# Patient Record
Sex: FEMALE | Race: WHITE | NOT HISPANIC OR LATINO | Employment: OTHER | URBAN - METROPOLITAN AREA
[De-identification: names, ages, dates, MRNs, and addresses within clinical notes are randomized per-mention and may not be internally consistent; named-entity substitution may affect disease eponyms.]

---

## 2017-01-03 ENCOUNTER — APPOINTMENT (OUTPATIENT)
Dept: PHYSICAL THERAPY | Facility: CLINIC | Age: 77
End: 2017-01-03
Payer: COMMERCIAL

## 2017-01-03 PROCEDURE — 97110 THERAPEUTIC EXERCISES: CPT

## 2017-01-03 PROCEDURE — 97140 MANUAL THERAPY 1/> REGIONS: CPT

## 2017-01-05 ENCOUNTER — APPOINTMENT (OUTPATIENT)
Dept: PHYSICAL THERAPY | Facility: CLINIC | Age: 77
End: 2017-01-05
Payer: COMMERCIAL

## 2017-01-05 PROCEDURE — 97110 THERAPEUTIC EXERCISES: CPT

## 2017-01-05 PROCEDURE — 97140 MANUAL THERAPY 1/> REGIONS: CPT

## 2017-01-09 ENCOUNTER — APPOINTMENT (OUTPATIENT)
Dept: PHYSICAL THERAPY | Facility: CLINIC | Age: 77
End: 2017-01-09
Payer: COMMERCIAL

## 2017-01-10 ENCOUNTER — APPOINTMENT (OUTPATIENT)
Dept: PHYSICAL THERAPY | Facility: CLINIC | Age: 77
End: 2017-01-10
Payer: COMMERCIAL

## 2017-01-12 ENCOUNTER — APPOINTMENT (OUTPATIENT)
Dept: PHYSICAL THERAPY | Facility: CLINIC | Age: 77
End: 2017-01-12
Payer: COMMERCIAL

## 2017-01-13 ENCOUNTER — ALLSCRIPTS OFFICE VISIT (OUTPATIENT)
Dept: OTHER | Facility: OTHER | Age: 77
End: 2017-01-13

## 2017-01-16 ENCOUNTER — APPOINTMENT (OUTPATIENT)
Dept: PHYSICAL THERAPY | Facility: CLINIC | Age: 77
End: 2017-01-16
Payer: COMMERCIAL

## 2017-01-16 PROCEDURE — 97140 MANUAL THERAPY 1/> REGIONS: CPT

## 2017-01-16 PROCEDURE — 97110 THERAPEUTIC EXERCISES: CPT

## 2017-01-17 ENCOUNTER — APPOINTMENT (OUTPATIENT)
Dept: PHYSICAL THERAPY | Facility: CLINIC | Age: 77
End: 2017-01-17
Payer: COMMERCIAL

## 2017-01-17 PROCEDURE — 97110 THERAPEUTIC EXERCISES: CPT

## 2017-01-17 PROCEDURE — 97014 ELECTRIC STIMULATION THERAPY: CPT

## 2017-01-17 PROCEDURE — G8991 OTHER PT/OT GOAL STATUS: HCPCS | Performed by: PHYSICAL THERAPIST

## 2017-01-17 PROCEDURE — G8990 OTHER PT/OT CURRENT STATUS: HCPCS | Performed by: PHYSICAL THERAPIST

## 2017-01-17 PROCEDURE — 97140 MANUAL THERAPY 1/> REGIONS: CPT

## 2017-01-19 ENCOUNTER — APPOINTMENT (OUTPATIENT)
Dept: PHYSICAL THERAPY | Facility: CLINIC | Age: 77
End: 2017-01-19
Payer: COMMERCIAL

## 2017-01-19 PROCEDURE — 97140 MANUAL THERAPY 1/> REGIONS: CPT

## 2017-01-19 PROCEDURE — 97014 ELECTRIC STIMULATION THERAPY: CPT

## 2017-01-19 PROCEDURE — 97110 THERAPEUTIC EXERCISES: CPT

## 2017-01-23 ENCOUNTER — APPOINTMENT (OUTPATIENT)
Dept: PHYSICAL THERAPY | Facility: CLINIC | Age: 77
End: 2017-01-23
Payer: COMMERCIAL

## 2017-01-23 PROCEDURE — 97014 ELECTRIC STIMULATION THERAPY: CPT

## 2017-01-23 PROCEDURE — G0283 ELEC STIM OTHER THAN WOUND: HCPCS

## 2017-01-23 PROCEDURE — 97140 MANUAL THERAPY 1/> REGIONS: CPT

## 2017-01-23 PROCEDURE — 97110 THERAPEUTIC EXERCISES: CPT

## 2017-01-24 ENCOUNTER — APPOINTMENT (OUTPATIENT)
Dept: PHYSICAL THERAPY | Facility: CLINIC | Age: 77
End: 2017-01-24
Payer: COMMERCIAL

## 2017-01-24 PROCEDURE — 97140 MANUAL THERAPY 1/> REGIONS: CPT

## 2017-01-24 PROCEDURE — 97014 ELECTRIC STIMULATION THERAPY: CPT

## 2017-01-24 PROCEDURE — G0283 ELEC STIM OTHER THAN WOUND: HCPCS

## 2017-01-24 PROCEDURE — 97110 THERAPEUTIC EXERCISES: CPT

## 2017-01-26 ENCOUNTER — GENERIC CONVERSION - ENCOUNTER (OUTPATIENT)
Dept: OTHER | Facility: OTHER | Age: 77
End: 2017-01-26

## 2017-01-26 ENCOUNTER — APPOINTMENT (OUTPATIENT)
Dept: PHYSICAL THERAPY | Facility: CLINIC | Age: 77
End: 2017-01-26
Payer: COMMERCIAL

## 2017-01-26 PROCEDURE — 97110 THERAPEUTIC EXERCISES: CPT

## 2017-01-26 PROCEDURE — 97140 MANUAL THERAPY 1/> REGIONS: CPT

## 2017-01-30 ENCOUNTER — APPOINTMENT (OUTPATIENT)
Dept: PHYSICAL THERAPY | Facility: CLINIC | Age: 77
End: 2017-01-30
Payer: COMMERCIAL

## 2017-01-30 PROCEDURE — G0283 ELEC STIM OTHER THAN WOUND: HCPCS

## 2017-01-30 PROCEDURE — 97140 MANUAL THERAPY 1/> REGIONS: CPT

## 2017-01-30 PROCEDURE — 97014 ELECTRIC STIMULATION THERAPY: CPT

## 2017-01-30 PROCEDURE — 97110 THERAPEUTIC EXERCISES: CPT

## 2017-01-31 ENCOUNTER — APPOINTMENT (OUTPATIENT)
Dept: PHYSICAL THERAPY | Facility: CLINIC | Age: 77
End: 2017-01-31
Payer: COMMERCIAL

## 2017-01-31 PROCEDURE — 97140 MANUAL THERAPY 1/> REGIONS: CPT

## 2017-01-31 PROCEDURE — 97110 THERAPEUTIC EXERCISES: CPT

## 2017-02-02 ENCOUNTER — APPOINTMENT (OUTPATIENT)
Dept: PHYSICAL THERAPY | Facility: CLINIC | Age: 77
End: 2017-02-02
Payer: COMMERCIAL

## 2017-02-03 ENCOUNTER — ALLSCRIPTS OFFICE VISIT (OUTPATIENT)
Dept: OTHER | Facility: OTHER | Age: 77
End: 2017-02-03

## 2017-02-03 ENCOUNTER — HOSPITAL ENCOUNTER (OUTPATIENT)
Dept: RADIOLOGY | Facility: CLINIC | Age: 77
Discharge: HOME | End: 2017-02-03
Payer: COMMERCIAL

## 2017-02-03 DIAGNOSIS — Z96.611 PRESENCE OF RIGHT ARTIFICIAL SHOULDER JOINT: ICD-10-CM

## 2017-02-03 PROCEDURE — 73030 X-RAY EXAM OF SHOULDER: CPT

## 2017-02-06 ENCOUNTER — APPOINTMENT (OUTPATIENT)
Dept: PHYSICAL THERAPY | Facility: CLINIC | Age: 77
End: 2017-02-06
Payer: COMMERCIAL

## 2017-02-06 PROCEDURE — 97140 MANUAL THERAPY 1/> REGIONS: CPT

## 2017-02-06 PROCEDURE — 97110 THERAPEUTIC EXERCISES: CPT

## 2017-02-07 ENCOUNTER — APPOINTMENT (OUTPATIENT)
Dept: PHYSICAL THERAPY | Facility: CLINIC | Age: 77
End: 2017-02-07
Payer: COMMERCIAL

## 2017-02-07 ENCOUNTER — GENERIC CONVERSION - ENCOUNTER (OUTPATIENT)
Dept: OTHER | Facility: OTHER | Age: 77
End: 2017-02-07

## 2017-02-09 ENCOUNTER — APPOINTMENT (OUTPATIENT)
Dept: PHYSICAL THERAPY | Facility: CLINIC | Age: 77
End: 2017-02-09
Payer: COMMERCIAL

## 2017-02-10 ENCOUNTER — APPOINTMENT (OUTPATIENT)
Dept: PHYSICAL THERAPY | Facility: CLINIC | Age: 77
End: 2017-02-10
Payer: COMMERCIAL

## 2017-02-10 PROCEDURE — 97110 THERAPEUTIC EXERCISES: CPT

## 2017-02-13 ENCOUNTER — APPOINTMENT (OUTPATIENT)
Dept: PHYSICAL THERAPY | Facility: CLINIC | Age: 77
End: 2017-02-13
Payer: COMMERCIAL

## 2017-02-13 PROCEDURE — 97110 THERAPEUTIC EXERCISES: CPT

## 2017-02-14 ENCOUNTER — APPOINTMENT (OUTPATIENT)
Dept: PHYSICAL THERAPY | Facility: CLINIC | Age: 77
End: 2017-02-14
Payer: COMMERCIAL

## 2017-02-14 PROCEDURE — G8990 OTHER PT/OT CURRENT STATUS: HCPCS | Performed by: PHYSICAL THERAPIST

## 2017-02-14 PROCEDURE — 97110 THERAPEUTIC EXERCISES: CPT

## 2017-02-14 PROCEDURE — G8991 OTHER PT/OT GOAL STATUS: HCPCS | Performed by: PHYSICAL THERAPIST

## 2017-02-16 ENCOUNTER — APPOINTMENT (OUTPATIENT)
Dept: PHYSICAL THERAPY | Facility: CLINIC | Age: 77
End: 2017-02-16
Payer: COMMERCIAL

## 2017-02-20 ENCOUNTER — APPOINTMENT (OUTPATIENT)
Dept: PHYSICAL THERAPY | Facility: CLINIC | Age: 77
End: 2017-02-20
Payer: COMMERCIAL

## 2017-02-20 PROCEDURE — 97110 THERAPEUTIC EXERCISES: CPT

## 2017-02-21 ENCOUNTER — APPOINTMENT (OUTPATIENT)
Dept: PHYSICAL THERAPY | Facility: CLINIC | Age: 77
End: 2017-02-21
Payer: COMMERCIAL

## 2017-02-21 PROCEDURE — 97110 THERAPEUTIC EXERCISES: CPT

## 2017-02-23 ENCOUNTER — APPOINTMENT (OUTPATIENT)
Dept: PHYSICAL THERAPY | Facility: CLINIC | Age: 77
End: 2017-02-23
Payer: COMMERCIAL

## 2017-02-23 PROCEDURE — 97110 THERAPEUTIC EXERCISES: CPT

## 2017-02-27 ENCOUNTER — APPOINTMENT (OUTPATIENT)
Dept: PHYSICAL THERAPY | Facility: CLINIC | Age: 77
End: 2017-02-27
Payer: COMMERCIAL

## 2017-02-27 PROCEDURE — 97110 THERAPEUTIC EXERCISES: CPT

## 2017-02-28 ENCOUNTER — APPOINTMENT (OUTPATIENT)
Dept: PHYSICAL THERAPY | Facility: CLINIC | Age: 77
End: 2017-02-28
Payer: COMMERCIAL

## 2017-02-28 PROCEDURE — 97110 THERAPEUTIC EXERCISES: CPT

## 2017-03-02 ENCOUNTER — APPOINTMENT (OUTPATIENT)
Dept: PHYSICAL THERAPY | Facility: CLINIC | Age: 77
End: 2017-03-02
Payer: COMMERCIAL

## 2017-03-02 PROCEDURE — 97110 THERAPEUTIC EXERCISES: CPT

## 2017-03-03 ENCOUNTER — HOSPITAL ENCOUNTER (OUTPATIENT)
Dept: RADIOLOGY | Facility: CLINIC | Age: 77
Discharge: HOME | End: 2017-03-03
Payer: COMMERCIAL

## 2017-03-03 ENCOUNTER — ALLSCRIPTS OFFICE VISIT (OUTPATIENT)
Dept: OTHER | Facility: OTHER | Age: 77
End: 2017-03-03

## 2017-03-03 DIAGNOSIS — Z96.611 PRESENCE OF RIGHT ARTIFICIAL SHOULDER JOINT: ICD-10-CM

## 2017-03-03 PROCEDURE — 73030 X-RAY EXAM OF SHOULDER: CPT

## 2017-03-06 ENCOUNTER — APPOINTMENT (OUTPATIENT)
Dept: PHYSICAL THERAPY | Facility: CLINIC | Age: 77
End: 2017-03-06
Payer: COMMERCIAL

## 2017-03-07 ENCOUNTER — APPOINTMENT (OUTPATIENT)
Dept: PHYSICAL THERAPY | Facility: CLINIC | Age: 77
End: 2017-03-07
Payer: COMMERCIAL

## 2017-03-07 PROCEDURE — 97110 THERAPEUTIC EXERCISES: CPT

## 2017-03-09 ENCOUNTER — APPOINTMENT (OUTPATIENT)
Dept: PHYSICAL THERAPY | Facility: CLINIC | Age: 77
End: 2017-03-09
Payer: COMMERCIAL

## 2017-03-09 PROCEDURE — 97110 THERAPEUTIC EXERCISES: CPT

## 2017-03-13 ENCOUNTER — APPOINTMENT (OUTPATIENT)
Dept: PHYSICAL THERAPY | Facility: CLINIC | Age: 77
End: 2017-03-13
Payer: COMMERCIAL

## 2017-03-13 PROCEDURE — 97014 ELECTRIC STIMULATION THERAPY: CPT

## 2017-03-13 PROCEDURE — G8991 OTHER PT/OT GOAL STATUS: HCPCS | Performed by: PHYSICAL THERAPIST

## 2017-03-13 PROCEDURE — G8990 OTHER PT/OT CURRENT STATUS: HCPCS | Performed by: PHYSICAL THERAPIST

## 2017-03-13 PROCEDURE — G0283 ELEC STIM OTHER THAN WOUND: HCPCS

## 2017-03-13 PROCEDURE — 97110 THERAPEUTIC EXERCISES: CPT

## 2017-03-14 ENCOUNTER — APPOINTMENT (OUTPATIENT)
Dept: PHYSICAL THERAPY | Facility: CLINIC | Age: 77
End: 2017-03-14
Payer: COMMERCIAL

## 2017-03-16 ENCOUNTER — APPOINTMENT (OUTPATIENT)
Dept: PHYSICAL THERAPY | Facility: CLINIC | Age: 77
End: 2017-03-16
Payer: COMMERCIAL

## 2017-03-17 ENCOUNTER — APPOINTMENT (OUTPATIENT)
Dept: PHYSICAL THERAPY | Facility: CLINIC | Age: 77
End: 2017-03-17
Payer: COMMERCIAL

## 2017-03-20 ENCOUNTER — APPOINTMENT (OUTPATIENT)
Dept: PHYSICAL THERAPY | Facility: CLINIC | Age: 77
End: 2017-03-20
Payer: COMMERCIAL

## 2017-03-20 PROCEDURE — 97110 THERAPEUTIC EXERCISES: CPT

## 2017-03-21 ENCOUNTER — APPOINTMENT (OUTPATIENT)
Dept: PHYSICAL THERAPY | Facility: CLINIC | Age: 77
End: 2017-03-21
Payer: COMMERCIAL

## 2017-03-21 PROCEDURE — 97110 THERAPEUTIC EXERCISES: CPT

## 2017-03-23 ENCOUNTER — APPOINTMENT (OUTPATIENT)
Dept: PHYSICAL THERAPY | Facility: CLINIC | Age: 77
End: 2017-03-23
Payer: COMMERCIAL

## 2017-03-23 PROCEDURE — G0283 ELEC STIM OTHER THAN WOUND: HCPCS

## 2017-03-23 PROCEDURE — 97110 THERAPEUTIC EXERCISES: CPT

## 2017-03-23 PROCEDURE — 97014 ELECTRIC STIMULATION THERAPY: CPT

## 2017-03-28 ENCOUNTER — APPOINTMENT (OUTPATIENT)
Dept: PHYSICAL THERAPY | Facility: CLINIC | Age: 77
End: 2017-03-28
Payer: COMMERCIAL

## 2017-03-30 ENCOUNTER — APPOINTMENT (OUTPATIENT)
Dept: PHYSICAL THERAPY | Facility: CLINIC | Age: 77
End: 2017-03-30
Payer: COMMERCIAL

## 2017-03-31 ENCOUNTER — APPOINTMENT (OUTPATIENT)
Dept: PHYSICAL THERAPY | Facility: CLINIC | Age: 77
End: 2017-03-31
Payer: COMMERCIAL

## 2017-07-07 ENCOUNTER — APPOINTMENT (OUTPATIENT)
Dept: RADIOLOGY | Facility: CLINIC | Age: 77
End: 2017-07-07
Payer: COMMERCIAL

## 2017-07-07 ENCOUNTER — ALLSCRIPTS OFFICE VISIT (OUTPATIENT)
Dept: OTHER | Facility: OTHER | Age: 77
End: 2017-07-07

## 2017-07-07 DIAGNOSIS — M25.519 PAIN IN SHOULDER: ICD-10-CM

## 2017-07-07 PROCEDURE — 73030 X-RAY EXAM OF SHOULDER: CPT

## 2017-07-20 ENCOUNTER — ALLSCRIPTS OFFICE VISIT (OUTPATIENT)
Dept: OTHER | Facility: OTHER | Age: 77
End: 2017-07-20

## 2017-11-02 ENCOUNTER — ALLSCRIPTS OFFICE VISIT (OUTPATIENT)
Dept: OTHER | Facility: OTHER | Age: 77
End: 2017-11-02

## 2017-11-02 ENCOUNTER — GENERIC CONVERSION - ENCOUNTER (OUTPATIENT)
Dept: OTHER | Facility: OTHER | Age: 77
End: 2017-11-02

## 2017-11-02 ENCOUNTER — LAB REQUISITION (OUTPATIENT)
Dept: LAB | Facility: HOSPITAL | Age: 77
End: 2017-11-02
Payer: COMMERCIAL

## 2017-11-02 DIAGNOSIS — F17.200 NICOTINE DEPENDENCE, UNCOMPLICATED: ICD-10-CM

## 2017-11-02 DIAGNOSIS — M19.90 OSTEOARTHRITIS: ICD-10-CM

## 2017-11-02 DIAGNOSIS — M85.80 OTHER SPECIFIED DISORDERS OF BONE DENSITY AND STRUCTURE, UNSPECIFIED SITE (CODE): ICD-10-CM

## 2017-11-02 DIAGNOSIS — I70.219 ATHEROSCLEROSIS OF NATIVE ARTERIES OF EXTREMITY WITH INTERMITTENT CLAUDICATION (HCC): ICD-10-CM

## 2017-11-02 DIAGNOSIS — E78.5 HYPERLIPIDEMIA: ICD-10-CM

## 2017-11-02 DIAGNOSIS — J44.9 CHRONIC OBSTRUCTIVE PULMONARY DISEASE (HCC): ICD-10-CM

## 2017-11-02 DIAGNOSIS — E55.9 VITAMIN D DEFICIENCY: ICD-10-CM

## 2017-11-02 DIAGNOSIS — Z00.00 ENCOUNTER FOR GENERAL ADULT MEDICAL EXAMINATION WITHOUT ABNORMAL FINDINGS: ICD-10-CM

## 2017-11-02 LAB
25(OH)D3 SERPL-MCNC: 29.1 NG/ML (ref 30–100)
ALBUMIN SERPL BCP-MCNC: 4.3 G/DL (ref 3.5–5)
ALP SERPL-CCNC: 106 U/L (ref 46–116)
ALT SERPL W P-5'-P-CCNC: 19 U/L (ref 12–78)
ANION GAP SERPL CALCULATED.3IONS-SCNC: 6 MMOL/L (ref 4–13)
AST SERPL W P-5'-P-CCNC: 14 U/L (ref 5–45)
BACTERIA UR QL AUTO: ABNORMAL /HPF
BASOPHILS # BLD AUTO: 0.02 THOUSANDS/ΜL (ref 0–0.1)
BASOPHILS NFR BLD AUTO: 0 % (ref 0–1)
BILIRUB SERPL-MCNC: 0.82 MG/DL (ref 0.2–1)
BILIRUB UR QL STRIP: NEGATIVE
BUN SERPL-MCNC: 14 MG/DL (ref 5–25)
CALCIUM SERPL-MCNC: 9.8 MG/DL (ref 8.3–10.1)
CHLORIDE SERPL-SCNC: 105 MMOL/L (ref 100–108)
CHOLEST SERPL-MCNC: 184 MG/DL (ref 50–200)
CLARITY UR: CLEAR
CO2 SERPL-SCNC: 27 MMOL/L (ref 21–32)
COLOR UR: YELLOW
CREAT SERPL-MCNC: 0.8 MG/DL (ref 0.6–1.3)
EOSINOPHIL # BLD AUTO: 0.17 THOUSAND/ΜL (ref 0–0.61)
EOSINOPHIL NFR BLD AUTO: 2 % (ref 0–6)
ERYTHROCYTE [DISTWIDTH] IN BLOOD BY AUTOMATED COUNT: 13.4 % (ref 11.6–15.1)
GFR SERPL CREATININE-BSD FRML MDRD: 71 ML/MIN/1.73SQ M
GLUCOSE P FAST SERPL-MCNC: 90 MG/DL (ref 65–99)
GLUCOSE UR STRIP-MCNC: NEGATIVE MG/DL
HCT VFR BLD AUTO: 42.6 % (ref 34.8–46.1)
HDLC SERPL-MCNC: 71 MG/DL (ref 40–60)
HGB BLD-MCNC: 14.8 G/DL (ref 11.5–15.4)
HGB UR QL STRIP.AUTO: NEGATIVE
HYALINE CASTS #/AREA URNS LPF: ABNORMAL /LPF
KETONES UR STRIP-MCNC: NEGATIVE MG/DL
LDLC SERPL CALC-MCNC: 72 MG/DL (ref 0–100)
LEUKOCYTE ESTERASE UR QL STRIP: ABNORMAL
LYMPHOCYTES # BLD AUTO: 3.35 THOUSANDS/ΜL (ref 0.6–4.47)
LYMPHOCYTES NFR BLD AUTO: 43 % (ref 14–44)
MCH RBC QN AUTO: 35.4 PG (ref 26.8–34.3)
MCHC RBC AUTO-ENTMCNC: 34.7 G/DL (ref 31.4–37.4)
MCV RBC AUTO: 102 FL (ref 82–98)
MONOCYTES # BLD AUTO: 0.71 THOUSAND/ΜL (ref 0.17–1.22)
MONOCYTES NFR BLD AUTO: 9 % (ref 4–12)
NEUTROPHILS # BLD AUTO: 3.55 THOUSANDS/ΜL (ref 1.85–7.62)
NEUTS SEG NFR BLD AUTO: 46 % (ref 43–75)
NITRITE UR QL STRIP: NEGATIVE
NON-SQ EPI CELLS URNS QL MICRO: ABNORMAL /HPF
NRBC BLD AUTO-RTO: 0 /100 WBCS
PH UR STRIP.AUTO: 7 [PH] (ref 4.5–8)
PLATELET # BLD AUTO: 252 THOUSANDS/UL (ref 149–390)
PMV BLD AUTO: 10.4 FL (ref 8.9–12.7)
POTASSIUM SERPL-SCNC: 4.6 MMOL/L (ref 3.5–5.3)
PROT SERPL-MCNC: 7.6 G/DL (ref 6.4–8.2)
PROT UR STRIP-MCNC: ABNORMAL MG/DL
RBC # BLD AUTO: 4.18 MILLION/UL (ref 3.81–5.12)
RBC #/AREA URNS AUTO: ABNORMAL /HPF
SODIUM SERPL-SCNC: 138 MMOL/L (ref 136–145)
SP GR UR STRIP.AUTO: 1.02 (ref 1–1.03)
TRIGL SERPL-MCNC: 205 MG/DL
TSH SERPL DL<=0.05 MIU/L-ACNC: 1.11 UIU/ML (ref 0.36–3.74)
UROBILINOGEN UR QL STRIP.AUTO: 0.2 E.U./DL
WBC # BLD AUTO: 7.82 THOUSAND/UL (ref 4.31–10.16)
WBC #/AREA URNS AUTO: ABNORMAL /HPF

## 2017-11-02 PROCEDURE — 84443 ASSAY THYROID STIM HORMONE: CPT | Performed by: FAMILY MEDICINE

## 2017-11-02 PROCEDURE — 80053 COMPREHEN METABOLIC PANEL: CPT | Performed by: FAMILY MEDICINE

## 2017-11-02 PROCEDURE — 81001 URINALYSIS AUTO W/SCOPE: CPT | Performed by: FAMILY MEDICINE

## 2017-11-02 PROCEDURE — 82306 VITAMIN D 25 HYDROXY: CPT | Performed by: FAMILY MEDICINE

## 2017-11-02 PROCEDURE — 80061 LIPID PANEL: CPT | Performed by: FAMILY MEDICINE

## 2017-11-02 PROCEDURE — 85025 COMPLETE CBC W/AUTO DIFF WBC: CPT | Performed by: FAMILY MEDICINE

## 2017-11-03 NOTE — PROGRESS NOTES
Assessment  1  Chronic obstructive pulmonary disease (496) (J44 9)   2  Osteoarthritis (715 90) (M19 90)   3  Hyperlipidemia (272 4) (E78 5)   4  Vitamin D deficiency (268 9) (E55 9)   5  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)   (I70 219)   6  Current every day smoker (305 1) (F17 200)   7  DDD (degenerative disc disease) (722 6)   8  Osteopenia (733 90) (M85 80)    Plan  Atherosclerosis of native artery of extremity with intermittent claudication, Chronic  obstructive pulmonary disease, SocHx: Current every day smoker, DDD (degenerative  disc disease), Health Maintenance, Hyperlipidemia, Osteoarthritis, Osteopenia, Vitamin  D deficiency    · (1) CBC/PLT/DIFF; Status:Hold For - Exact Date; Requested for: In Office Collection;    · (1) COMPREHENSIVE METABOLIC PANEL; Status:Hold For - Exact Date; Requested  for: In Office Collection;    · (1) LIPID PANEL, FASTING; Status:Hold For - Exact Date; Requested for: In Office  Collection;    · (1) TSH WITH FT4 REFLEX; Status:Hold For - Exact Date; Requested for: In Office  Collection;    · (1) URINALYSIS w URINE C/S REFLEX (will reflex a microscopy if leukocytes, occult  blood, or nitrites are not within normal limits); Status:Hold For - Exact Date; Requested  for: In Office Collection;    · (1) VITAMIN D 25-HYDROXY; Status:Hold For - Exact Date; Requested for: In [employer]; Health Maintenance    · *VB - Fall Risk Assessment  (Dx Z13 89 Screen for Neurologic Disorder); Status:Active; Requested HIO:10TXS3085;    · *VB - Urinary Incontinence Screen (Dx Z13 89 Screen for UI); Status:Active; Requested  for:06Nvu2172;    · Fluzone High-Dose 0 5 ML Intramuscular Suspension Prefilled Syringe;  INJECT 0 5  ML Intramuscular;  To Be Done: 68BYC3837  Hyperlipidemia    · Simvastatin 40 MG Oral Tablet; TAKE ONE TABLET BY MOUTH EVERY DAY   · A diet that is low in fat, cholesterol, and sodium is considered a cardiac diet ;  Status:Complete;   Done: 71DCD3154   · Begin a limited exercise program ; Status:Complete;   Done: 76UDF6927   · Begin or continue regular aerobic exercise  Gradually work up to at least 3 sessions of  30 minutes of exercise a week ; Status:Complete;   Done: 13XJR5318   · Continue with our present treatment plan ; Status:Complete;   Done: 71YMU4074   · Eat a low fat and low cholesterol diet ; Status:Complete;   Done: 06CYN2668   · Eat no more than 30 grams of fat per day ; Status:Complete;   Done: 52WXR2556   · We recommend you modify your diet to achieve and maintain a healthy weight  Being  overweight may increase your risk for developing health problems such as diabetes,  heart disease, and cancer  Avoid high fat foods and eat a balanced diet rich  in fruits and vegetables  The combination of a reduced-calorie diet and increased  physical activity is recommended  Please let us know if you would like to  learn more about your nutrition and calorie needs, and additional options including  weight loss programs that can help you achieve your goals ; Status:Complete;   Done:  86DKR9195  SocHx: Current every day smoker    · Decreasing the stress in your life may help your condition improve ; Status:Complete;    Done: 86KPG8455   · You need to quit smoking ; Status:Complete;   Done: 80EYR5373    Discussion/Summary    Patient to continue present treatment and agrees to restart simvastatin  Discussed medical treatment for elevated blood pressure if it continues  Patient instructed to follow a low-fat and a low-salt diet and get regular exercise walking as tolerated  Discussed use of inhalers, the patient declines at this time  Patient received high-dose flu vaccine today  Fasting labs drawn as above  Recommend patient discontinue smoking  Patient to return the office in 4 months  Possible side effects of new medications were reviewed with the patient/guardian today   The treatment plan was reviewed with the patient/guardian  The patient/guardian understands and agrees with the treatment plan      Chief Complaint  Karen Castano not taken her simvastatin since March   Patient is here today for follow up of chronic conditions described in HPI  History of Present Illness  Patient is here for routine appointment for chronic conditions and fasting labs  Patient has been feeling fairly well overall other complains of continued right shoulder pain status post humerus fracture and surgery last November  Patient has returned to work 2 days a week  No regular exercise program although patient remains fairly active throughout the day  Patient discontinued simvastatin several months ago secondary to the cost  Patient continues to smoke 4 cigarettes per day  The patient states she has been stable with her COPD control since the last visit  She has no significant interval events  Symptoms: denies dyspnea on exertion,-- denies exercise intolerance,-- stable coughing,-- stable coughing up sputum,-- stable wheezing-- and-- denies lower extremity edema  Associated Symptoms: no fever  rescue inhaler use  Medications: The patient is not currently on any medications for her COPD  Disease Management: the patient is doing well with her COPD goals  The patient states her hyperlipidemia has been under good control since the last visit  Comorbid Illnesses: peripheral vascular disease-- and-- tobacco use  She has no significant interval events  Symptoms: denies chest pain,-- stable intermittent leg claudication,-- denies muscle pain-- and-- denies muscle weakness  Associated symptoms include no memory loss  Medications: The patient is not currently on any medications for her hyperlipidemia  -- the patient is not adherent with her medication regimen  -- She denies medication side effects  The patient is doing well with her hyperlipidemia goals  the patient's last LDL was 71 mg/dL   The patient is due for a lipid panel-- and-- liver function tests  The patient is being seen for follow-up of vitamin D deficiency  Disease type: vitamin D deficiency  Current treatment includes dietary vitamin D, dietary calcium, weight bearing exercise and vitamin D3 (cholecalciferol)  Symptoms:  no fatigue,-- no bone pain,-- no muscle pain,-- no muscle weakness,-- no muscle cramps,-- no paresthesias-- and-- no gait abnormality  The patient is currently experiencing symptoms  The patient states her osteoarthritis has been stable since the last visit  The patient's osteoarthritis has not resulted in physical disability  She has no significant interval events  Symptoms: denies knee pain,-- denies finger pain,-- denies neck pain-- and-- stable back pain--    The patient presents with complaints of right hip stable hip pain  The patient presents with complaints of right shoulder stable shoulder pain  Activities: able to do housework with limitations-- and-- able to work with limitations, but-- able to do activities of daily living without limitations  Medications: The patient is not currently on any medications for her osteoarthritis  Disease Management: the patient is doing well with her osteoarthritis goals  Review of Systems    Constitutional: no fever,-- not feeling poorly,-- no chills-- and-- not feeling tired  Eyes: No complaints of eye pain, no red eyes, no eyesight problems, no discharge, no dry eyes, no itching of eyes  ENT: no complaints of earache, no loss of hearing, no nose bleeds, no nasal discharge, no sore throat, no hoarseness  Gastrointestinal: No complaints of abdominal pain, no constipation, no nausea or vomiting, no diarrhea, no bloody stools  Genitourinary: no dysuria-- and-- no incontinence  Hematologic/Lymphatic: No complaints of swollen glands, no swollen glands in the neck, does not bleed easily, does not bruise easily       Preventive Quality 65 and Older: Falls Risk: The patient fell 1 times in the past 12 months  --    tripped  Symptoms Include: no confusion, no lightheadedness, no vertigo, no dizziness, no syncope, no impaired balance, no visual problems, no leg weakness, no recurring falls and no recent fall  The patient is currently experiencing fall symptoms  Associated symptoms:  no impaired mobility-- and-- no impaired ability to live independently  The patient is currently experiencing urinary symptoms  Urinary Incontinence Symptoms includes: no urinary incontinence, no incomplete bladder emptying, no urinary frequency, no urinary urgency, no urinary hesitancy, no dysuria, no nocturia, no straining, no weak stream, no intermittent stream and no post-void dribbling       Active Problems  1  Asymptomatic menopausal state (V49 81) (Z78 0)   2  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)   (I70 219)   3  Chronic obstructive pulmonary disease (496) (J44 9)   4  Colon cancer screening (V76 51) (Z12 11)   5  Current every day smoker (305 1) (F17 200)   6  DDD (degenerative disc disease) (722 6)   7  Dermatitis (692 9) (L30 9)   8  Edema (782 3) (R60 9)   9  Humerus fracture (812 20) (S42 309A)   10  Hyperlipidemia (272 4) (E78 5)   11  Iliac artery stenosis, bilateral (447 1) (I77 1)   12  Occlusion and stenosis of carotid artery (433 10) (I65 29)   13  Open wound of groin (879 4) (S31 109A)   14  Osteoarthritis (715 90) (M19 90)   15  Osteopenia (733 90) (M85 80)   16  Postoperative seroma, subsequent encounter   17  Postoperative state (V45 89) (Z98 890)   18  Presence of artificial shoulder joint, right (V43 61) (Z96 611)   19  Right shoulder pain (719 41) (M25 511)   20  Shoulder pain (719 41) (M25 519)   21  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)   22  Vitamin D deficiency (268 9) (E55 9)    Past Medical History  1  History of Acute bronchitis (466 0) (J20 9)   2  History of appendicitis (V12 79) (Z87 19)    Surgical History  1   History of Appendectomy   2  History of Endarterectomy   3  History of Hemorrhoidectomy   4  History of Incisional Breast Biopsy   5  History of PTA Femoral-Popliteal Right   6  History of PTA Iliac Initial Stenosis With Stent   7  History of Tonsillectomy   8  History of Wound Care Debridement Non-Selective    Family History  Mother    1  Family history of Acute Myocardial Infarction (V17 3)  Father    2  Family history of Liver Cancer  Sister    3  Family history of Kidney Cancer (V16 51)   4  Family history of Kidney Cancer (V16 51)   5  Family history of Lung Cancer (V16 1)   6  Family history of Lung Cancer (V16 1)    Social History   · Alcohol Use (History)   · Cigarette smoker (305 1) (F17 210)   · Current every day smoker (305 1) (F17 200)   · Current some day smoker (305 1) (F17 200)   · Currently attempting to quit smoking (305 1) (Z72 0)   · Daily Coffee Consumption (2  Cups/Day)    Current Meds   1  Aleve CAPS Recorded   2  Aspirin 81 MG TABS; Therapy: (Carmen Munroe) to Recorded   3  Calcium 600 + D TABS; Therapy: (Recorded:99Gdi4536) to Recorded   4  Lidocaine HCl - 4 % External Solution; APPLY 2  [facility] External; To Be Done: 50VDT3031;   Status: HOLD FOR - Administration Ordered   5  Simvastatin 40 MG Oral Tablet; TAKE ONE TABLET BY MOUTH EVERY DAY; Therapy: 39GYV1351 to (Rosa Almodovar)  Requested for: 74YXE8044; Last   SR:10UIG1527 Ordered    Allergies  1  CeleBREX CAPS  2  Adhesive Tape   3  Chocolate   4  Other    Vitals  Vital Signs    Recorded: 37QFY7389 08:29AM Recorded: 67RMZ4693 08:00AM   Temperature  98 7 F   Heart Rate 72    Respiration 16    Systolic 294    Diastolic 82    Height  5 ft 1 in   Weight  146 lb    BMI Calculated  27 59   BSA Calculated  1 65     Physical Exam    Constitutional   General appearance: No acute distress, well appearing and well nourished  Eyes   Conjunctiva and lids: No swelling, erythema or discharge      Ears, Nose, Mouth, and Throat   External inspection of ears and nose: Normal     Otoscopic examination: Tympanic membranes translucent with normal light reflex  Canals patent without erythema  Nasal mucosa, septum, and turbinates: Normal without edema or erythema  Oropharynx: Normal with no erythema, edema, exudate or lesions  Pulmonary   Respiratory effort: No increased work of breathing or signs of respiratory distress  Auscultation of lungs: Abnormal   Auscultation of the lungs revealed decreased breath sounds diffusely-- and-- expiratory wheezing  Cardiovascular   Auscultation of heart: Normal rate and rhythm, normal S1 and S2, without murmurs  Examination of extremities for edema and/or varicosities: Normal     Carotid pulses: Normal     Abdomen   Abdomen: Non-tender, no masses  Lymphatic   Palpation of lymph nodes in neck: No lymphadenopathy  Musculoskeletal   Gait and station: Normal     Inspection/palpation of joints, bones, and muscles: Abnormal     Skin   Skin and subcutaneous tissue: Normal without rashes or lesions  Psychiatric   Orientation to person, place, and time: Normal     Mood and affect: Normal          Health Management  Colon cancer screening   COLONOSCOPY; every 10 years; Next Due: S738579; Overdue  Hyperlipidemia   (1) HEPATIC FUNCTION PANEL; every 6 months; Last 01MXT0120; Next Due: 14Uwk3468; Overdue  (1) LIPID PANEL, FASTING; every 1 year; Last 22VQW7003; Next Due: 09Phc6802; Overdue  Health Maintenance   Medicare Annual Wellness Visit; every 1 year; Next Due: 87PBN3074; Overdue    Future Appointments    Date/Time Provider Specialty Site   12/08/2017 08:30 AM BERNARDA Brar   Orthopedic Surgery [facility]     Signatures   Electronically signed by : Josh Castillo DO; Nov 2 2017  8:41AM EST                       (Author)

## 2017-12-08 ENCOUNTER — ALLSCRIPTS OFFICE VISIT (OUTPATIENT)
Dept: OTHER | Facility: OTHER | Age: 77
End: 2017-12-08

## 2017-12-09 NOTE — PROGRESS NOTES
Assessment    1  Humerus fracture (812 20) (S44 309A)    Plan  Right shoulder pain    · Continue with our present treatment plan ; Status:Complete;   Done: 45SDS8359  Shoulder pain    · Follow-up visit in 6 months Evaluation and Treatment  Follow-up  Status: Hold For -Scheduling  Requested for: 84UYB4771    Discussion/Summary    69 y/o female presents with now 1 year s/p R reverse total shoulder arthroplasty for fracture  activity as toleratedas needed-follow up in 6 months with new shoulder Xray, trauma series  Chief Complaint    1  Shoulder Problem    Post-Op  HPI: 69 y/o female with s/p R reverse total shoulder arthroplasty for fracture (DOS: 12/8/2016)  She states there is mild improvement in pain since her last visit  She has been doing exercises by herself at home  She has been lifting 25-30lbs  Denies any new problems except some pain to the deltoid area  She takes aleve at night for pain  She states her right middle finger pain is gone with her home exercise regimen  patient's medical history, surgical history, social history, family history, medications, allergies, and review of systems were reviewed and updated today  of Systems:No fever or chills, feels well, no tiredness, no recent weight gain or loss  No complaints of eyesight problems, no red eyesNo loss of hearing, no nosebleeds, no sore throatNo chest pain, palpitations, leg claudication, or lower extremity edema  No shortness of breath, wheezing, or coughNo abdominal pain, constipation, nausea/vomiting, no diarrhea  No dysuria or incontinence  As noted in HPI  No rash or skin lesions, no itching or dry skin, no wounds  No headache, confusion, numbness or tingling, or dizziness  No muscle weakness, frequent urination, or excessive thirst No suicidal thoughts, anziety, or depression  Review of Systems   Constitutional: No fever, no chills, feels well, no tiredness, no recent weight gain or loss    Eyes: No complaints of eyesight problems, no red eyes   ENT: no loss of hearing, no nosebleeds, no sore throat  Cardiovascular: No complaints of chest pain, no palpitations, no leg claudication or lower extremity edema  Respiratory: no compliants of shortness of breath, no wheezing, no cough  Gastrointestinal: no complaints of abdominal pain, no constipation, no nausea or diarrhea, no vomiting, no bloody stools  Genitourinary: no complaints of dysuria, no incontinence  Musculoskeletal: as noted in HPI  Integumentary: no complaints of skin rash or lesion, no itching or dry skin, no skin wounds  Neurological: no complaints of headache, no confusion, no numbness or tingling, no dizziness  Endocrine: No complaints of muscle weakness, no feelings of weakness, no frequent urination, no excessive thirst   Psychiatric: No suicidal thoughts, no anxiety, no feelings of depression  ROS reviewed  Active Problems    1  Asymptomatic menopausal state (V49 81) (Z78 0)  2  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) (I70 219)  3  Chronic obstructive pulmonary disease (496) (J44 9)  4  Colon cancer screening (V76 51) (Z12 11)  5  Current every day smoker (305 1) (F17 200)  6  DDD (degenerative disc disease) (722 6)  7  Dermatitis (692 9) (L30 9)  8  Edema (782 3) (R60 9)  9  Humerus fracture (812 20) (S42 309A)  10  Hyperlipidemia (272 4) (E78 5)  11  Iliac artery stenosis, bilateral (447 1) (I77 1)  12  Occlusion and stenosis of carotid artery (433 10) (I65 29)  13  Open wound of groin (879 4) (S31 109A)  14  Osteoarthritis (715 90) (M19 90)  15  Osteopenia (733 90) (M85 80)  16  Postoperative seroma, subsequent encounter  17  Postoperative state (V45 89) (Z98 890)  18  Presence of artificial shoulder joint, right (V43 61) (Z96 611)  19  Right shoulder pain (719 41) (M25 511)  20  Shoulder pain (719 41) (M25 519)  21  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)  22   Vitamin D deficiency (268 9) (E55 9)    Social History     · Alcohol Use (History)   · Cigarette smoker (305 1) (F17 210)   · 1/2-1 PPD X50 PLUS YRS   · Current every day smoker (305 1) (F17 200)   · Current some day smoker (305 1) (F17 200)   · Currently attempting to quit smoking (305 1) (Z72 0)   · Daily Coffee Consumption (2  Cups/Day)  The social history was reviewed and updated today  The social history was reviewed and is unchanged  Current Meds  1  Aleve CAPS Recorded  2  Aspirin 81 MG TABS; Therapy: (Aloma Feathers) to Recorded  3  Calcium 600 + D TABS; Therapy: (Recorded:20Nov2014) to Recorded  4  Lidocaine HCl - 4 % External Solution; APPLY 2  [facility] External; To Be Done: 93CMF3929; Status: HOLD FOR - Administration Ordered  5  Simvastatin 40 MG Oral Tablet; TAKE ONE TABLET BY MOUTH EVERY DAY; Therapy: 91NOC4336 to (320 [id number])  Requested for: 37TDM5708; Last Rx:02Nov2017 Ordered    The medication list was reviewed and updated today  Allergies  1  CeleBREX CAPS  2  Adhesive Tape  3  Chocolate  4  Other    Vitals   Recorded: 79FCW1781 08:50AM   Heart Rate 67   Systolic 695   Diastolic 77   Weight 149 lb        Physical Exam    Right Shoulder: Appearance: Normal except  (Elevation 170  Internal rotation L4  External rotation 50    Cuff strength is 4+/5 elevation, 4-/5 external rotation, 4-/5 belly press)   Constitutional - General appearance: Normal   Musculoskeletal - Gait and station: Normal -- Digits and nails: Normal -- Muscle strength/tone: Normal   Cardiovascular - Pulses: Normal -- Examination of extremities for edema and/or varicosities: Normal   Skin - Skin and subcutaneous tissue: Normal   Neurologic - Sensation: Normal   Psychiatric - Orientation to person, place, and time: Normal -- Mood and affect: Normal   Eyes  Conjunctiva and lids: Normal    Pupils and irises: Normal        Attending Note  Attending Note [facility]: Attending Note:1  I interviewed, took the history and examined the patient1 ,-- I discussed the case with the Resident and reviewed the Resident's note1 -- and-- I agree with the Resident management plan as it was presented to [facility]   I agree with the Resident's note1        1 Amended By: Makeda Plasencia; Dec 08 2017 4:09 PM EST    Future Appointments    Date/Time Provider Specialty Site   02/02/2018 10:[place] [address]   06/08/2018 09:30 AM BERNARDA Fu   Orthopedic Surgery [facility]       Signatures   Electronically signed by : Declan Galvan DPM; Dec  8 2017  9:27AM EST                       (Author)    Electronically signed by : BERNARDA Silva ; Dec  8 2017  4:09PM EST                       (Author)

## 2018-01-09 NOTE — MISCELLANEOUS
Message  Return to work or school:   Denis Pickering is under my professional care  She was seen in my office on 2/3/17     She is able to work with limitations (no lifting greater than one lbs with the right arm  no use of the arm above shoulder height begining 2/19/17)  Phan Garcia PA-C        Signatures   Electronically signed by : Geni Guzman, [facility]; Feb 7 2017  9:02AM EST                       (Author)    Electronically signed by : BERNARDA Guardado ; Feb 7 2017  3:43PM EST                       (Author)

## 2018-01-12 VITALS — SYSTOLIC BLOOD PRESSURE: 146 MMHG | WEIGHT: 147 LBS | DIASTOLIC BLOOD PRESSURE: 64 MMHG | BODY MASS INDEX: 27.78 KG/M2

## 2018-01-12 NOTE — PROGRESS NOTES
Assessment    1  Chronic obstructive pulmonary disease (496) (J44 9)   2  Hyperlipidemia (272 4) (E78 5)   3  Osteoarthritis (715 90) (M19 90)   4  Vitamin D deficiency (268 9) (E55 9)   5  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)   (I70 219)   6  DDD (degenerative disc disease) (722 6)   7  Current every day smoker (305 1) (F17 200)   8  Osteopenia (733 90) (M85 80)    Plan  Health Maintenance    · Fluzone High-Dose 0 5 ML Intramuscular Suspension Prefilled Syringe;  INJECT 0 5  ML Intramuscular; To Be Done: 93GYZ1898  Hyperlipidemia    · A diet that is low in fat, cholesterol, and sodium is considered a cardiac diet ;  Status:Complete;   Done: 63QSP8758   · Begin a limited exercise program ; Status:Complete;   Done: 64OAZ7062   · Begin or continue regular aerobic exercise  Gradually work up to at least 3 sessions of  30 minutes of exercise a week ; Status:Complete;   Done: 72QEL1630   · Continue with our present treatment plan ; Status:Complete;   Done: 15SLS7079   · Eat a low fat and low cholesterol diet ; Status:Complete;   Done: 63HVS4402   · Eat no more than 30 grams of fat per day ; Status:Complete;   Done: 65JVA9589   · We recommend you modify your diet to achieve and maintain a healthy weight  Being  overweight may increase your risk for developing health problems such as diabetes,  heart disease, and cancer  Avoid high fat foods and eat a balanced diet rich  in fruits and vegetables  The combination of a reduced-calorie diet and increased  physical activity is recommended    Please let us know if you would like to  learn more about your nutrition and calorie needs, and additional options including  weight loss programs that can help you achieve your goals ; Status:Complete;   Done:  43PYW1112  SocHx: Current every day smoker    · Decreasing the stress in your life may help your condition improve ; Status:Complete;    Done: 86GCQ0219   · You need to quit smoking ; Status:Complete;   Done: 00CDN7267    Discussion/Summary    Patient received high-dose flu vaccine today  Patient to continue present treatment  Patient instructed to follow low-fat low-salt diet and to get regular exercise walking as tolerated  Recommend patient discontinue smoking completely  Patient to return to the office in 3-4 months for an appointment and fasting labs  Possible side effects of new medications were reviewed with the patient/guardian today  The treatment plan was reviewed with the patient/guardian  The patient/guardian understands and agrees with the treatment plan      Chief Complaint  Non Fasting  [facility]   Patient is here today for follow up of chronic conditions described in HPI  History of Present Illness  Patient is here for routine appointment for chronic conditions and she is not fasting today  Patient saw Dr Sorto, vascular surgeon yesterday and was started on Pletal  Patient has been feeling well overall  He continues to work full-time and remains physically active at work as well as housework and yardwork  Patient continues to smoke  The patient states she has been stable with her COPD control since the last visit  Symptoms: stable dyspnea on exertion, stable exercise intolerance, stable coughing, stable coughing up sputum, stable wheezing and denies lower extremity edema  Associated Symptoms: no fever  Medications: The patient is not currently on any medications for her COPD  Disease Management: the patient is doing well with her COPD goals  The patient states her hyperlipidemia has been under good control since the last visit  Comorbid Illnesses: peripheral vascular disease and tobacco use  She has no significant interval events  Symptoms: denies chest pain, stable intermittent leg claudication, denies muscle pain and denies muscle weakness  Associated symptoms include no memory loss     Medications: the patient is adherent with her medication regimen  She denies medication side effects  The patient is doing well with her hyperlipidemia goals  the patient's last LDL was 71 mg/dL  The patient is being seen for follow-up of vitamin D deficiency  Disease type: vitamin D deficiency  Current treatment includes dietary vitamin D, dietary calcium and vitamin D3 (cholecalciferol)  Symptoms:  no fatigue, no bone pain, no muscle pain, no muscle weakness, no muscle cramps, no muscle twitching, no paresthesias and no gait abnormality  The patient is currently experiencing symptoms  The patient states her osteoarthritis has been stable since the last visit  The patient's osteoarthritis has not resulted in physical disability  She has no significant interval events  Symptoms: denies knee pain, denies finger pain, denies shoulder pain, denies neck pain and stable back pain    The patient presents with complaints of right hip stable hip pain  Associated symptoms include no localized joint swelling and no localized joint stiffness  Activities: able to do activities of daily living without limitations, able to do housework without limitations and able to work without limitations  Medications: The patient is not currently on any medications for her osteoarthritis  Disease Management: the patient is doing well with her osteoarthritis goals  Review of Systems    Constitutional: no fever, not feeling poorly, no chills and not feeling tired  Eyes: No complaints of eye pain, no red eyes, no eyesight problems, no discharge, no dry eyes, no itching of eyes  ENT: no complaints of earache, no loss of hearing, no nose bleeds, no nasal discharge, no sore throat, no hoarseness  Gastrointestinal: No complaints of abdominal pain, no constipation, no nausea or vomiting, no diarrhea, no bloody stools  Genitourinary: no dysuria and no incontinence     Hematologic/Lymphatic: No complaints of swollen glands, no swollen glands in the neck, does not bleed easily, does not bruise easily  Active Problems    1  Asymptomatic menopausal state (V49 81) (Z78 0)   2  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)   (I70 219)   3  Chronic obstructive pulmonary disease (496) (J44 9)   4  Colon cancer screening (V76 51) (Z12 11)   5  Current every day smoker (305 1) (F17 200)   6  DDD (degenerative disc disease) (722 6)   7  Dermatitis (692 9) (L30 9)   8  Edema (782 3) (R60 9)   9  Hyperlipidemia (272 4) (E78 5)   10  Iliac artery stenosis, bilateral (447 1) (I77 1)   11  Non-healing surgical wound (998 83) (T81 89XA)   12  Occlusion and stenosis of carotid artery (433 10) (I65 29)   13  Open wound of groin (879 4) (S31 109A)   14  Osteoarthritis (715 90) (M19 90)   15  Osteopenia (733 90) (M85 80)   16  Postoperative seroma, subsequent encounter   17  Postoperative state (V45 89) (Z98 89)   18  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)   19  Vitamin D deficiency (268 9) (E55 9)    Past Medical History    1  History of Acute bronchitis (466 0) (J20 9)   2  History of appendicitis (V12 79) (Z87 19)    Surgical History    1  History of Appendectomy   2  History of Endarterectomy   3  History of Hemorrhoidectomy   4  History of Incisional Breast Biopsy   5  History of PTA Femoral-Popliteal Right   6  History of PTA Iliac Initial Stenosis With Stent   7  History of Tonsillectomy   8  History of Wound Care Debridement Non-Selective    Family History  Mother    1  Family history of Acute Myocardial Infarction (V17 3)  Father    2  Family history of Liver Cancer  Sister    3  Family history of Kidney Cancer (V16 51)   4  Family history of Kidney Cancer (V16 51)   5  Family history of Lung Cancer (V16 1)   6   Family history of Lung Cancer (V16 1)    Social History    · Alcohol Use (History)   · Cigarette smoker (305 1) (F17 210)   · Current every day smoker (305 1) (F17 200)   · Current some day smoker (305 1) (F17 210)   · Currently attempting to quit smoking (305 1) (Z72 0)   · Daily Coffee Consumption (2  Cups/Day)    Current Meds   1  Aspirin 81 MG TABS; Therapy: (Ninfa Jasso) to Recorded   2  Calcium 600 + D TABS; Therapy: (Recorded:20Nov2014) to Recorded   3  Cilostazol 100 MG Oral Tablet; TAKE 1 TABLET TWICE DAILY; Therapy: 02UAE9854 to (Last Rx:59Nlb1115)  Requested for: 68Xlj2700 Ordered   4  [employer] Vitamin D CAPS; Therapy: (Rose Becker) to Recorded   5  Lidocaine HCl - 4 % External Solution; APPLY 2  [facility] External; To Be Done: 12YLD3572;   Status: HOLD FOR - Administration Ordered   6  Simvastatin 40 MG Oral Tablet; TAKE ONE TABLET BY MOUTH EVERY DAY; Therapy: 78MCU7827 to (Alexa Martin)  Requested for: 50IIT3552; Last   TR:57GXW6599 Ordered   7  Vitamin C TABS; Therapy: (Recorded:18Alh2426) to Recorded    Allergies    1  CeleBREX CAPS    2  Adhesive Tape   3  Chocolate   4  Other    Vitals  Vital Signs    Recorded: 47IHS4161 01:24PM Recorded: 85QHO0371 89:38OJ   Systolic 528    Diastolic 74    Heart Rate 80    Respiration 16    Temperature  98 1 F   Height  5 ft 1 in   Weight  156 lb    BMI Calculated  29 48   BSA Calculated  1 7     Physical Exam    Constitutional   General appearance: No acute distress, well appearing and well nourished  Eyes   Conjunctiva and lids: No swelling, erythema or discharge  Ears, Nose, Mouth, and Throat   External inspection of ears and nose: Normal     Otoscopic examination: Tympanic membranes translucent with normal light reflex  Canals patent without erythema  Nasal mucosa, septum, and turbinates: Normal without edema or erythema  Oropharynx: Normal with no erythema, edema, exudate or lesions  Pulmonary   Respiratory effort: No increased work of breathing or signs of respiratory distress  Auscultation of lungs: Abnormal   Auscultation of the lungs revealed decreased breath sounds diffusely, but no expiratory wheezing and no inspiratory wheezing  Cardiovascular   Auscultation of heart: Normal rate and rhythm, normal S1 and S2, without murmurs  Examination of extremities for edema and/or varicosities: Normal     Carotid pulses: Normal     Abdomen   Abdomen: Non-tender, no masses  Lymphatic   Palpation of lymph nodes in neck: No lymphadenopathy  Musculoskeletal   Gait and station: Normal     Inspection/palpation of joints, bones, and muscles: Normal     Skin   Skin and subcutaneous tissue: Normal without rashes or lesions  Psychiatric   Orientation to person, place, and time: Normal     Mood and affect: Normal          Health Management  Colon cancer screening   COLONOSCOPY; every 10 years; Next Due: [id number]; Overdue  Hyperlipidemia   (1) HEPATIC FUNCTION PANEL; every 6 months; Last 98UCW9249; Next Due: 08Lww0677; Active  (1) LIPID PANEL, FASTING; every 1 year; Last 76SJY2614; Next Due: 58Yqc5450; Active  Health Maintenance   Medicare Annual Wellness Visit; every 1 year; Next Due: 54XJN4113;  Overdue    Future Appointments    Date/Time Provider Specialty Site   01/05/2017 09:00 AM Ovidio Shelley MD Vascular [facility] [place]     Signatures   Electronically signed by : Mary Baez DO; Sep  9 2016  1:33PM EST                       (Author)

## 2018-01-13 VITALS
WEIGHT: 148 LBS | HEIGHT: 61 IN | BODY MASS INDEX: 27.94 KG/M2 | HEART RATE: 71 BPM | DIASTOLIC BLOOD PRESSURE: 78 MMHG | SYSTOLIC BLOOD PRESSURE: 164 MMHG

## 2018-01-13 VITALS
SYSTOLIC BLOOD PRESSURE: 153 MMHG | BODY MASS INDEX: 27.45 KG/M2 | HEART RATE: 69 BPM | HEIGHT: 61 IN | WEIGHT: 145.38 LBS | DIASTOLIC BLOOD PRESSURE: 78 MMHG

## 2018-01-13 NOTE — MISCELLANEOUS
History of Present Illness  TCM Communication [facility] [place] [facility]: She was hospitalized at [facility]  The date of discharge:, 12/9/16  Diagnosis: Humerus Fracture  She was discharged to home  She did not schedule a follow up appointment  Follow-up appointments with other specialists: will f/u with Dr Savannah Welsh  Counseling was provided to the patient  Topics counseled included importance of compliance with treatment  Communication performed and completed by Emmalene Krabbe      Active Problems    1  Asymptomatic menopausal state (V49 81) (Z78 0)   2  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)   (I70 219)   3  Chronic obstructive pulmonary disease (496) (J44 9)   4  Colon cancer screening (V76 51) (Z12 11)   5  Current every day smoker (305 1) (F17 200)   6  DDD (degenerative disc disease) (722 6)   7  Dermatitis (692 9) (L30 9)   8  Edema (782 3) (R60 9)   9  Humerus fracture (812 20) (S42 309A)   10  Hyperlipidemia (272 4) (E78 5)   11  Iliac artery stenosis, bilateral (447 1) (I77 1)   12  Non-healing surgical wound (998 83) (T81 89XA)   13  Occlusion and stenosis of carotid artery (433 10) (I65 29)   14  Open wound of groin (879 4) (S31 109A)   15  Osteoarthritis (715 90) (M19 90)   16  Osteopenia (733 90) (M85 80)   17  Postoperative seroma, subsequent encounter   18  Postoperative state (V45 89) (Z98 890)   19  Presence of artificial shoulder joint, right (V43 61) (Z96 611)   20  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)   21  Vitamin D deficiency (268 9) (E55 9)    Past Medical History    1  History of Acute bronchitis (466 0) (J20 9)   2  History of appendicitis (V12 79) (Z87 19)    Surgical History    1  History of Appendectomy   2  History of Endarterectomy   3  History of Hemorrhoidectomy   4  History of Incisional Breast Biopsy   5  History of PTA Femoral-Popliteal Right   6  History of PTA Iliac Initial Stenosis With Stent   7   History of Tonsillectomy   8  History of Wound Care Debridement Non-Selective    Family History  Mother    1  Family history of Acute Myocardial Infarction (V17 3)  Father    2  Family history of Liver Cancer  Sister    3  Family history of Kidney Cancer (V16 51)   4  Family history of Kidney Cancer (V16 51)   5  Family history of Lung Cancer (V16 1)   6  Family history of Lung Cancer (V16 1)    Social History    · Alcohol Use (History)   · Cigarette smoker (305 1) (F17 210)   · Current every day smoker (305 1) (F17 200)   · Current some day smoker (305 1) (F17 200)   · Currently attempting to quit smoking (305 1) (Z72 0)   · Daily Coffee Consumption (2  Cups/Day)    Current Meds   1  Aspirin 81 MG TABS; Therapy: (Fide Boles) to Recorded   2  Calcium 600 + D TABS; Therapy: (Recorded:20Nov2014) to Recorded   3  Cilostazol 100 MG Oral Tablet; TAKE 1 TABLET TWICE DAILY; Therapy: 72BCH4109 to (Last Rx:27Toh2567)  Requested for: 08Sep2016 Ordered   4  CVS Vitamin D CAPS; Therapy: (Navjot Xie) to Recorded   5  Lidocaine HCl - 4 % External Solution; APPLY 2  [facility] External; To Be Done: 96NFG5228;   Status: HOLD FOR - Administration Ordered   6  OxyCODONE HCl - 5 MG Oral Tablet; TAKE 1 TO 2 TABLETS EVERY 4 HOURS AS   NEEDED FOR PAIN;   Therapy: 61WUN9833 to (Evaluate:35Eyt8211); Last Rx:06Nxh6494 Ordered   7  Promethazine HCl - 12 5 MG Oral Tablet; TAKE 1 TABLET EVERY 6 HOURS AS NEEDED   FOR NAUSEA; Therapy: 84XMZ1994 to (Evaluate:77Har1246)  Requested for: 70QAH5755; Last   Rx:17Pay9007 Ordered   8  Simvastatin 40 MG Oral Tablet; TAKE ONE TABLET BY MOUTH EVERY DAY; Therapy: 27JQH0142 to (Conchetta Pop)  Requested for: 41DLB6206; Last   TK:22BGJ6140 Ordered   9  Vitamin C TABS; Therapy: (Recorded:16Jnz8057) to Recorded    Allergies    1  CeleBREX CAPS    2  Adhesive Tape   3  Chocolate   4  Other    Health Management  Colon cancer screening   COLONOSCOPY; every 10 years;  Next Due: S5494932; Overdue  Hyperlipidemia   (1) HEPATIC FUNCTION PANEL; every 6 months; Last 34PJG2609; Next Due: 34Osg8677; Overdue  (1) LIPID PANEL, FASTING; every 1 year; Last 33RYY0561; Next Due: 03Jun2017; Active  Health Maintenance   Medicare Annual Wellness Visit; every 1 year; Next Due: 61NOB3083; Overdue    Future Appointments    Date/Time Provider Specialty Site   01/06/2017 08:00 [place] [address]   12/13/2016 02:30 PM BERNARDA Fuentes   Orthopedic Surgery [facility]     Signatures   Electronically signed by : Krista Sequeira DO; Dec 13 2016 12:39PM EST                       (Author)

## 2018-01-14 VITALS
TEMPERATURE: 98.7 F | DIASTOLIC BLOOD PRESSURE: 82 MMHG | RESPIRATION RATE: 16 BRPM | SYSTOLIC BLOOD PRESSURE: 148 MMHG | WEIGHT: 146 LBS | HEIGHT: 61 IN | HEART RATE: 72 BPM | BODY MASS INDEX: 27.56 KG/M2

## 2018-01-14 VITALS
DIASTOLIC BLOOD PRESSURE: 80 MMHG | TEMPERATURE: 99.1 F | HEART RATE: 76 BPM | RESPIRATION RATE: 16 BRPM | SYSTOLIC BLOOD PRESSURE: 138 MMHG | BODY MASS INDEX: 27.56 KG/M2 | HEIGHT: 61 IN | WEIGHT: 146 LBS

## 2018-01-14 NOTE — PROGRESS NOTES
Assessment    1  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)   (I70 219)    Plan  Atherosclerosis of native artery of extremity with intermittent claudication    · Cilostazol 100 MG Oral Tablet; TAKE 1 TABLET TWICE DAILY   Rx By: Jeannie Carrillo; Dispense: 0 Days ; #:90 Tablet; Refill: 3; For: Atherosclerosis of native artery of extremity with intermittent claudication; GIANFRANCO = N; Sent To: [place] [id number]   · Follow-up visit in 4 Months Evaluation and Treatment  Follow-up  Status: Hold For -  Scheduling  Requested for: 75Trg2610   Ordered; For: Atherosclerosis of native artery of extremity with intermittent claudication; Ordered By: Jeannie Carrillo Performed:  Due: 96IPW3410    Discussion/Summary  Discussion Summary:   Continues with short distance claudication symptoms, she is status post endovascular intervention right lower extremity with less than optimal result however she also has near image lesions in her left leg as well  I long discussion with her in the office today in while there is a more aggressive endovascular approach I'm not sure that it would be appropriate given her claudication only symptoms  I am starting her on Pletal for its anticlaudication effect and we'll see her back in the office in 3 or 4 months  Counseling Documentation With Imm: The patient was counseled regarding diagnostic results, instructions for management, risk factor reductions, prognosis, risks and benefits of treatment options, importance of compliance with treatment  total time of encounter was 15 minutes and 15 minutes was spent counseling  Chief Complaint  Chief Complaint Free Text Note Form: " I am here to follow up about my legs "    Pt had a doppler done on 07/08/2016  Pt c/o pain in both legs  Pt c/o pain when walking  Pt c/o swelling in her right leg but has gotten better over time  Pt denies any numbness  Pt denies any open wounds or sores   Pt does not offer any other concerns at this time  Pt takes a baby Aspirin daily  History of Present Illness  HPI: History of claudication only bilateral lower extremities 1-2 blocks      Review of Systems  Complete Female - Vasc:   Constitutional: No fever or chills, feels well, no tiredness, no recent weight gain or weight loss and as noted in HPI  Eyes: No sudden vision loss, no blurred vision, no double vision  ENT: no loss of hearing, no nosebleeds, no hoarseness  Cardiovascular: no chest pain, regular heart rate  Respiratory: No sob, no wheezing, no cough, no sob with exertion, no orthopnea  Gastrointestinal: No nausea, No vomiting, no diarrhea, no blood in stool  Genitourinary: no dysuria, no Hematuria,no urinary incontinence  Musculoskeletal: no limb pain, no limb swelling  Integumentary: no rash, no lesions, no wounds, no ulcer  Neurological: no dementia, no headache, no numbness, no limb weakness, no dizziness, no difficulty walking  Psychiatric: no depression, no mood disorders, no anxiety  Active Problems    1  Asymptomatic menopausal state (V49 81) (Z78 0)   2  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)   (I70 219)   3  Chronic obstructive pulmonary disease (496) (J44 9)   4  Colon cancer screening (V76 51) (Z12 11)   5  Current every day smoker (305 1) (F17 200)   6  DDD (degenerative disc disease) (722 6)   7  Dermatitis (692 9) (L30 9)   8  Edema (782 3) (R60 9)   9  Hyperlipidemia (272 4) (E78 5)   10  Iliac artery stenosis, bilateral (447 1) (I77 1)   11  Non-healing surgical wound (998 83) (T81 89XA)   12  Occlusion and stenosis of carotid artery (433 10) (I65 29)   13  Open wound of groin (879 4) (S31 109A)   14  Osteoarthritis (715 90) (M19 90)   15  Osteopenia (733 90) (M85 80)   16  Postoperative seroma, subsequent encounter   17  Postoperative state (V45 89) (Z98 89)   18  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)   19  Vitamin D deficiency (268 9) (E55 9)    Past Medical History    1  History of Acute bronchitis (466 0) (J20 9)   2  History of appendicitis (V12 79) (Z87 19)  Active Problems And Past Medical History Reviewed: The active problems and past medical history were reviewed and updated today  Surgical History    1  History of Appendectomy   2  History of Endarterectomy   3  History of Hemorrhoidectomy   4  History of Incisional Breast Biopsy   5  History of PTA Femoral-Popliteal Right   6  History of PTA Iliac Initial Stenosis With Stent   7  History of Tonsillectomy   8  History of Wound Care Debridement Non-Selective  Surgical History Reviewed: The surgical history was reviewed and updated today  Family History  Mother    1  Family history of Acute Myocardial Infarction (V17 3)  Father    2  Family history of Liver Cancer  Sister    3  Family history of Kidney Cancer (V16 51)   4  Family history of Kidney Cancer (V16 51)   5  Family history of Lung Cancer (V16 1)   6  Family history of Lung Cancer (V16 1)  Family History Reviewed: The family history was reviewed and updated today  Social History    · Alcohol Use (History)   · Cigarette smoker (305 1) (F17 210)   · Current every day smoker (305 1) (F17 200)   · Current some day smoker (305 1) (F17 210)   · Currently attempting to quit smoking (305 1) (Z72 0)   · Daily Coffee Consumption (2  Cups/Day)  Social History Reviewed: The social history was reviewed and updated today  Current Meds   1  Aspirin 81 MG TABS; Therapy: ([place] Fast) to Recorded   2  Calcium 600 + D TABS; Therapy: (Recorded:20Nov2014) to Recorded   3  CVS Vitamin D CAPS; Therapy: (0493 50 35 13) to Recorded   4  Lidocaine HCl - 4 % External Solution; APPLY 2  [facility] External; To Be Done: 36HOJ5854;   Status: HOLD FOR - Administration Ordered   5  Plavix 75 MG Oral Tablet; Therapy: (Recorded:07Apr2016) to Recorded   6   Simvastatin 40 MG Oral Tablet; TAKE ONE TABLET BY MOUTH EVERY DAY; Therapy: 19DUP9204 to (Mellisa Colvalery)  Requested for: 22YDT7242; Last   HH:48OZF7756 Ordered   7  Vitamin C TABS; Therapy: (Micaela Rider) to Recorded  Medication List Reviewed: The medication list was reviewed and updated today  Allergies    1  CeleBREX CAPS    2  Adhesive Tape   3  Chocolate   4   Other    Vitals  Vital Signs    Recorded: 40JSG8076 09:28RN   Systolic 892, LUE, Sitting   Diastolic 80, LUE, Sitting   Heart Rate 88   Respiration 16   Height 5 ft 1 in   Weight 158 lb    BMI Calculated 29 85   BSA Calculated 1 71     Future Appointments    Date/Time Provider Specialty Site   09/09/2016 01:15 PM [facility], [address]     Signatures   Electronically signed by : Josee Kelly MD; Sep  8 2016  9:53AM EST                       (Author)

## 2018-01-15 VITALS
DIASTOLIC BLOOD PRESSURE: 79 MMHG | BODY MASS INDEX: 27.83 KG/M2 | HEART RATE: 108 BPM | WEIGHT: 147.38 LBS | SYSTOLIC BLOOD PRESSURE: 168 MMHG | HEIGHT: 61 IN

## 2018-01-15 NOTE — RESULT NOTES
Discussion/Summary   Labs ok, cont present Tx  Verified Results  (1) CBC/PLT/DIFF [id number] 08:43AM Sammy Larry Order Number: [id number]     Test Name Result Flag Reference   WBC COUNT 7 82 Thousand/uL  4 31-10 16   RBC COUNT 4 18 Million/uL  3 81-5 12   HEMOGLOBIN 14 8 g/dL  11 5-15 4   HEMATOCRIT 42 6 %  34 8-46  1    fL H 82-98   MCH 35 4 pg H 26 8-34 3   MCHC 34 7 g/dL  31 4-37 4   RDW 13 4 %  11 6-15 1   MPV 10 4 fL  8 9-12 7   PLATELET COUNT 871 Thousands/uL  149-390   nRBC AUTOMATED 0 /100 WBCs     NEUTROPHILS RELATIVE PERCENT 46 %  43-75   LYMPHOCYTES RELATIVE PERCENT 43 %  14-44   MONOCYTES RELATIVE PERCENT 9 %  4-12   EOSINOPHILS RELATIVE PERCENT 2 %  0-6   BASOPHILS RELATIVE PERCENT 0 %  0-1   NEUTROPHILS ABSOLUTE COUNT 3 55 Thousands/? ??L  1 85-7 62   LYMPHOCYTES ABSOLUTE COUNT 3 35 Thousands/? ??L  0 60-4 47   MONOCYTES ABSOLUTE COUNT 0 71 Thousand/? ??L  0 17-1 22   EOSINOPHILS ABSOLUTE COUNT 0 17 Thousand/? ??L  0 00-0 61   BASOPHILS ABSOLUTE COUNT 0 02 Thousands/? ??L  0 00-0 10     (1) COMPREHENSIVE METABOLIC PANEL [id number] 85:18DR Sammy Larry Order Number: [id number]     Test Name Result Flag Reference   SODIUM 138 mmol/L  136-145   POTASSIUM 4 6 mmol/L  3 5-5 3   CHLORIDE 105 mmol/L  100-108   CARBON DIOXIDE 27 mmol/L  21-32   ANION GAP (CALC) 6 mmol/L  4-13   BLOOD UREA NITROGEN 14 mg/dL  5-25   CREATININE 0 80 mg/dL  0 60-1 30   Standardized to IDMS reference method   CALCIUM 9 8 mg/dL  8 3-10 1   BILI, TOTAL 0 82 mg/dL  0 20-1 00   ALK PHOSPHATAS 106 U/L     ALT (SGPT) 19 U/L  12-78   Specimen collection should occur prior to Sulfasalazine and/or Sulfapyridine administration due to the potential for falsely depressed results  AST(SGOT) 14 U/L  5-45   Specimen collection should occur prior to Sulfasalazine administration due to the potential for falsely depressed results     ALBUMIN 4 3 g/dL  3 5-5 0   TOTAL PROTEIN 7 6 g/dL  6 4-8 2   eGFR 70 ml/min/1 73sq m     [facility] Disease Education Program recommendations are as follows:  GFR calculation is accurate only with a steady state creatinine  Chronic Kidney disease less than 60 ml/min/1 73 sq  meters  Kidney failure less than 15 ml/min/1 73 sq  meters  GLUCOSE FASTING 90 mg/dL  65-99   Specimen collection should occur prior to Sulfasalazine administration due to the potential for falsely depressed results  Specimen collection should occur prior to Sulfapyridine administration due to the potential for falsely elevated results  (1) LIPID PANEL, FASTING [id number] 08:43AM [facility] Order Number: [id number]     Test Name Result Flag Reference   CHOLESTEROL 184 mg/dL     HDL,DIRECT 71 mg/dL H 40-60   Specimen collection should occur prior to Metamizole administration due to the potential for falsley depressed results  LDL CHOLESTEROL CALCULATED 72 mg/dL  0-100   Triglyceride:        Normal <150 mg/dl   Borderline High 150-199 mg/dl   High 200-499 mg/dl   Very High >499 mg/dl      Cholesterol:       Desirable <200 mg/dl    Borderline High 200-239 mg/dl    High >239 mg/dl      HDL Cholesterol:       High>59 mg/dL    Low <41 mg/dL      This screening LDL is a calculated result  It does not have the accuracy of the Direct Measured LDL in the monitoring of patients with hyperlipidemia and/or statin therapy  Direct Measure LDL (YCN801) must be ordered separately in these patients  TRIGLYCERIDES 205 mg/dL H <=150   Specimen collection should occur prior to N-Acetylcysteine or Metamizole administration due to the potential for falsely depressed results  (1) TSH WITH FT4 REFLEX [id number] 08:43AM [facility] Order Number: [id number]     Test Name Result Flag Reference   TSH 1 110 uIU/mL  0 358-3 740   Patients undergoing fluorescein dye angiography may retain small amounts of fluorescein in the body for 48-72 hours post procedure   Samples containing fluorescein can produce falsely depressed TSH values  If the patient had this procedure,a specimen should be resubmitted post fluorescein clearance  The recommended reference ranges for TSH during pregnancy are as follows:  First trimester 0 1 to 2 5 uIU/mL  Second trimester  0 2 to 3 0 uIU/mL  Third trimester 0 3 to 3 0 uIU/m     (1) VITAMIN D 25-HYDROXY [id number] 08:43AM [facility] Order Number: [id number]     Test Name Result Flag Reference   VIT D 25-HYDROX 29 1 ng/mL L 30 0-100 0   This assay is a certified procedure of the CDC Vitamin D Standardization Certification Program (VDSCP)     Deficiency <20ng/ml   Insufficiency 20-30ng/ml   Sufficient  ng/ml     *Patients undergoing fluorescein dye angiography may retain small amounts of fluorescein in the body for 48-72 hours post procedure  Samples containing fluorescein can produce falsely elevated Vitamin D values  If the patient had this procedure, a specimen should be resubmitted post fluorescein clearance       (1) URINALYSIS w URINE C/S REFLEX (will reflex a microscopy if leukocytes, occult blood, or nitrites are not within normal limits) [id number] 08:43AM [facility] Order Number: [id number]     Test Name Result Flag Reference   COLOR Yellow     CLARITY Clear     PH UA 7 0  4 5-8 0   LEUKOCYTE ESTERASE UA Small A Negative   NITRITE UA Negative  Negative   PROTEIN UA Trace mg/dl A Negative   GLUCOSE UA Negative mg/dl  Negative   KETONES UA Negative mg/dl  Negative   UROBILINOGEN UA 0 2 E U /dl  0 2, 1 0 E U /dl   BILIRUBIN UA Negative  Negative   BLOOD UA Negative  Negative   SPECIFIC GRAVITY UA 1 018  1 003-1 030   BACTERIA Occasional /hpf  None Seen, Occasional   EPITHELIAL CELLS Occasional /hpf  None Seen, Occasional   HYALINE CASTS None Seen /lpf  None Seen   RBC UA 2-4 /hpf A None Seen, 0-5   WBC UA 4-10 /hpf A None Seen, 0-5, 5-55, 5-65       Plan  Hyperlipidemia    · (1) LIPID PANEL, FASTING ; every 1 year;  Last 85VGG4993; Next 61OMO9515;  Status:Active

## 2018-01-18 NOTE — PROGRESS NOTES
Assessment    1  Chronic obstructive pulmonary disease (496) (J44 9)   2  Hyperlipidemia (272 4) (E78 5)   3  Osteoarthritis (715 90) (M19 90)   4  Vitamin D deficiency (268 9) (E55 9)   5  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)   (I70 219)   6  Current every day smoker (305 1) (F17 200)   7  DDD (degenerative disc disease) (722 6)   8  Osteopenia (733 90) (M85 80)    Plan  Atherosclerosis of native artery of extremity with intermittent claudication    · 1 - Slim ROA, Dia Noonan  (Vascular Surgery) Physician Referral  Consult  Status: Active   Requested for: 78HIX8146  Care Summary provided  : Yes  Hyperlipidemia    · A diet that is low in fat, cholesterol, and sodium is considered a cardiac diet ;  Status:Complete;   Done: 07RAK5670   · Begin a limited exercise program ; Status:Complete;   Done: 82CVF3671   · Eat a low fat and low cholesterol diet ; Status:Complete;   Done: 80OOO5706   · Eat no more than 30 grams of fat per day ; Status:Complete;   Done: 27CTO6302   · Keep a diary of when and what you eat ; Status:Complete;   Done: 83MKT7750   · There are many exercise options for seniors ; Status:Complete;   Done: 83BGM7508  SocHx: Current every day smoker    · Decreasing the stress in your life may help your condition improve ; Status:Complete;    Done: 52CON4616   · You need to quit smoking ; Status:Complete;   Done: 24MHP6739    Discussion/Summary    Patient to continue present treatment  He is instructed to follow a low-fat diet and to get regular exercise walking as tolerated  Recommend patient discontinue smoking  Patient being referred back to Dr Rex Messina at [place] [address] vascular surgery for evaluation  Patient return the office in 3 months for an appointment and fasting labs  Possible side effects of new medications were reviewed with the patient/guardian today  The treatment plan was reviewed with the patient/guardian   The patient/guardian understands and agrees with the treatment plan      Chief Complaint  routine   Patient is here today for follow up of chronic conditions described in HPI  History of Present Illness  Patient is here for routine appointment for chronic conditions and she is not fasting today  Patient has been feeling well overall although admits to occasional tender pain on her right leg  No regular exercise program although patient continues to work full-time  Patient continues to smoke 4-5 cigarettes per day  Patient is due for followup appointment with Dr Zimmerman Client at [place] [address] vascular surgery  The patient states she has been stable with her COPD control since the last visit  She has no significant interval events  Symptoms: stable dyspnea on exertion, stable exercise intolerance, stable coughing, stable coughing up sputum, stable wheezing and stable lower extremity edema  Associated Symptoms: no fever  rescue inhaler use  Interval Triggers: upper respiratory tract infection  Medications: The patient is not currently on any medications for her COPD  Disease Management: the patient is doing well with her COPD goals  The patient states her hyperlipidemia has been under good control since the last visit  Comorbid Illnesses: peripheral vascular disease  She has no significant interval events  Symptoms: denies chest pain, improved intermittent leg claudication, denies muscle pain and denies muscle weakness  Associated symptoms include no memory loss  Medications: the patient is adherent with her medication regimen  She denies medication side effects  The patient is doing well with her hyperlipidemia goals  the patient's last LDL was 78 mg/dL  The patient is being seen for follow-up of vitamin D deficiency  Disease type: vitamin D deficiency  Current treatment includes dietary vitamin D, dietary calcium and vitamin D3 (cholecalciferol)   Symptoms:  no fatigue, no bone pain, no muscle pain, no muscle weakness, no muscle cramps, no muscle twitching, no paresthesias and no gait abnormality  The patient is currently experiencing symptoms  The patient states her osteoarthritis has been stable since the last visit  The patient's osteoarthritis has not resulted in physical disability  She has no significant interval events  Symptoms: denies knee pain, denies finger pain, denies shoulder pain, denies neck pain and stable back pain    The patient presents with complaints of right hip stable hip pain  Associated symptoms include no localized joint swelling and no localized joint stiffness  Activities: able to do activities of daily living without limitations and able to do housework without limitations  Medications: The patient is not currently on any medications for her osteoarthritis  Disease Management: the patient is doing well with her osteoarthritis goals  Review of Systems    Constitutional: no fever, not feeling poorly, no chills and not feeling tired  Eyes: No complaints of eye pain, no red eyes, no eyesight problems, no discharge, no dry eyes, no itching of eyes  ENT: no complaints of earache, no loss of hearing, no nose bleeds, no nasal discharge, no sore throat, no hoarseness  Gastrointestinal: No complaints of abdominal pain, no constipation, no nausea or vomiting, no diarrhea, no bloody stools  Genitourinary: no dysuria and no incontinence  Hematologic/Lymphatic: No complaints of swollen glands, no swollen glands in the neck, does not bleed easily, does not bruise easily  Active Problems    1  Asymptomatic menopausal state (V49 81) (Z78 0)   2  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)   (I70 219)   3  Chronic obstructive pulmonary disease (496) (J44 9)   4  Colon cancer screening (V76 51) (Z12 11)   5  Current every day smoker (305 1) (F17 200)   6  DDD (degenerative disc disease) (722 6)   7  Dermatitis (692 9) (L30 9)   8  Edema (782 3) (R60 9)   9  Hyperlipidemia (272 4) (E78 5)   10  Iliac artery stenosis, bilateral (447 1) (I77 1)   11  Non-healing surgical wound (998 83) (T81 89XA)   12  Occlusion and stenosis of carotid artery (433 10) (I65 29)   13  Open wound of groin (879 4) (S31 109A)   14  Osteoarthritis (715 90) (M19 90)   15  Osteopenia (733 90) (M85 80)   16  Postoperative seroma, subsequent encounter (V58 89,998 13) (T88 8XXD)   17  Postoperative state (V45 89) (Z98 89)   18  Vitamin D deficiency (268 9) (E55 9)    Past Medical History    1  History of Acute bronchitis (466 0) (J20 9)   2  History of appendicitis (V12 79) (Z87 19)    Surgical History    1  History of Appendectomy   2  History of Endarterectomy   3  History of Hemorrhoidectomy   4  History of Incisional Breast Biopsy   5  History of PTA Iliac Initial Stenosis With Stent   6  History of Tonsillectomy   7  History of Wound Care Debridement Non-Selective    Family History    1  Family history of Acute Myocardial Infarction (V17 3)    2  Family history of Liver Cancer    3  Family history of Kidney Cancer (V16 51)   4  Family history of Kidney Cancer (V16 51)   5  Family history of Lung Cancer (V16 1)   6  Family history of Lung Cancer (V16 1)    Social History    · Alcohol Use (History)   · Cigarette smoker (305 1) (F17 210)   · Current every day smoker (305 1) (F17 200)   · Current some day smoker (305 1) (F17 210)   · Currently attempting to quit smoking (305 1) (Z72 0)   · Daily Coffee Consumption (2  Cups/Day)    Current Meds   1  Aspirin 81 MG Oral Tablet; Therapy: (Holland Schwartz) to Recorded   2  Calcium 600 + D TABS; Therapy: (Recorded:20Nov2014) to Recorded   3  [employer] Vitamin D CAPS; Therapy: (Bisi Welsh) to Recorded   4  Lidocaine HCl - 4 % External Solution; APPLY 2  [facility] External; To Be Done: 70OPX7055;   Status: HOLD FOR - Administration Ordered   5  Simvastatin 40 MG Oral Tablet; TAKE ONE TABLET BY MOUTH EVERY DAY;    Therapy: 89PUM6279 to (Evaluate:54Ffx7507)  Requested for: 77Yfd5535; Last   Rx:82Ngl1873 Ordered   6  Vitamin C TABS; Therapy: (Recorded:07Ptw1811) to Recorded    Allergies    1  CeleBREX CAPS    2  Adhesive Tape    Vitals  Vital Signs [Data Includes: Current Encounter]    Recorded: 72NUZ9491 03:41PM Recorded: 53HNS0617 02:56PM   Heart Rate 76    Respiration 16    Systolic 391    Diastolic 84    Height  5 ft 1 5 in   Weight  160 lb    BMI Calculated  29 74   BSA Calculated  1 73     Physical Exam    Constitutional   General appearance: No acute distress, well appearing and well nourished  Eyes   Conjunctiva and lids: No swelling, erythema or discharge  Ears, Nose, Mouth, and Throat   External inspection of ears and nose: Normal     Otoscopic examination: Tympanic membranes translucent with normal light reflex  Canals patent without erythema  Nasal mucosa, septum, and turbinates: Normal without edema or erythema  Oropharynx: Normal with no erythema, edema, exudate or lesions  Pulmonary   Respiratory effort: No increased work of breathing or signs of respiratory distress  Auscultation of lungs: Abnormal   Auscultation of the lungs revealed decreased breath sounds diffusely, but no expiratory wheezing and no inspiratory wheezing  Cardiovascular   Auscultation of heart: Normal rate and rhythm, normal S1 and S2, without murmurs  Examination of extremities for edema and/or varicosities: Normal     Carotid pulses: Normal     Abdomen   Abdomen: Non-tender, no masses  Lymphatic   Palpation of lymph nodes in neck: No lymphadenopathy  Musculoskeletal   Gait and station: Normal     Inspection/palpation of joints, bones, and muscles: Normal     Skin   Skin and subcutaneous tissue: Normal without rashes or lesions  Psychiatric   Orientation to person, place, and time: Normal     Mood and affect: Normal          Health Management  Colon cancer screening   COLONOSCOPY; every 10 years;  Next Due: R4203486; Overdue  Hyperlipidemia   (1) HEPATIC FUNCTION PANEL; every 6 months; Last 15WXQ9405; Next Due: 32Hjb4299; Overdue  (1) LIPID PANEL, FASTING; every 1 year; Last 45VWB0018; Next Due: 09KAC4318; Active  Health Maintenance   Medicare Annual Wellness Visit; every 1 year; Next Due: 80XZS3275;  Overdue    Future Appointments    Date/Time Provider Specialty Site   04/29/2016 09:00 AM BERNARDA Wahl , [facility] 13   Electronically signed by : BERNARDA Abdi DO; Jan 22 2016  4:25PM EST                       (Author)

## 2018-01-22 VITALS
WEIGHT: 146 LBS | HEART RATE: 67 BPM | BODY MASS INDEX: 27.59 KG/M2 | DIASTOLIC BLOOD PRESSURE: 77 MMHG | SYSTOLIC BLOOD PRESSURE: 173 MMHG

## 2018-04-06 ENCOUNTER — OFFICE VISIT (OUTPATIENT)
Dept: FAMILY MEDICINE CLINIC | Facility: CLINIC | Age: 78
End: 2018-04-06
Payer: COMMERCIAL

## 2018-04-06 VITALS
WEIGHT: 145 LBS | SYSTOLIC BLOOD PRESSURE: 140 MMHG | TEMPERATURE: 98.4 F | HEART RATE: 64 BPM | BODY MASS INDEX: 26.68 KG/M2 | DIASTOLIC BLOOD PRESSURE: 80 MMHG | HEIGHT: 62 IN | RESPIRATION RATE: 16 BRPM

## 2018-04-06 DIAGNOSIS — I77.9 PERIPHERAL ARTERIAL OCCLUSIVE DISEASE (HCC): ICD-10-CM

## 2018-04-06 DIAGNOSIS — E78.5 HYPERLIPIDEMIA, UNSPECIFIED HYPERLIPIDEMIA TYPE: Primary | ICD-10-CM

## 2018-04-06 DIAGNOSIS — M85.80 OSTEOPENIA, UNSPECIFIED LOCATION: ICD-10-CM

## 2018-04-06 DIAGNOSIS — J44.9 CHRONIC OBSTRUCTIVE PULMONARY DISEASE, UNSPECIFIED COPD TYPE (HCC): ICD-10-CM

## 2018-04-06 DIAGNOSIS — E55.9 VITAMIN D DEFICIENCY: ICD-10-CM

## 2018-04-06 DIAGNOSIS — M15.9 PRIMARY OSTEOARTHRITIS INVOLVING MULTIPLE JOINTS: ICD-10-CM

## 2018-04-06 PROCEDURE — 3008F BODY MASS INDEX DOCD: CPT | Performed by: FAMILY MEDICINE

## 2018-04-06 PROCEDURE — 99214 OFFICE O/P EST MOD 30 MIN: CPT | Performed by: FAMILY MEDICINE

## 2018-04-06 PROCEDURE — 3725F SCREEN DEPRESSION PERFORMED: CPT | Performed by: FAMILY MEDICINE

## 2018-04-06 PROCEDURE — 1160F RVW MEDS BY RX/DR IN RCRD: CPT | Performed by: FAMILY MEDICINE

## 2018-04-06 RX ORDER — COVID-19 ANTIGEN TEST
KIT MISCELLANEOUS
COMMUNITY
End: 2020-06-29

## 2018-04-06 NOTE — ASSESSMENT & PLAN NOTE
Clinically stable  Recommend patient discontinue smoking  Follow up with vascular surgery as scheduled

## 2018-04-06 NOTE — PROGRESS NOTES
Assessment/Plan:  Continue present treatment  Follow up with specialists as scheduled  Return to the office in 6 months for appointment and fasting labs  Chronic obstructive pulmonary disease (HCC)  Clinically stable  Patient declines inhaler  Recommend patient discontinue smoking  Peripheral arterial occlusive disease  Clinically stable  Recommend patient discontinue smoking  Follow up with vascular surgery as scheduled  Osteoarthritis  Stable  Continue present treatment with naproxen p r n     Follow up with Orthopedics as scheduled  Osteopenia  Stable  Continue calcium and vitamin-D supplement  Weight-bearing exercise walking as tolerated  Hyperlipidemia  Lipids at goal   Continue present treatment with simvastatin  Low-fat and low-cholesterol diet  Vitamin D deficiency  Continue vitamin-D supplement  Diagnoses and all orders for this visit:    Hyperlipidemia, unspecified hyperlipidemia type    Chronic obstructive pulmonary disease, unspecified COPD type ([facility] [address] )    Primary osteoarthritis involving multiple joints    Vitamin D deficiency    Peripheral arterial occlusive disease (HCC)    Osteopenia, unspecified location    Other orders  -     Naproxen Sodium (ALEVE) 220 MG CAPS; Take by mouth          Subjective:      Patient ID: Trena Ponce is a 66 y o  female  Patient is here for routine appointment for chronic conditions and we reviewed fasting labs from last appointment  Patient has been feeling fairly well overall and 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  Patient continues to smoke 4-5 cigarettes daily  Hyperlipidemia   This is a chronic problem  The problem is controlled  Recent lipid tests were reviewed and are normal  She has no history of diabetes or hypothyroidism  Associated symptoms include leg pain  Pertinent negatives include no chest pain, focal sensory loss, focal weakness, myalgias or shortness of breath   Current antihyperlipidemic treatment includes statins  The current treatment provides significant improvement of lipids  Compliance problems include adherence to exercise  Risk factors for coronary artery disease include dyslipidemia, post-menopausal and family history  Arthritis   Presents for follow-up visit  She complains of pain  She reports no stiffness, joint swelling or joint warmth  The symptoms have been stable  Affected locations include the right shoulder, right knee, right hip and left hip  Pertinent negatives include no diarrhea, dysuria, fatigue, fever, pain at night, pain while resting, rash or weight loss  Compliance with total regimen is 51-75%  The following portions of the patient's history were reviewed and updated as appropriate: allergies, current medications, past family history, past medical history, past social history, past surgical history and problem list     Review of Systems   Constitutional: Negative for activity change, fatigue, fever, unexpected weight change and weight loss  HENT: Negative  Eyes: Negative  Respiratory: Positive for cough  Negative for chest tightness, shortness of breath and wheezing  Cardiovascular: Negative for chest pain, palpitations and leg swelling  Gastrointestinal: Negative for abdominal pain, blood in stool, constipation, diarrhea, nausea and vomiting  Genitourinary: Positive for frequency  Negative for difficulty urinating, dysuria, hematuria and urgency  Musculoskeletal: Positive for arthralgias and arthritis  Negative for back pain, gait problem, joint swelling, myalgias, neck pain, neck stiffness and stiffness  Skin: Negative for rash  Neurological: Positive for light-headedness  Negative for dizziness, focal weakness, syncope, weakness, numbness and headaches  Hematological: Negative for adenopathy  Does not bruise/bleed easily  Psychiatric/Behavioral: Negative for dysphoric mood  The patient is not nervous/anxious  Objective:      /80   Pulse 64   Temp 98 4 °F (36 9 °C)   Resp 16   Ht 5' 1 5" (1 562 m)   Wt 65 8 kg (145 lb)   BMI 26 95 kg/m²          Physical Exam   Constitutional: She is oriented to person, place, and time  She appears well-developed and well-nourished  No distress  HENT:   Head: Normocephalic  Right Ear: External ear normal    Left Ear: External ear normal    Nose: Nose normal    Mouth/Throat: Oropharynx is clear and moist    Eyes: Conjunctivae are normal    Neck: Neck supple  No thyromegaly present  Cardiovascular: Normal rate and regular rhythm  Pulmonary/Chest: Effort normal  She has no wheezes  Decreased breath sounds throughout  Abdominal: Soft  There is no tenderness  Musculoskeletal: She exhibits no edema  Lymphadenopathy:     She has no cervical adenopathy  Neurological: She is alert and oriented to person, place, and time  Skin: Skin is warm and dry  Psychiatric: She has a normal mood and affect

## 2018-12-13 ENCOUNTER — OFFICE VISIT (OUTPATIENT)
Dept: FAMILY MEDICINE CLINIC | Facility: CLINIC | Age: 78
End: 2018-12-13
Payer: COMMERCIAL

## 2018-12-13 VITALS
TEMPERATURE: 98.3 F | HEART RATE: 68 BPM | WEIGHT: 145 LBS | RESPIRATION RATE: 16 BRPM | HEIGHT: 61 IN | SYSTOLIC BLOOD PRESSURE: 138 MMHG | BODY MASS INDEX: 27.38 KG/M2 | DIASTOLIC BLOOD PRESSURE: 74 MMHG

## 2018-12-13 DIAGNOSIS — F17.200 SMOKING: ICD-10-CM

## 2018-12-13 DIAGNOSIS — M85.80 OSTEOPENIA, UNSPECIFIED LOCATION: ICD-10-CM

## 2018-12-13 DIAGNOSIS — M15.9 PRIMARY OSTEOARTHRITIS INVOLVING MULTIPLE JOINTS: ICD-10-CM

## 2018-12-13 DIAGNOSIS — I65.23 CAROTID STENOSIS, BILATERAL: ICD-10-CM

## 2018-12-13 DIAGNOSIS — I77.9 PERIPHERAL ARTERIAL OCCLUSIVE DISEASE (HCC): ICD-10-CM

## 2018-12-13 DIAGNOSIS — E78.5 HYPERLIPIDEMIA, UNSPECIFIED HYPERLIPIDEMIA TYPE: Primary | ICD-10-CM

## 2018-12-13 DIAGNOSIS — J44.9 CHRONIC OBSTRUCTIVE PULMONARY DISEASE, UNSPECIFIED COPD TYPE (HCC): ICD-10-CM

## 2018-12-13 DIAGNOSIS — E55.9 VITAMIN D DEFICIENCY: ICD-10-CM

## 2018-12-13 PROCEDURE — 90662 IIV NO PRSV INCREASED AG IM: CPT

## 2018-12-13 PROCEDURE — 1160F RVW MEDS BY RX/DR IN RCRD: CPT | Performed by: FAMILY MEDICINE

## 2018-12-13 PROCEDURE — G0008 ADMIN INFLUENZA VIRUS VAC: HCPCS

## 2018-12-13 PROCEDURE — 99214 OFFICE O/P EST MOD 30 MIN: CPT | Performed by: FAMILY MEDICINE

## 2018-12-13 PROCEDURE — 3008F BODY MASS INDEX DOCD: CPT | Performed by: FAMILY MEDICINE

## 2018-12-13 RX ORDER — MULTIVITAMIN
1 TABLET ORAL DAILY
COMMUNITY
End: 2020-11-05 | Stop reason: ALTCHOICE

## 2018-12-13 NOTE — PROGRESS NOTES
Assessment/Plan:  Patient received high-dose flu vaccine today  Fasting labs drawn as below  Continue present treatment  Recommend carotid ultrasound although patient declines at this time  Recommend patient discontinue smoking  Return to the office in 4-6 months  Chronic obstructive pulmonary disease (HCC)  Clinically stable  Recommend patient discontinue smoking  Peripheral arterial occlusive disease  Clinically stable with mild intermittent claudication  Continue present treatment  Carotid stenosis, bilateral  Asymptomatic  Recommend follow-up carotid ultrasound although patient declines at this time  Hyperlipidemia  Lipids well controlled on simvastatin  Fasting lipid profile drawn today  Vitamin D deficiency  Continue vitamin-D supplement  Diagnoses and all orders for this visit:    Hyperlipidemia, unspecified hyperlipidemia type  -     CBC and Platelet  -     Comprehensive metabolic panel  -     Lipid panel  -     TSH, 3rd generation with Free T4 reflex  -     UA w Reflex to Microscopic w Reflex to Culture - Clinic Collect    Chronic obstructive pulmonary disease, unspecified COPD type ([facility] )  -     PREFERRED: influenza vaccine, [id number], high-dose, PF 0 5 mL, for patients 65 yr+ (FLUZONE HIGH-DOSE)    Peripheral arterial occlusive disease (HCC)    Carotid stenosis, bilateral    Primary osteoarthritis involving multiple joints    Osteopenia, unspecified location    Vitamin D deficiency  -     Vitamin D 25 hydroxy    Smoking    Other orders  -     Multiple Vitamin (MULTIVITAMIN) tablet; Take 1 tablet by mouth daily          Subjective:      Patient ID: Velia Ahmadi is a 66 y o  female  Patient is here for routine appointment for chronic conditions and fasting labs  Patient requests yearly flu vaccine  Patient has been feeling fairly well overall and continues to work 32 hr per week  She plans on retiring in March of 2019    Patient continues to smoke 5-6 cigarettes per day       Hyperlipidemia   This is a chronic problem  The problem is controlled  Recent lipid tests were reviewed and are normal  She has no history of chronic renal disease, diabetes or hypothyroidism  Pertinent negatives include no chest pain, focal sensory loss, focal weakness, leg pain, myalgias or shortness of breath  Current antihyperlipidemic treatment includes statins  The current treatment provides significant improvement of lipids  Compliance problems include adherence to exercise and adherence to diet  Risk factors for coronary artery disease include dyslipidemia, family history and post-menopausal    Arthritis   Presents for follow-up visit  She complains of pain  She reports no stiffness, joint swelling or joint warmth  Affected locations include the right knee and right shoulder  Pertinent negatives include no diarrhea, dysuria, fatigue, fever, pain at night or pain while resting  The following portions of the patient's history were reviewed and updated as appropriate: allergies, current medications, past family history, past medical history, past social history, past surgical history and problem list     Review of Systems   Constitutional: Negative for activity change, appetite change, fatigue, fever and unexpected weight change  Respiratory: Positive for cough  Negative for chest tightness, shortness of breath and wheezing  Cardiovascular: Negative for chest pain, palpitations and leg swelling  Gastrointestinal: Negative for abdominal pain, blood in stool, constipation, diarrhea, nausea and vomiting  Genitourinary: Negative for difficulty urinating, dysuria, frequency, hematuria and urgency  Musculoskeletal: Positive for arthralgias, arthritis and back pain  Negative for gait problem, joint swelling, myalgias, neck pain, neck stiffness and stiffness  Neurological: Negative for focal weakness           Objective:      /74   Pulse 68   Temp 98 3 °F (36 8 °C) (Tympanic) Resp 16   Ht 5' 1" (1 549 m)   Wt 65 8 kg (145 lb)   BMI 27 40 kg/m²          Physical Exam   Constitutional: She is oriented to person, place, and time  She appears well-developed and well-nourished  HENT:   Head: Normocephalic  Right Ear: External ear normal    Left Ear: External ear normal    Nose: Nose normal    Mouth/Throat: Oropharynx is clear and moist    Eyes: Conjunctivae are normal  No scleral icterus  Neck: Neck supple  No thyromegaly present  Cardiovascular: Normal rate and regular rhythm  Pulmonary/Chest: Effort normal  She has no wheezes  Decreased breath sounds throughout  Abdominal: Soft  There is no tenderness  Musculoskeletal: She exhibits tenderness  She exhibits no edema  Right knee with decreased range of motion and joint line tenderness  Neurological: She is alert and oriented to person, place, and time  Skin: Skin is warm and dry  Psychiatric: She has a normal mood and affect

## 2018-12-14 ENCOUNTER — OFFICE VISIT (OUTPATIENT)
Dept: OBGYN CLINIC | Facility: MEDICAL CENTER | Age: 78
End: 2018-12-14
Payer: COMMERCIAL

## 2018-12-14 VITALS
SYSTOLIC BLOOD PRESSURE: 168 MMHG | BODY MASS INDEX: 26.13 KG/M2 | HEIGHT: 62 IN | HEART RATE: 80 BPM | DIASTOLIC BLOOD PRESSURE: 73 MMHG | WEIGHT: 142 LBS

## 2018-12-14 DIAGNOSIS — S42.291S OTHER CLOSED DISPLACED FRACTURE OF PROXIMAL END OF RIGHT HUMERUS, SEQUELA: Primary | ICD-10-CM

## 2018-12-14 LAB
25(OH)D3 SERPL-MCNC: 32 NG/ML (ref 30–100)
ALBUMIN SERPL-MCNC: 4.5 G/DL (ref 3.6–5.1)
ALBUMIN/GLOB SERPL: 1.6 (CALC) (ref 1–2.5)
ALP SERPL-CCNC: 84 U/L (ref 33–130)
ALT SERPL-CCNC: 11 U/L (ref 6–29)
AST SERPL-CCNC: 13 U/L (ref 10–35)
BILIRUB SERPL-MCNC: 0.8 MG/DL (ref 0.2–1.2)
BUN SERPL-MCNC: 15 MG/DL (ref 7–25)
BUN/CREAT SERPL: NORMAL (CALC) (ref 6–22)
CALCIUM SERPL-MCNC: 9.7 MG/DL (ref 8.6–10.4)
CHLORIDE SERPL-SCNC: 105 MMOL/L (ref 98–110)
CHOLEST SERPL-MCNC: 187 MG/DL
CHOLEST/HDLC SERPL: 2.5 (CALC)
CO2 SERPL-SCNC: 29 MMOL/L (ref 20–32)
COLOR UR: NORMAL
CREAT SERPL-MCNC: 0.78 MG/DL (ref 0.6–0.93)
ERYTHROCYTE [DISTWIDTH] IN BLOOD BY AUTOMATED COUNT: 11.9 % (ref 11–15)
GLOBULIN SER CALC-MCNC: 2.9 G/DL (CALC) (ref 1.9–3.7)
GLUCOSE SERPL-MCNC: 93 MG/DL (ref 65–99)
HCT VFR BLD AUTO: 41.7 % (ref 35–45)
HDLC SERPL-MCNC: 74 MG/DL
HGB BLD-MCNC: 14.2 G/DL (ref 11.7–15.5)
LDLC SERPL CALC-MCNC: 95 MG/DL (CALC)
MCH RBC QN AUTO: 35 PG (ref 27–33)
MCHC RBC AUTO-ENTMCNC: 34.1 G/DL (ref 32–36)
MCV RBC AUTO: 102.7 FL (ref 80–100)
NONHDLC SERPL-MCNC: 113 MG/DL (CALC)
PLATELET # BLD AUTO: 228 THOUSAND/UL (ref 140–400)
PMV BLD REES-ECKER: 11.1 FL (ref 7.5–12.5)
POTASSIUM SERPL-SCNC: 4.4 MMOL/L (ref 3.5–5.3)
PROT SERPL-MCNC: 7.4 G/DL (ref 6.1–8.1)
RBC # BLD AUTO: 4.06 MILLION/UL (ref 3.8–5.1)
SL AMB EGFR AFRICAN AMERICAN: 84 ML/MIN/1.73M2
SL AMB EGFR NON AFRICAN AMERICAN: 73 ML/MIN/1.73M2
SODIUM SERPL-SCNC: 141 MMOL/L (ref 135–146)
TRIGL SERPL-MCNC: 86 MG/DL
TSH SERPL-ACNC: 1.04 MIU/L (ref 0.4–4.5)
WBC # BLD AUTO: 8.7 THOUSAND/UL (ref 3.8–10.8)

## 2018-12-14 PROCEDURE — 4040F PNEUMOC VAC/ADMIN/RCVD: CPT | Performed by: ORTHOPAEDIC SURGERY

## 2018-12-14 PROCEDURE — 99213 OFFICE O/P EST LOW 20 MIN: CPT | Performed by: ORTHOPAEDIC SURGERY

## 2018-12-14 NOTE — PROGRESS NOTES
Orthopaedic Surgery - Office Note  Kris De Jesus (65 y o  female)   : 1940   MRN: [id number]  Encounter Date: 2018    No chief complaint on file  Assessment / Plan  History of right RTSA for fracture on 2016 with excellent result    · Continue activity as tolerated  · I reminded the patient of her 20-25 pound lifting restriction guideline  Return if symptoms worsen or fail to improve  History of Present Illness  Kris De Jesus is a 66 y o  female who presents for routine follow up of her right RTSA for fracture on 16  She has mild aching with use of the shoulder  She is able to perform all ADL's without difficulty  She is pleased with her result and offers no other specific complaints today  Review of Systems  Pertinent items are noted in HPI  All other systems were reviewed and are negative  Physical Exam  /73   Pulse 80   Ht 5' 1 5" (1 562 m)   Wt 64 4 kg (142 lb)   BMI 26 40 kg/m²   Cons: Appears well  No apparent distress  Psych: Alert  Oriented x3  Mood and affect normal   Eyes: PERRLA, EOMI  Resp: Normal effort  No audible wheezing or stridor  CV: Palpable pulse  No discernable arrhythmia  No LE edema  Lymph:  No palpable cervical, axillary, or inguinal lymphadenopathy  Skin: Warm  No palpable masses  No visible lesions  Neuro: Normal muscle tone  Normal and symmetric DTR's  Right Shoulder Exam  Alignment / Posture:  Normal shoulder posture  Inspection:  No swelling  Incision healed  Palpation:  No tenderness  ROM:  Shoulder   Shoulder ER 60  Shoulder IR T8  Strength:  5/5 supraspinatus, infraspinatus, and subscapularis  Stability:  Not tested  Tests: No pertinent positive or negative tests  Neurovascular:  Sensation intact in Ax/R/M/U nerve distributions  2+ radial pulse  Studies Reviewed  No studies to review    Procedures  No procedures today      Medical, Surgical, Family, and Social History  The patient's medical history, family history, and social history, were reviewed and updated as appropriate  Past Medical History:   Diagnosis Date    Food allergy     chocholate    Hyperlipidemia     Peripheral arterial occlusive disease ([address] )     Right humeral fracture     right poximal    Smoking     Wears dentures     full upper    Wears glasses        Past Surgical History:   Procedure Laterality Date    APPENDECTOMY      BREAST SURGERY      gilberto    COLONOSCOPY      HEMORRHOID SURGERY      MN RECONSTR TOTAL SHOULDER IMPLANT Right 12/8/2016    Procedure: ARTHROPLASTY SHOULDER REVERSE;  Surgeon: Vanesa Light MD;  [facility]: [facility];  Service: Orthopedics    THROMBOENDARTERECTOMY      TONSILLECTOMY         No family history on file  Social History     Occupational History    Not on file       Social History Main Topics    Smoking status: Heavy Tobacco Smoker     Packs/day: 0 50     Years: 62 00    Smokeless tobacco: Never Used    Alcohol use 8 4 oz/week     14 Glasses of wine per week      Comment: 2 glasses of wine each day    Drug use: No    Sexual activity: Not on file       Allergies   Allergen Reactions    Celecoxib GI Intolerance    Other Rash     Adhesive tape    Chocolate     Wound Dressing Adhesive      Other reaction(s): Itching         Current Outpatient Prescriptions:     aspirin 81 MG tablet, Take 81 mg by mouth daily, Disp: , Rfl:     Calcium Citrate-Vitamin D (CALCIUM + D PO), Take 2 tablets by mouth daily, Disp: , Rfl:     Multiple Vitamin (MULTIVITAMIN) tablet, Take 1 tablet by mouth daily, Disp: , Rfl:     Naproxen Sodium (ALEVE) 220 MG CAPS, Take by mouth, Disp: , Rfl:     simvastatin (ZOCOR) 5 MG tablet, Take 40 mg by mouth daily at bedtime  , Disp: , Rfl:       Vanesa Light MD    Scribe Attestation    I,:    am acting as a scribe while in the presence of the attending physician :        I,:    personally performed the services described in this documentation    as scribed in my presence :

## 2019-02-22 DIAGNOSIS — E78.5 HYPERLIPIDEMIA, UNSPECIFIED HYPERLIPIDEMIA TYPE: Primary | ICD-10-CM

## 2019-02-22 RX ORDER — SIMVASTATIN 5 MG
40 TABLET ORAL
Qty: 90 TABLET | Refills: 3 | Status: SHIPPED | OUTPATIENT
Start: 2019-02-22 | End: 2019-02-25 | Stop reason: SDUPTHER

## 2019-02-25 DIAGNOSIS — E78.5 HYPERLIPIDEMIA, UNSPECIFIED HYPERLIPIDEMIA TYPE: ICD-10-CM

## 2019-02-25 RX ORDER — SIMVASTATIN 40 MG
40 TABLET ORAL
Qty: 30 TABLET | Refills: 5 | Status: SHIPPED | OUTPATIENT
Start: 2019-02-25 | End: 2019-04-26 | Stop reason: SDUPTHER

## 2019-02-25 NOTE — TELEPHONE ENCOUNTER
Took call from Jakob stating [employer] will not cover 5mg 8 tablets at bedtime, would like refill changed to 40 mg one tablet daily

## 2019-04-26 ENCOUNTER — OFFICE VISIT (OUTPATIENT)
Dept: FAMILY MEDICINE CLINIC | Facility: CLINIC | Age: 79
End: 2019-04-26
Payer: COMMERCIAL

## 2019-04-26 VITALS
DIASTOLIC BLOOD PRESSURE: 80 MMHG | BODY MASS INDEX: 27.38 KG/M2 | SYSTOLIC BLOOD PRESSURE: 140 MMHG | RESPIRATION RATE: 16 BRPM | HEIGHT: 61 IN | HEART RATE: 68 BPM | WEIGHT: 145 LBS

## 2019-04-26 DIAGNOSIS — F17.200 SMOKING: ICD-10-CM

## 2019-04-26 DIAGNOSIS — J44.9 CHRONIC OBSTRUCTIVE PULMONARY DISEASE, UNSPECIFIED COPD TYPE (HCC): ICD-10-CM

## 2019-04-26 DIAGNOSIS — E78.5 HYPERLIPIDEMIA, UNSPECIFIED HYPERLIPIDEMIA TYPE: Primary | ICD-10-CM

## 2019-04-26 DIAGNOSIS — M85.80 OSTEOPENIA, UNSPECIFIED LOCATION: ICD-10-CM

## 2019-04-26 DIAGNOSIS — E55.9 VITAMIN D DEFICIENCY: ICD-10-CM

## 2019-04-26 DIAGNOSIS — I77.9 PERIPHERAL ARTERIAL OCCLUSIVE DISEASE (HCC): ICD-10-CM

## 2019-04-26 DIAGNOSIS — M15.9 PRIMARY OSTEOARTHRITIS INVOLVING MULTIPLE JOINTS: ICD-10-CM

## 2019-04-26 PROCEDURE — 1160F RVW MEDS BY RX/DR IN RCRD: CPT | Performed by: FAMILY MEDICINE

## 2019-04-26 PROCEDURE — 3725F SCREEN DEPRESSION PERFORMED: CPT | Performed by: FAMILY MEDICINE

## 2019-04-26 PROCEDURE — 99214 OFFICE O/P EST MOD 30 MIN: CPT | Performed by: FAMILY MEDICINE

## 2019-04-26 RX ORDER — SIMVASTATIN 40 MG
40 TABLET ORAL
Qty: 90 TABLET | Refills: 3 | Status: SHIPPED | OUTPATIENT
Start: 2019-04-26 | End: 2020-06-12

## 2019-07-18 ENCOUNTER — OFFICE VISIT (OUTPATIENT)
Dept: VASCULAR SURGERY | Facility: CLINIC | Age: 79
End: 2019-07-18
Payer: COMMERCIAL

## 2019-07-18 VITALS
SYSTOLIC BLOOD PRESSURE: 140 MMHG | HEIGHT: 61 IN | WEIGHT: 149 LBS | BODY MASS INDEX: 28.13 KG/M2 | DIASTOLIC BLOOD PRESSURE: 80 MMHG | HEART RATE: 69 BPM

## 2019-07-18 DIAGNOSIS — I70.218 ATHEROSCLEROSIS OF NATIVE ARTERY OF OTHER EXTREMITY WITH INTERMITTENT CLAUDICATION (HCC): Primary | ICD-10-CM

## 2019-07-18 PROCEDURE — 99214 OFFICE O/P EST MOD 30 MIN: CPT | Performed by: SURGERY

## 2019-07-18 NOTE — LETTER
July 18, 2019     Tina Rodriguez, [address] [facility]  [address] [place] [address]    Patient: Lenore Wilson   YOB: 1940   Date of Visit: 7/18/2019       Dear Dr Deondre Cooley: Thank you for referring Vandana Tabares to me for evaluation  Below are my notes for this consultation  If you have questions, please do not hesitate to call me  I look forward to following your patient along with you           Sincerely,        Suman Walker MD        CC: No Recipients

## 2019-07-18 NOTE — PROGRESS NOTES
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)  Complaining of right leg claudication after walking short distances  No symptoms in the left with history of popliteal artery in intervention  On exam she has decreased pulses in the right lower extremity both femoral and distally easily palpable femoral popliteal dorsalis pedis on the left  No ischemic rest pain or tissue loss  Plan:  CT angiogram of the aorta and runoff then follow-up appointment for further evaluation and recommendations       Diagnoses and all orders for this visit:    Atherosclerosis of native artery of other extremity with intermittent claudication ([facility] )        Subjective:      Patient ID: Kristen Gold is a 78 y o  female  HPI disabling claudication right lower extremity with discomfort calf knee and thigh  No symptoms on the left  No ischemic rest pain or tissue loss  The following portions of the patient's history were reviewed and updated as appropriate: allergies, current medications, past family history, past medical history, past social history, past surgical history and problem list     Review of Systems  right lower extremity short distance claudication, no constitutional symptoms no fever fatigue or weight loss, no skin rash no GI or  symptoms, all other systems negative    Objective:      /80 (BP Location: Left arm, Patient Position: Sitting, Cuff Size: Standard)   Pulse 69   Ht 5' 1" (1 549 m)   Wt 67 6 kg (149 lb)   BMI 28 15 kg/m²          Physical Exam          Oriented x3 no evidence of clinical depression  Eyes:  Sclera non-icteric    Skin: normal without evidence of inflammation    Neck is supple carotid pulses equal bilaterally no bruits heard    Chest lungs clear, heart regular rhythm  Abdomen soft nontender no evidence of pulsatile masses      Pulses are palpable femoral popliteal dorsalis pedis on the left decreased femoral on the right    Neurological exam intact cranial nerves 2-12 grossly intact no gross motor sensory deficits detected  Imaging viewed and reviewed with Patient    I have reviewed and made appropriate changes to the review of systems input by the medical assistant  Vitals:    07/18/19 1406   BP: 140/80   BP Location: Left arm   Patient Position: Sitting   Cuff Size: Standard   Pulse: 69   Weight: 67 6 kg (149 lb)   Height: 5' 1" (1 549 m)       Patient Active Problem List   Diagnosis    Smoking    Peripheral arterial occlusive disease (HCC)    Proximal humerus fracture    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)    Chronic obstructive pulmonary disease (HCC)    Degeneration of intervertebral disc    Hyperlipidemia    Iliac artery stenosis, bilateral (HCC)    Carotid stenosis, bilateral    Osteoarthritis    Osteopenia    Varicose veins of right lower extremity with pain    Vitamin D deficiency       Past Surgical History:   Procedure Laterality Date    APPENDECTOMY      BREAST SURGERY      gilberto    COLONOSCOPY      HEMORRHOID SURGERY      [place] RECONSTR TOTAL SHOULDER IMPLANT Right 12/8/2016    Procedure: ARTHROPLASTY SHOULDER REVERSE;  Surgeon: Karel De Leon MD;  [facility];  Service: Orthopedics    THROMBOENDARTERECTOMY      TONSILLECTOMY         No family history on file      Social History     Socioeconomic History    Marital status:      Spouse name: Not on file    Number of children: Not on file    Years of education: Not on file    Highest education level: Not on file   Occupational History    Not on file   Social Needs    Financial resource strain: Not on file    Food insecurity:     Worry: Not on file     Inability: Not on file    Transportation needs:     Medical: Not on file     Non-medical: Not on file   Tobacco Use    Smoking status: Heavy Tobacco Smoker     Packs/day: 0 50     Years: 62 00     Pack years: 31 00    Smokeless tobacco: Never Used   Substance and Sexual Activity    Alcohol use:  Yes     Alcohol/week: 14 0 standard drinks     Types: 14 Glasses of wine per week     Comment: 2 glasses of wine each day    Drug use: No    Sexual activity: Not on file   Lifestyle    Physical activity:     Days per week: Not on file     Minutes per session: Not on file    Stress: Not on file   Relationships    Social connections:     Talks on phone: Not on file     Gets together: Not on file     Attends Denominational service: Not on file     Active member of club or organization: Not on file     Attends meetings of clubs or organizations: Not on file     Relationship status: Not on file    Intimate partner violence:     Fear of current or ex partner: Not on file     Emotionally abused: Not on file     Physically abused: Not on file     Forced sexual activity: Not on file   Other Topics Concern    Not on file   Social History Narrative    Not on file       Allergies   Allergen Reactions    Celecoxib GI Intolerance    Other Rash     Adhesive tape    Chocolate     Cilostazol Edema     rash    Wound Dressing Adhesive      Other reaction(s): Itching         Current Outpatient Medications:     aspirin 81 MG tablet, Take 81 mg by mouth daily, Disp: , Rfl:     Calcium Citrate-Vitamin D (CALCIUM + D PO), Take 2 tablets by mouth daily, Disp: , Rfl:     Multiple Vitamin (MULTIVITAMIN) tablet, Take 1 tablet by mouth daily, Disp: , Rfl:     Naproxen Sodium (ALEVE) 220 MG CAPS, Take by mouth, Disp: , Rfl:     simvastatin (ZOCOR) 40 mg tablet, Take 1 tablet (40 mg total) by mouth daily at bedtime, Disp: 90 tablet, Rfl: 3

## 2019-07-18 NOTE — PATIENT INSTRUCTIONS
Complaining of right leg claudication after walking short distances  No symptoms in the left with history of popliteal artery in intervention  On exam she has decreased pulses in the right lower extremity both femoral and distally easily palpable femoral popliteal dorsalis pedis on the left  No ischemic rest pain or tissue loss      Plan:  CT angiogram of the aorta and runoff then follow-up appointment for further evaluation and recommendations

## 2019-07-29 ENCOUNTER — APPOINTMENT (OUTPATIENT)
Dept: LAB | Facility: HOSPITAL | Age: 79
End: 2019-07-29
Attending: SURGERY
Payer: COMMERCIAL

## 2019-07-29 DIAGNOSIS — I70.218 ATHEROSCLEROSIS OF NATIVE ARTERY OF OTHER EXTREMITY WITH INTERMITTENT CLAUDICATION (HCC): ICD-10-CM

## 2019-07-29 LAB
ANION GAP SERPL CALCULATED.3IONS-SCNC: 9 MMOL/L (ref 4–13)
BUN SERPL-MCNC: 17 MG/DL (ref 5–25)
CALCIUM SERPL-MCNC: 9.4 MG/DL (ref 8.3–10.1)
CHLORIDE SERPL-SCNC: 106 MMOL/L (ref 100–108)
CO2 SERPL-SCNC: 27 MMOL/L (ref 21–32)
CREAT SERPL-MCNC: 0.83 MG/DL (ref 0.6–1.3)
GFR SERPL CREATININE-BSD FRML MDRD: 67 ML/MIN/1.73SQ M
GLUCOSE SERPL-MCNC: 84 MG/DL (ref 65–140)
POTASSIUM SERPL-SCNC: 4.4 MMOL/L (ref 3.5–5.3)
SODIUM SERPL-SCNC: 142 MMOL/L (ref 136–145)

## 2019-07-29 PROCEDURE — 36415 COLL VENOUS BLD VENIPUNCTURE: CPT

## 2019-07-29 PROCEDURE — 80048 BASIC METABOLIC PNL TOTAL CA: CPT

## 2019-08-01 ENCOUNTER — HOSPITAL ENCOUNTER (OUTPATIENT)
Dept: CT IMAGING | Facility: HOSPITAL | Age: 79
Discharge: HOME | End: 2019-08-01
Attending: SURGERY
Payer: COMMERCIAL

## 2019-08-01 DIAGNOSIS — I70.218 ATHEROSCLEROSIS OF NATIVE ARTERY OF OTHER EXTREMITY WITH INTERMITTENT CLAUDICATION (HCC): ICD-10-CM

## 2019-08-01 PROCEDURE — 75635 CT ANGIO ABDOMINAL ARTERIES: CPT

## 2019-08-01 RX ADMIN — IOHEXOL 120 ML: 350 INJECTION, SOLUTION INTRAVENOUS at 15:49

## 2019-08-15 ENCOUNTER — OFFICE VISIT (OUTPATIENT)
Dept: VASCULAR SURGERY | Facility: CLINIC | Age: 79
End: 2019-08-15
Payer: COMMERCIAL

## 2019-08-15 ENCOUNTER — TELEPHONE (OUTPATIENT)
Dept: VASCULAR SURGERY | Facility: CLINIC | Age: 79
End: 2019-08-15

## 2019-08-15 VITALS
HEART RATE: 80 BPM | WEIGHT: 149 LBS | SYSTOLIC BLOOD PRESSURE: 158 MMHG | HEIGHT: 61 IN | BODY MASS INDEX: 28.13 KG/M2 | DIASTOLIC BLOOD PRESSURE: 72 MMHG

## 2019-08-15 DIAGNOSIS — I70.211 ATHEROSCLEROSIS OF NATIVE ARTERY OF RIGHT LOWER EXTREMITY WITH INTERMITTENT CLAUDICATION (HCC): Primary | ICD-10-CM

## 2019-08-15 DIAGNOSIS — I70.211 ATHEROSCLEROSIS OF NATIVE ARTERIES OF EXTREMITIES WITH INTERMITTENT CLAUDICATION, RIGHT LEG (HCC): Primary | ICD-10-CM

## 2019-08-15 PROCEDURE — 99213 OFFICE O/P EST LOW 20 MIN: CPT | Performed by: SURGERY

## 2019-08-15 NOTE — PATIENT INSTRUCTIONS
Disabling claudication right lower extremity CT angiogram shows evidence of intra stent stenosis and proximal right external iliac artery occlusion  Long discussion regarding possible risks and benefits of the procedure she is agreeable to proceed  Plan:  Arteriogram aorta and right runoff with right groin approach possible PTA/stent right iliac system

## 2019-08-15 NOTE — LETTER
August 15, 2019     Drew Bautista, [address] [facility],[address]  [address] [place] [address]    Patient: Almita Chavez   YOB: 1940   Date of Visit: 8/15/2019       Dear Dr Leopold Ivan: Thank you for referring Youngn Officer to me for evaluation  Below are my notes for this consultation  If you have questions, please do not hesitate to call me  I look forward to following your patient along with you           Sincerely,        Kirk Logan MD        CC: No Recipients

## 2019-08-15 NOTE — PROGRESS NOTES
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)  Disabling claudication right lower extremity CT angiogram shows evidence of intra stent stenosis and proximal right external iliac artery occlusion  Long discussion regarding possible risks and benefits of the procedure she is agreeable to proceed  Plan:  Arteriogram aorta and right runoff with right groin approach possible PTA/stent right iliac system  Schedule arteriogram aorta with right iliac intervention right groin approach [facility], continue baby aspirin     Diagnoses and all orders for this visit:    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 (HCC)        Subjective:      Patient ID: Alejo Thapa is a 78 y o  female  HPI disabling claudication right lower extremity history of iliac stenting  CT angiogram shows intra stent stenosis as well as proximal external iliac artery occlusion  Plan for reintervention right groin approach iliac PTA/stent    The following portions of the patient's history were reviewed and updated as appropriate: allergies, current medications, past family history, past medical history, past social history, past surgical history and problem list     Review of Systems  disabling claudication right lower extremity, no constitutional symptoms no fatigue weight loss or fever, no GI or  symptoms, no skin rash, all other systems negative  Objective:      /72 (BP Location: Left arm, Patient Position: Sitting, Cuff Size: Standard)   Pulse 80   Ht 5' 1" (1 549 m)   Wt 67 6 kg (149 lb)   BMI 28 15 kg/m²          Physical Exam      Palpable left femoral pulse no pulse palpable in the right groin history of healed femoral intervention scar    I have reviewed and made appropriate changes to the review of systems input by the medical assistant      Vitals:    08/15/19 1136   BP: 158/72   BP Location: Left arm   Patient Position: Sitting   Cuff Size: Standard   Pulse: 80   Weight: 67 6 kg (149 lb)   Height: 5' 1" (1 549 m)       Patient Active Problem List   Diagnosis    Smoking    Peripheral arterial occlusive disease (HCC)    Proximal humerus fracture    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)    Chronic obstructive pulmonary disease (HCC)    Degeneration of intervertebral disc    Hyperlipidemia    Iliac artery stenosis, bilateral (HCC)    Carotid stenosis, bilateral    Osteoarthritis    Osteopenia    Varicose veins of right lower extremity with pain    Vitamin D deficiency       Past Surgical History:   Procedure Laterality Date    APPENDECTOMY      BREAST SURGERY      gilberto    COLONOSCOPY      HEMORRHOID SURGERY      MD RECONSTR TOTAL SHOULDER IMPLANT Right 12/8/2016    Procedure: ARTHROPLASTY SHOULDER REVERSE;  Surgeon: Jak Ott MD;  [facility];  Service: Orthopedics    THROMBOENDARTERECTOMY      TONSILLECTOMY         No family history on file  Social History     Socioeconomic History    Marital status:      Spouse name: Not on file    Number of children: Not on file    Years of education: Not on file    Highest education level: Not on file   Occupational History    Not on file   Social Needs    Financial resource strain: Not on file    Food insecurity:     Worry: Not on file     Inability: Not on file    Transportation needs:     Medical: Not on file     Non-medical: Not on file   Tobacco Use    Smoking status: Heavy Tobacco Smoker     Packs/day: 0 50     Years: 62 00     Pack years: 31 00    Smokeless tobacco: Never Used   Substance and Sexual Activity    Alcohol use:  Yes     Alcohol/week: 14 0 standard drinks     Types: 14 Glasses of wine per week     Comment: 2 glasses of wine each day    Drug use: No    Sexual activity: Not on file   Lifestyle    Physical activity:     Days per week: Not on file     Minutes per session: Not on file    Stress: Not on file   Relationships    Social connections:     Talks on phone: Not on file     Gets together: Not on file     Attends [employer] service: Not on file     Active member of club or organization: Not on file     Attends meetings of clubs or organizations: Not on file     Relationship status: Not on file    Intimate partner violence:     Fear of current or ex partner: Not on file     Emotionally abused: Not on file     Physically abused: Not on file     Forced sexual activity: Not on file   Other Topics Concern    Not on file   Social History Narrative    Not on file       Allergies   Allergen Reactions    Celecoxib GI Intolerance    Other Rash     Adhesive tape    Chocolate     Cilostazol Edema     rash    Wound Dressing Adhesive      Other reaction(s): Itching         Current Outpatient Medications:     aspirin 81 MG tablet, Take 81 mg by mouth daily, Disp: , Rfl:     Calcium Citrate-Vitamin D (CALCIUM + D PO), Take 2 tablets by mouth daily, Disp: , Rfl:     Multiple Vitamin (MULTIVITAMIN) tablet, Take 1 tablet by mouth daily, Disp: , Rfl:     Naproxen Sodium (ALEVE) 220 MG CAPS, Take by mouth, Disp: , Rfl:     simvastatin (ZOCOR) 40 mg tablet, Take 1 tablet (40 mg total) by mouth daily at bedtime, Disp: 90 tablet, Rfl: 3

## 2019-08-15 NOTE — TELEPHONE ENCOUNTER
Spoke with pt to schedule agram on 8-26-19 at [address] with Dr Smalls  Pt will go for labs to [facility] on Thursday,8-22-19  Pt was told NPO from midnight , hospital will call with arrival time, no hold on ASA   SJ

## 2019-08-19 ENCOUNTER — APPOINTMENT (OUTPATIENT)
Dept: LAB | Facility: HOSPITAL | Age: 79
End: 2019-08-19
Attending: SURGERY
Payer: COMMERCIAL

## 2019-08-19 DIAGNOSIS — I70.211 ATHEROSCLEROSIS OF NATIVE ARTERY OF RIGHT LOWER EXTREMITY WITH INTERMITTENT CLAUDICATION (HCC): ICD-10-CM

## 2019-08-19 LAB
ANION GAP SERPL CALCULATED.3IONS-SCNC: 9 MMOL/L (ref 4–13)
BUN SERPL-MCNC: 16 MG/DL (ref 5–25)
CALCIUM SERPL-MCNC: 9.7 MG/DL (ref 8.3–10.1)
CHLORIDE SERPL-SCNC: 103 MMOL/L (ref 100–108)
CO2 SERPL-SCNC: 26 MMOL/L (ref 21–32)
CREAT SERPL-MCNC: 0.85 MG/DL (ref 0.6–1.3)
ERYTHROCYTE [DISTWIDTH] IN BLOOD BY AUTOMATED COUNT: 13.2 % (ref 11.6–15.1)
GFR SERPL CREATININE-BSD FRML MDRD: 65 ML/MIN/1.73SQ M
GLUCOSE P FAST SERPL-MCNC: 90 MG/DL (ref 65–99)
HCT VFR BLD AUTO: 42.1 % (ref 34.8–46.1)
HGB BLD-MCNC: 13.7 G/DL (ref 11.5–15.4)
MCH RBC QN AUTO: 34.7 PG (ref 26.8–34.3)
MCHC RBC AUTO-ENTMCNC: 32.5 G/DL (ref 31.4–37.4)
MCV RBC AUTO: 107 FL (ref 82–98)
PLATELET # BLD AUTO: 239 THOUSANDS/UL (ref 149–390)
PMV BLD AUTO: 10.1 FL (ref 8.9–12.7)
POTASSIUM SERPL-SCNC: 4.2 MMOL/L (ref 3.5–5.3)
RBC # BLD AUTO: 3.95 MILLION/UL (ref 3.81–5.12)
SODIUM SERPL-SCNC: 138 MMOL/L (ref 136–145)
WBC # BLD AUTO: 8.95 THOUSAND/UL (ref 4.31–10.16)

## 2019-08-19 PROCEDURE — 85027 COMPLETE CBC AUTOMATED: CPT

## 2019-08-19 PROCEDURE — 80048 BASIC METABOLIC PNL TOTAL CA: CPT

## 2019-08-19 PROCEDURE — 36415 COLL VENOUS BLD VENIPUNCTURE: CPT

## 2019-08-21 ENCOUNTER — TELEPHONE (OUTPATIENT)
Dept: RADIOLOGY | Facility: HOSPITAL | Age: 79
End: 2019-08-21

## 2019-08-21 RX ORDER — SODIUM CHLORIDE 9 MG/ML
75 INJECTION, SOLUTION INTRAVENOUS CONTINUOUS
Status: CANCELLED | OUTPATIENT
Start: 2019-08-21

## 2019-08-23 ENCOUNTER — TELEPHONE (OUTPATIENT)
Dept: SURGERY | Facility: HOSPITAL | Age: 79
End: 2019-08-23

## 2019-08-26 ENCOUNTER — HOSPITAL ENCOUNTER (OUTPATIENT)
Dept: RADIOLOGY | Facility: HOSPITAL | Age: 79
Discharge: HOME | End: 2019-08-26
Attending: SURGERY | Admitting: SURGERY
Payer: COMMERCIAL

## 2019-08-26 VITALS
WEIGHT: 149 LBS | DIASTOLIC BLOOD PRESSURE: 67 MMHG | BODY MASS INDEX: 28.13 KG/M2 | SYSTOLIC BLOOD PRESSURE: 152 MMHG | HEART RATE: 77 BPM | HEIGHT: 61 IN | TEMPERATURE: 97.6 F | RESPIRATION RATE: 18 BRPM | OXYGEN SATURATION: 99 %

## 2019-08-26 DIAGNOSIS — I70.211 ATHEROSCLEROSIS OF NATIVE ARTERIES OF EXTREMITIES WITH INTERMITTENT CLAUDICATION, RIGHT LEG (HCC): ICD-10-CM

## 2019-08-26 PROCEDURE — C1894 INTRO/SHEATH, NON-LASER: HCPCS

## 2019-08-26 PROCEDURE — 37221 HB ILIAC REVASC W/STENT: CPT

## 2019-08-26 PROCEDURE — C1725 CATH, TRANSLUMIN NON-LASER: HCPCS

## 2019-08-26 PROCEDURE — C1769 GUIDE WIRE: HCPCS

## 2019-08-26 PROCEDURE — 99153 MOD SED SAME PHYS/QHP EA: CPT

## 2019-08-26 PROCEDURE — 75625 CONTRAST EXAM ABDOMINL AORTA: CPT

## 2019-08-26 PROCEDURE — 75710 ARTERY X-RAYS ARM/LEG: CPT

## 2019-08-26 PROCEDURE — C1760 CLOSURE DEV, VASC: HCPCS

## 2019-08-26 PROCEDURE — 99152 MOD SED SAME PHYS/QHP 5/>YRS: CPT

## 2019-08-26 PROCEDURE — 99152 MOD SED SAME PHYS/QHP 5/>YRS: CPT | Performed by: SURGERY

## 2019-08-26 PROCEDURE — 76937 US GUIDE VASCULAR ACCESS: CPT

## 2019-08-26 PROCEDURE — C1876 STENT, NON-COA/NON-COV W/DEL: HCPCS

## 2019-08-26 PROCEDURE — 37221 PR REVASCULARIZE ILIAC ARTERY,ANGIOPLASTY/STENT, INITIAL VESSEL: CPT | Performed by: SURGERY

## 2019-08-26 RX ORDER — CLOPIDOGREL BISULFATE 75 MG/1
75 TABLET ORAL DAILY
Status: DISCONTINUED | OUTPATIENT
Start: 2019-08-26 | End: 2019-08-27 | Stop reason: HOSPADM

## 2019-08-26 RX ORDER — MIDAZOLAM HYDROCHLORIDE 1 MG/ML
INJECTION INTRAMUSCULAR; INTRAVENOUS CODE/TRAUMA/SEDATION MEDICATION
Status: DISCONTINUED | OUTPATIENT
Start: 2019-08-26 | End: 2019-08-27 | Stop reason: HOSPADM

## 2019-08-26 RX ORDER — SODIUM CHLORIDE 9 MG/ML
75 INJECTION, SOLUTION INTRAVENOUS CONTINUOUS
Status: DISCONTINUED | OUTPATIENT
Start: 2019-08-26 | End: 2019-08-27 | Stop reason: HOSPADM

## 2019-08-26 RX ORDER — FENTANYL CITRATE 50 UG/ML
INJECTION, SOLUTION INTRAMUSCULAR; INTRAVENOUS CODE/TRAUMA/SEDATION MEDICATION
Status: DISCONTINUED | OUTPATIENT
Start: 2019-08-26 | End: 2019-08-27 | Stop reason: HOSPADM

## 2019-08-26 RX ORDER — OXYCODONE HYDROCHLORIDE AND ACETAMINOPHEN 5; 325 MG/1; MG/1
1 TABLET ORAL EVERY 4 HOURS PRN
Status: DISCONTINUED | OUTPATIENT
Start: 2019-08-26 | End: 2019-08-27 | Stop reason: HOSPADM

## 2019-08-26 RX ORDER — CLOPIDOGREL BISULFATE 75 MG/1
75 TABLET ORAL DAILY
Qty: 90 TABLET | Refills: 3 | Status: SHIPPED | OUTPATIENT
Start: 2019-08-26 | End: 2020-06-29

## 2019-08-26 RX ORDER — HEPARIN SODIUM 1000 [USP'U]/ML
INJECTION, SOLUTION INTRAVENOUS; SUBCUTANEOUS CODE/TRAUMA/SEDATION MEDICATION
Status: DISCONTINUED | OUTPATIENT
Start: 2019-08-26 | End: 2019-08-27 | Stop reason: HOSPADM

## 2019-08-26 RX ORDER — SODIUM CHLORIDE, SODIUM LACTATE, POTASSIUM CHLORIDE, CALCIUM CHLORIDE 600; 310; 30; 20 MG/100ML; MG/100ML; MG/100ML; MG/100ML
125 INJECTION, SOLUTION INTRAVENOUS CONTINUOUS
Status: DISPENSED | OUTPATIENT
Start: 2019-08-26 | End: 2019-08-26

## 2019-08-26 RX ORDER — OXYCODONE HYDROCHLORIDE AND ACETAMINOPHEN 5; 325 MG/1; MG/1
1 TABLET ORAL EVERY 4 HOURS PRN
Qty: 16 TABLET | Refills: 0 | Status: SHIPPED | OUTPATIENT
Start: 2019-08-26 | End: 2019-09-05

## 2019-08-26 RX ADMIN — FENTANYL CITRATE 25 MCG: 50 INJECTION INTRAMUSCULAR; INTRAVENOUS at 11:40

## 2019-08-26 RX ADMIN — FENTANYL CITRATE 50 MCG: 50 INJECTION INTRAMUSCULAR; INTRAVENOUS at 10:54

## 2019-08-26 RX ADMIN — MIDAZOLAM 1 MG: 1 INJECTION INTRAMUSCULAR; INTRAVENOUS at 11:35

## 2019-08-26 RX ADMIN — FENTANYL CITRATE 50 MCG: 50 INJECTION INTRAMUSCULAR; INTRAVENOUS at 11:04

## 2019-08-26 RX ADMIN — MIDAZOLAM 1 MG: 1 INJECTION INTRAMUSCULAR; INTRAVENOUS at 11:04

## 2019-08-26 RX ADMIN — FENTANYL CITRATE 50 MCG: 50 INJECTION INTRAMUSCULAR; INTRAVENOUS at 11:35

## 2019-08-26 RX ADMIN — CLOPIDOGREL BISULFATE 75 MG: 75 TABLET ORAL at 13:38

## 2019-08-26 RX ADMIN — HEPARIN SODIUM 5000 UNITS: 1000 INJECTION INTRAVENOUS; SUBCUTANEOUS at 11:20

## 2019-08-26 RX ADMIN — MIDAZOLAM 1 MG: 1 INJECTION INTRAMUSCULAR; INTRAVENOUS at 11:18

## 2019-08-26 RX ADMIN — IODIXANOL 10 ML: 320 INJECTION, SOLUTION INTRAVASCULAR at 11:58

## 2019-08-26 RX ADMIN — FENTANYL CITRATE 25 MCG: 50 INJECTION INTRAMUSCULAR; INTRAVENOUS at 11:18

## 2019-08-26 RX ADMIN — SODIUM CHLORIDE 75 ML/HR: 0.9 INJECTION, SOLUTION INTRAVENOUS at 09:13

## 2019-08-26 RX ADMIN — MIDAZOLAM 1 MG: 1 INJECTION INTRAMUSCULAR; INTRAVENOUS at 10:54

## 2019-08-26 NOTE — DISCHARGE INSTRUCTIONS
ARTERIOGRAM    WHAT YOU SHOULD KNOW:   An angiogram is a procedure to look at arteries in your body  Arteries are the blood vessels that carry blood from your heart to your body  AFTER YOU LEAVE:     Self-care:   · Limit activity: Rest for the remainder of the day of your procedure  Have some one with you until the next morning  Keep your arm or leg straight as much as possible  Rest as much as possible, sitting lying or reclining  Walk only to go to the bathroom, to bed or to eat  If the angiogram catheter was put in your leg, use the stairs as little as possible  No driving  · Keep your wound clean and dry  You may shower 24 hours after your procedure  The bandage you have on should fall off in 2-3 days  If there is any drainage from the puncture site, you should put on a clean bandage  · Watch for bleeding and bruising: It is normal to have a bruise and soreness where the angiogram catheter went in  · Diet:   · You may resume your regular diet, Sips of flat soda will help with mild nausea  · Drink more liquids than usual for the next 24 hours      · IMMEDIATELY Contact Interventional Radiology at [phone] Shahrzad PATIENTS: Contact Interventional Radiology at [phone]) Cynthia Hoff PATIENTS: Contact Interventional Radiology at [phone]) if any of the following occur:  · If your bruise gets larger or if you notice any active bleeding  APPLY DIRECT PRESSURE TO THE BLEEDING SITE  · If you notice increased swelling or have increased pain at the puncture site   · If you have any numbness or pain in the extremity of the puncture site   · If that extremity seems cold or pale      · You have fever greater than 101  · Persistent nausea or vomitting    Follow up with your primary healthcare provider  as directed: Write down your questions so you remember to ask them during your visits

## 2019-08-27 ENCOUNTER — TELEPHONE (OUTPATIENT)
Dept: VASCULAR SURGERY | Facility: CLINIC | Age: 79
End: 2019-08-27

## 2019-08-27 ENCOUNTER — TELEPHONE (OUTPATIENT)
Dept: FAMILY MEDICINE CLINIC | Facility: CLINIC | Age: 79
End: 2019-08-27

## 2019-08-27 NOTE — TELEPHONE ENCOUNTER
Took call from patient requesting instruction on post op care from yesterday's procedure with Dr Lis Oliva  I advised patient to refer her questions to Dr Callie Dominguez office, she states she is unable to get a hold of anyone in the office  Call placed to Dr Callie Dominguez office, spoke to Sepideh Ayala who stated she will ask one of the nurses to contact patient

## 2019-08-27 NOTE — TELEPHONE ENCOUNTER
Spoke with patient who had questions about her dressing and showering  Reviewed instructions on AVS with patient over the phone  Patient confirms she does have the instructions but is calling to be 100% sure

## 2019-09-12 ENCOUNTER — OFFICE VISIT (OUTPATIENT)
Dept: VASCULAR SURGERY | Facility: CLINIC | Age: 79
End: 2019-09-12
Payer: COMMERCIAL

## 2019-09-12 VITALS
WEIGHT: 151 LBS | HEART RATE: 74 BPM | TEMPERATURE: 98.3 F | DIASTOLIC BLOOD PRESSURE: 72 MMHG | BODY MASS INDEX: 28.51 KG/M2 | RESPIRATION RATE: 20 BRPM | SYSTOLIC BLOOD PRESSURE: 144 MMHG | HEIGHT: 61 IN

## 2019-09-12 DIAGNOSIS — I70.218 ATHEROSCLEROSIS OF NATIVE ARTERY OF OTHER EXTREMITY WITH INTERMITTENT CLAUDICATION (HCC): Primary | ICD-10-CM

## 2019-09-12 PROCEDURE — 99213 OFFICE O/P EST LOW 20 MIN: CPT | Performed by: SURGERY

## 2019-09-12 NOTE — LETTER
September 12, 2019     [facility], [address] [facility],[address]  [address] [place] [address]    Patient: Sarah Henry   YOB: 1940   Date of Visit: 9/12/2019       Dear Dr Guanako Birmingham: Thank you for referring Marcella Fernandez to me for evaluation  Below are my notes for this consultation  If you have questions, please do not hesitate to call me  I look forward to following your patient along with you           Sincerely,        Madeleine Barreto MD        CC: No Recipients

## 2019-09-12 NOTE — PATIENT INSTRUCTIONS
Doing well status post right common and external iliac stent  She is ambulating mostly without claudication symptoms which is greatly improved over her pre intervention state  Maintaining her on her pre procedure medications      Plan:  Follow-up lower extremity and aortoiliac Doppler in 6 months

## 2019-09-12 NOTE — PROGRESS NOTES
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)  Doing well status post right common and external iliac stent  She is ambulating mostly without claudication symptoms which is greatly improved over her pre intervention state  Maintaining her on her pre procedure medications  Plan:  Follow-up lower extremity and aortoiliac Doppler in 6 months       Diagnoses and all orders for this visit:    Atherosclerosis of native artery of other extremity with intermittent claudication (HCC)        Subjective:      Patient ID: Gilson Bob is a 78 y o  female  HPI history of right leg claudication calf and thigh which short distances which is greatly improved since iliac artery stenting was performed  No ischemic rest pain or tissue    The following portions of the patient's history were reviewed and updated as appropriate: allergies, current medications, past family history, past medical history, past social history, past surgical history and problem list     Review of Systems  claudication, right iliac artery stenting, no GI or  symptoms no constitutional symptoms no skin rash all other systems negative    Objective:      /72 (BP Location: Right arm, Patient Position: Sitting)   Pulse 74   Temp 98 3 °F (36 8 °C)   Resp 20   Ht 5' 1" (1 549 m)   Wt 68 5 kg (151 lb)   BMI 28 53 kg/m²          Physical Exam      Easily palpable right femoral popliteal dorsalis pedis pulses  I have reviewed and made appropriate changes to the review of systems input by the medical assistant      Vitals:    09/12/19 1415   BP: 144/72   BP Location: Right arm   Patient Position: Sitting   Pulse: 74   Resp: 20   Temp: 98 3 °F (36 8 °C)   Weight: 68 5 kg (151 lb)   Height: 5' 1" (1 549 m)       Patient Active Problem List   Diagnosis    Smoking    Peripheral arterial occlusive disease (HCC)    Proximal humerus fracture    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)    Chronic obstructive pulmonary disease (HCC)    Degeneration of intervertebral disc    Hyperlipidemia    Iliac artery stenosis, bilateral (HCC)    Carotid stenosis, bilateral    Osteoarthritis    Osteopenia    Varicose veins of right lower extremity with pain    Vitamin D deficiency       Past Surgical History:   Procedure Laterality Date    APPENDECTOMY      BREAST SURGERY      gilberto    COLONOSCOPY      HEMORRHOID SURGERY      IR ABDOMINAL ANGIOGRAPHY / INTERVENTION  8/26/2019    UT RECONSTR TOTAL SHOULDER IMPLANT Right 12/8/2016    Procedure: ARTHROPLASTY SHOULDER REVERSE;  Surgeon: Solange Michael MD;  Location: [facility];  Service: Orthopedics    THROMBOENDARTERECTOMY      TONSILLECTOMY         History reviewed  No pertinent family history  Social History     Socioeconomic History    Marital status:      Spouse name: Not on file    Number of children: Not on file    Years of education: Not on file    Highest education level: Not on file   Occupational History    Not on file   Social Needs    Financial resource strain: Not on file    Food insecurity:     Worry: Not on file     Inability: Not on file    Transportation needs:     Medical: Not on file     Non-medical: Not on file   Tobacco Use    Smoking status: Heavy Tobacco Smoker     Packs/day: 0 50     Years: 64 00     Pack years: 32 00     Types: Cigarettes    Smokeless tobacco: Never Used   Substance and Sexual Activity    Alcohol use:  Yes     Alcohol/week: 14 0 standard drinks     Types: 14 Glasses of wine per week     Comment: 2 glasses of wine each day    Drug use: No    Sexual activity: Not on file   Lifestyle    Physical activity:     Days per week: Not on file     Minutes per session: Not on file    Stress: Not on file   Relationships    Social connections:     Talks on phone: Not on file     Gets together: Not on file     Attends Congregational service: Not on file     Active member of club or organization: Not on file     Attends meetings of clubs or organizations: Not on file     Relationship status: Not on file    Intimate partner violence:     Fear of current or ex partner: Not on file     Emotionally abused: Not on file     Physically abused: Not on file     Forced sexual activity: Not on file   Other Topics Concern    Not on file   Social History Narrative    Not on file       Allergies   Allergen Reactions    Celecoxib GI Intolerance    Other Rash     Adhesive tape    Chocolate     Cilostazol Edema     rash    Wound Dressing Adhesive      Other reaction(s): Itching         Current Outpatient Medications:     aspirin 81 MG tablet, Take 81 mg by mouth daily, Disp: , Rfl:     clopidogrel (PLAVIX) 75 mg tablet, Take 1 tablet (75 mg total) by mouth daily, Disp: 90 tablet, Rfl: 3    Multiple Vitamin (MULTIVITAMIN) tablet, Take 1 tablet by mouth daily, Disp: , Rfl:     Naproxen Sodium (ALEVE) 220 MG CAPS, Take by mouth, Disp: , Rfl:     simvastatin (ZOCOR) 40 mg tablet, Take 1 tablet (40 mg total) by mouth daily at bedtime, Disp: 90 tablet, Rfl: 3    Calcium Citrate-Vitamin D (CALCIUM + D PO), Take 2 tablets by mouth daily, Disp: , Rfl:

## 2019-09-26 ENCOUNTER — TELEPHONE (OUTPATIENT)
Dept: VASCULAR SURGERY | Facility: CLINIC | Age: 79
End: 2019-09-26

## 2019-09-26 NOTE — TELEPHONE ENCOUNTER
Patient was seen by Dr Milla Castro on 9/12/19 and was placed on Plavix in addition to baby ASA  Patient called office today to report she has ecchymotic areas all over legs, arms, and even on trunk  She denies bumping herself and is concerned about remaining on Plavix  Advised patient will call her back after addressing with triage provider

## 2019-09-26 NOTE — TELEPHONE ENCOUNTER
Ideally she should continue Plavix + ASA for minimum of 60 days post stent placement  After that Plavix can be discontinued and remain on ASA alone

## 2019-10-29 ENCOUNTER — OFFICE VISIT (OUTPATIENT)
Dept: FAMILY MEDICINE CLINIC | Facility: CLINIC | Age: 79
End: 2019-10-29
Payer: COMMERCIAL

## 2019-10-29 VITALS
HEART RATE: 64 BPM | BODY MASS INDEX: 29.07 KG/M2 | SYSTOLIC BLOOD PRESSURE: 132 MMHG | WEIGHT: 154 LBS | TEMPERATURE: 98.6 F | RESPIRATION RATE: 16 BRPM | DIASTOLIC BLOOD PRESSURE: 74 MMHG | HEIGHT: 61 IN

## 2019-10-29 DIAGNOSIS — Z00.00 MEDICARE ANNUAL WELLNESS VISIT, SUBSEQUENT: ICD-10-CM

## 2019-10-29 DIAGNOSIS — I77.9 PERIPHERAL ARTERIAL OCCLUSIVE DISEASE (HCC): ICD-10-CM

## 2019-10-29 DIAGNOSIS — E78.5 HYPERLIPIDEMIA, UNSPECIFIED HYPERLIPIDEMIA TYPE: Primary | ICD-10-CM

## 2019-10-29 DIAGNOSIS — E55.9 VITAMIN D DEFICIENCY: ICD-10-CM

## 2019-10-29 DIAGNOSIS — M15.9 PRIMARY OSTEOARTHRITIS INVOLVING MULTIPLE JOINTS: ICD-10-CM

## 2019-10-29 DIAGNOSIS — F17.200 SMOKING: ICD-10-CM

## 2019-10-29 DIAGNOSIS — R82.90 NONSPECIFIC FINDING ON EXAMINATION OF URINE: ICD-10-CM

## 2019-10-29 DIAGNOSIS — J44.9 CHRONIC OBSTRUCTIVE PULMONARY DISEASE, UNSPECIFIED COPD TYPE (HCC): ICD-10-CM

## 2019-10-29 DIAGNOSIS — Z23 FLU VACCINE NEED: ICD-10-CM

## 2019-10-29 PROCEDURE — 99214 OFFICE O/P EST MOD 30 MIN: CPT | Performed by: FAMILY MEDICINE

## 2019-10-29 PROCEDURE — G0439 PPPS, SUBSEQ VISIT: HCPCS | Performed by: FAMILY MEDICINE

## 2019-10-29 PROCEDURE — 90662 IIV NO PRSV INCREASED AG IM: CPT

## 2019-10-29 PROCEDURE — 36415 COLL VENOUS BLD VENIPUNCTURE: CPT | Performed by: FAMILY MEDICINE

## 2019-10-29 PROCEDURE — 1170F FXNL STATUS ASSESSED: CPT

## 2019-10-29 PROCEDURE — 1125F AMNT PAIN NOTED PAIN PRSNT: CPT

## 2019-10-29 PROCEDURE — G0008 ADMIN INFLUENZA VIRUS VAC: HCPCS

## 2019-10-29 NOTE — PATIENT INSTRUCTIONS
Medicare Preventive Visit Patient Instructions  Thank you for completing your Welcome to Medicare Visit or Medicare Annual Wellness Visit today  Your next wellness visit will be due in one year (10/29/2020)  The screening/preventive services that you may require over the next 5-10 years are detailed below  Some tests may not apply to you based off risk factors and/or age  Screening tests ordered at today's visit but not completed yet may show as past due  Also, please note that scanned in results may not display below  Preventive Screenings:  Service Recommendations Previous Testing/Comments   Colorectal Cancer Screening  * Colonoscopy    * Fecal Occult Blood Test (FOBT)/Fecal Immunochemical Test (FIT)  * Fecal DNA/Cologuard Test  * Flexible Sigmoidoscopy Age: 54-65 years old   Colonoscopy: every 10 years (may be performed more frequently if at higher risk)  OR  FOBT/FIT: every 1 year  OR  Cologuard: every 3 years  OR  Sigmoidoscopy: every 5 years  Screening may be recommended earlier than age 48 if at higher risk for colorectal cancer  Also, an individualized decision between you and your healthcare provider will decide whether screening between the ages of 74-80 would be appropriate  Colonoscopy: Not on file  FOBT/FIT: Not on file  Cologuard: Not on file  Sigmoidoscopy: Not on file    Screening Current     Breast Cancer Screening Age: 36 years old  Frequency: every 1-2 years  Not required if history of left and right mastectomy Mammogram: 07/08/2014       Cervical Cancer Screening Between the ages of 21-29, pap smear recommended once every 3 years  Between the ages of 33-67, can perform pap smear with HPV co-testing every 5 years     Recommendations may differ for women with a history of total hysterectomy, cervical cancer, or abnormal pap smears in past  Pap Smear: Not on file    Screening Not Indicated   Hepatitis C Screening Once for adults born between 1945 and 1965  More frequently in patients at high risk for Hepatitis C Hep C Antibody: Not on file       Diabetes Screening 1-2 times per year if you're at risk for diabetes or have pre-diabetes Fasting glucose: 90 mg/dL   A1C: No results in last 5 years    Screening Current   Cholesterol Screening Once every 5 years if you don't have a lipid disorder  May order more often based on risk factors  Lipid panel: 12/13/2018    Screening Not Indicated  History Lipid Disorder     Other Preventive Screenings Covered by Medicare:  1  Abdominal Aortic Aneurysm (AAA) Screening: covered once if your at risk  You're considered to be at risk if you have a family history of AAA  2  Lung Cancer Screening: covers low dose CT scan once per year if you meet all of the following conditions: (1) Age 50-69; (2) No signs or symptoms of lung cancer; (3) Current smoker or have quit smoking within the last 15 years; (4) You have a tobacco smoking history of at least 30 pack years (packs per day multiplied by number of years you smoked); (5) You get a written order from a healthcare provider  3  Glaucoma Screening: covered annually if you're considered high risk: (1) You have diabetes OR (2) Family history of glaucoma OR (3)  aged 48 and older OR (3)  American aged 72 and older  3  Osteoporosis Screening: covered every 2 years if you meet one of the following conditions: (1) You're estrogen deficient and at risk for osteoporosis based off medical history and other findings; (2) Have a vertebral abnormality; (3) On glucocorticoid therapy for more than 3 months; (4) Have primary hyperparathyroidism; (5) On osteoporosis medications and need to assess response to drug therapy  · Last bone density test (DXA Scan): 04/23/2013  5  HIV Screening: covered annually if you're between the age of 12-76  Also covered annually if you are younger than 13 and older than 72 with risk factors for HIV infection   For pregnant patients, it is covered up to 3 times per pregnancy  Immunizations:  Immunization Recommendations   Influenza Vaccine Annual influenza vaccination during flu season is recommended for all persons aged >= 6 months who do not have contraindications   Pneumococcal Vaccine (Prevnar and Pneumovax)  * Prevnar = PCV13  * Pneumovax = PPSV23   Adults 25-60 years old: 1-3 doses may be recommended based on certain risk factors  Adults 72 years old: Prevnar (PCV13) vaccine recommended followed by Pneumovax (PPSV23) vaccine  If already received PPSV23 since turning 65, then PCV13 recommended at least one year after PPSV23 dose  Hepatitis B Vaccine 3 dose series if at intermediate or high risk (ex: diabetes, end stage renal disease, liver disease)   Tetanus (Td) Vaccine - COST NOT COVERED BY MEDICARE PART B Following completion of primary series, a booster dose should be given every 10 years to maintain immunity against tetanus  Td may also be given as tetanus wound prophylaxis  Tdap Vaccine - COST NOT COVERED BY MEDICARE PART B Recommended at least once for all adults  For pregnant patients, recommended with each pregnancy  Shingles Vaccine (Shingrix) - COST NOT COVERED BY MEDICARE PART B  2 shot series recommended in those aged 48 and above     Health Maintenance Due:      Topic Date Due    CRC Screening: Colonoscopy  05/01/2020 (Originally 1940)     Immunizations Due:      Topic Date Due    DTaP,Tdap,and Td Vaccines (1 - Tdap) 03/[birth year]/1961    INFLUENZA VACCINE  07/01/2019     Advance Directives   What are advance directives? Advance directives are legal documents that state your wishes and plans for medical care  These plans are made ahead of time in case you lose your ability to make decisions for yourself  Advance directives can apply to any medical decision, such as the treatments you want, and if you want to donate organs  What are the types of advance directives?   There are many types of advance directives, and each state has rules about how to use them  You may choose a combination of any of the following:  · Living will: This is a written record of the treatment you want  You can also choose which treatments you do not want, which to limit, and which to stop at a certain time  This includes surgery, medicine, IV fluid, and tube feedings  · Durable power of  for healthcare [facility]): This is a written record that states who you want to make healthcare choices for you when you are unable to make them for yourself  This person, called a proxy, is usually a family member or a friend  You may choose more than 1 proxy  · Do not resuscitate (DNR) order:  A DNR order is used in case your heart stops beating or you stop breathing  It is a request not to have certain forms of treatment, such as CPR  A DNR order may be included in other types of advance directives  · Medical directive: This covers the care that you want if you are in a coma, near death, or unable to make decisions for yourself  You can list the treatments you want for each condition  Treatment may include pain medicine, surgery, blood transfusions, dialysis, IV or tube feedings, and a ventilator (breathing machine)  · Values history: This document has questions about your views, beliefs, and how you feel and think about life  This information can help others choose the care that you would choose  Why are advance directives important? An advance directive helps you control your care  Although spoken wishes may be used, it is better to have your wishes written down  Spoken wishes can be misunderstood, or not followed  Treatments may be given even if you do not want them  An advance directive may make it easier for your family to make difficult choices about your care  Cigarette Smoking and Your Health   Risks to your health if you smoke:  Nicotine and other chemicals found in tobacco damage every cell in your body   Even if you are a light smoker, you have an increased risk for cancer, heart disease, and lung disease  If you are pregnant or have diabetes, smoking increases your risk for complications  Benefits to your health if you stop smoking:   · You decrease respiratory symptoms such as coughing, wheezing, and shortness of breath  · You reduce your risk for cancers of the lung, mouth, throat, kidney, bladder, pancreas, stomach, and cervix  If you already have cancer, you increase the benefits of chemotherapy  You also reduce your risk for cancer returning or a second cancer from developing  · You reduce your risk for heart disease, blood clots, heart attack, and stroke  · You reduce your risk for lung infections, and diseases such as pneumonia, asthma, chronic bronchitis, and emphysema  · Your circulation improves  More oxygen can be delivered to your body  If you have diabetes, you lower your risk for complications, such as kidney, artery, and eye diseases  You also lower your risk for nerve damage  Nerve damage can lead to amputations, poor vision, and blindness  · You improve your body's ability to heal and to fight infections  For more information and support to stop smoking:   · [employer]  Phone: 5- 383 - 860-0077  Web Address: www [employer]  Weight Management   Why it is important to manage your weight:  Being overweight increases your risk of health conditions such as heart disease, high blood pressure, type 2 diabetes, and certain types of cancer  It can also increase your risk for osteoarthritis, sleep apnea, and other respiratory problems  Aim for a slow, steady weight loss  Even a small amount of weight loss can lower your risk of health problems  How to lose weight safely:  A safe and healthy way to lose weight is to eat fewer calories and get regular exercise  You can lose up about 1 pound a week by decreasing the number of calories you eat by 500 calories each day     Healthy meal plan for weight management:  A healthy meal plan includes a variety of foods, contains fewer calories, and helps you stay healthy  A healthy meal plan includes the following:  · Eat whole-grain foods more often  A healthy meal plan should contain fiber  Fiber is the part of grains, fruits, and vegetables that is not broken down by your body  Whole-grain foods are healthy and provide extra fiber in your diet  Some examples of whole-grain foods are whole-wheat breads and pastas, oatmeal, brown rice, and bulgur  · Eat a variety of vegetables every day  Include dark, leafy greens such as spinach, kale, malcolm greens, and mustard greens  Eat yellow and orange vegetables such as carrots, sweet potatoes, and winter squash  · Eat a variety of fruits every day  Choose fresh or canned fruit (canned in its own juice or light syrup) instead of juice  Fruit juice has very little or no fiber  · Eat low-fat dairy foods  Drink fat-free (skim) milk or 1% milk  Eat fat-free yogurt and low-fat cottage cheese  Try low-fat cheeses such as mozzarella and other reduced-fat cheeses  · Choose meat and other protein foods that are low in fat  Choose beans or other legumes such as split peas or lentils  Choose fish, skinless poultry (chicken or turkey), or lean cuts of red meat (beef or pork)  Before you cook meat or poultry, cut off any visible fat  · Use less fat and oil  Try baking foods instead of frying them  Add less fat, such as margarine, sour cream, regular salad dressing and mayonnaise to foods  Eat fewer high-fat foods  Some examples of high-fat foods include french fries, doughnuts, ice cream, and cakes  · Eat fewer sweets  Limit foods and drinks that are high in sugar  This includes candy, cookies, regular soda, and sweetened drinks  Exercise:  Exercise at least 30 minutes per day on most days of the week  Some examples of exercise include walking, biking, dancing, and swimming   You can also fit in more physical activity by taking the stairs instead of the elevator or parking farther away from stores  Ask your healthcare provider about the best exercise plan for you  © Copyright [employer] 2018 Information is for End User's use only and may not be sold, redistributed or otherwise used for commercial purposes   All illustrations and images included in CareNotes® are the copyrighted property of [employer]  or [address] [place] [address]

## 2019-10-29 NOTE — PROGRESS NOTES
Assessment and Plan:     Problem List Items Addressed This Visit     None        BMI Counseling: Body mass index is 29 1 kg/m²  The BMI is above normal  Nutrition recommendations include decreasing portion sizes, encouraging healthy choices of fruits and vegetables, decreasing fast food intake, consuming healthier snacks, limiting drinks that contain sugar, moderation in carbohydrate intake and reducing intake of saturated and trans fat  Exercise recommendations include moderate physical activity 150 minutes/week  No pharmacotherapy was ordered  Preventive health issues were discussed with patient, and age appropriate screening tests were ordered as noted in patient's After Visit Summary  Personalized health advice and appropriate referrals for health education or preventive services given if needed, as noted in patient's After Visit Summary       History of Present Illness:     Patient presents for Medicare Annual Wellness visit    Patient Care Team:  Kimberley Jones DO as PCP - DO Sissy Wilcox MD Christie Lat, DO Lilla Julian, MD     Problem List:     Patient Active Problem List   Diagnosis    Smoking    [facility]ipheral [facility] oc[facility]    Proximal humerus fracture    Atherosclerosis of native artery of extremity with intermittent claudication ([facility] [facility]    Chronic obstructive pulmonary disease [facility] [facility]    Degeneration of intervertebral disc    Hyperlipidemia    Iliac artery stenosis, bilateral ([address]    Carotid stenosis, bilateral    Osteoarthritis    Osteopenia    Varicose veins of right lower extremity with pain    Vitamin D deficiency      Past Medical and Surgical History:     Past Medical History:   Diagnosis Date    Food allergy     chocholate    Hyperlipidemia     Peripheral arterial occlusive disease [facility] [address] [facility]     Right humeral fracture     right poximal    Smoking     Wears dentures     full upper    Wears glasses      Past Surgical History: Procedure Laterality Date    APPENDECTOMY      BREAST SURGERY      gilberto    COLONOSCOPY      HEMORRHOID SURGERY      IR ABDOMINAL ANGIOGRAPHY / INTERVENTION  8/26/2019    DE RECONSTR TOTAL SHOULDER IMPLANT Right 12/8/2016    Procedure: ARTHROPLASTY SHOULDER REVERSE;  Surgeon: Ni Hidalgo MD;  Location: [facility];  Service: Orthopedics    THROMBOENDARTERECTOMY      TONSILLECTOMY        Family History:     No family history on file  Social History:     Social History     Socioeconomic History    Marital status:      Spouse name: None    Number of children: None    Years of education: None    Highest education level: None   Occupational History    None   Social Needs    Financial resource strain: None    Food insecurity:     Worry: None     Inability: None    Transportation needs:     Medical: None     Non-medical: None   Tobacco Use    Smoking status: Heavy Tobacco Smoker     Packs/day: 0 50     Years: 64 00     Pack years: 32 00     Types: Cigarettes    Smokeless tobacco: Never Used   Substance and Sexual Activity    Alcohol use:  Yes     Alcohol/week: 14 0 standard drinks     Types: 14 Glasses of wine per week     Comment: 2 glasses of wine each day    Drug use: No    Sexual activity: None   Lifestyle    Physical activity:     Days per week: None     Minutes per session: None    Stress: None   Relationships    Social connections:     Talks on phone: None     Gets together: None     Attends L[employer] service: None     Active member of club or organization: None     Attends meetings of clubs or organizations: None     Relationship status: None    Intimate partner violence:     Fear of current or ex partner: None     Emotionally abused: None     Physically abused: None     Forced sexual activity: None   Other Topics Concern    None   Social History Narrative    None       Medications and Allergies:     Current Outpatient Medications   Medication Sig Dispense Refill    aspirin 81 MG tablet Take 81 mg by mouth daily      Calcium Citrate-Vitamin D (CALCIUM + D PO) Take 2 tablets by mouth daily      clopidogrel (PLAVIX) 75 mg tablet Take 1 tablet (75 mg total) by mouth daily 90 tablet 3    Multiple Vitamin (MULTIVITAMIN) tablet Take 1 tablet by mouth daily      Naproxen Sodium (ALEVE) 220 MG CAPS Take by mouth      simvastatin (ZOCOR) 40 mg tablet Take 1 tablet (40 mg total) by mouth daily at bedtime 90 tablet 3     No current facility-administered medications for this visit  Allergies   Allergen Reactions    Celecoxib GI Intolerance    Other Rash     Adhesive tape    Chocolate     Cilostazol Edema     rash    Wound Dressing Adhesive      Other reaction(s): Itching      Immunizations:     Immunization History   Administered Date(s) Administered    INFLUENZA 11/02/2017, 12/13/2018    Influenza Split High Dose Preservative Free IM 10/11/2012, 11/26/2013, 09/04/2014, 09/24/2015, 09/09/2016, 11/02/2017    Influenza TIV (IM) 10/14/2011    Influenza, high dose seasonal 0 5 mL 12/13/2018    Pneumococcal Conjugate 13-Valent 12/08/2014    Pneumococcal Polysaccharide PPV23 08/31/2005, 11/26/2013      Health Maintenance:         Topic Date Due    CRC Screening: Colonoscopy  05/01/2020 (Originally 1940)         Topic Date Due    DTaP,Tdap,and Td Vaccines (1 - Tdap) 03/28/1961    INFLUENZA VACCINE  07/01/2019      Medicare Health Risk Assessment:     Temp 98 6 °F (37 °C) (Tympanic)   Ht 5' 1" (1 549 m)   Wt 69 9 kg (154 lb)   BMI 29 10 kg/m²      Jeramy Ca is here for her Subsequent Wellness visit  Health Risk Assessment:   Patient rates overall health as very good  Patient feels that their physical health rating is slightly better  Eyesight was rated as same  Hearing was rated as same  Patient feels that their emotional and mental health rating is same  Pain experienced in the last 7 days has been some  Patient's pain rating has been 2/10   Patient states that she has experienced no weight loss or gain in last 6 months  Depression Screening:   PHQ-2 Score: 0      Fall Risk Screening: In the past year, patient has experienced: no history of falling in past year      Urinary Incontinence Screening:   Patient has not leaked urine accidently in the last six months  Home Safety:  Patient does not have trouble with stairs inside or outside of their home  Patient has working smoke alarms and has working carbon monoxide detector  Home safety hazards include: none  Nutrition:   Current diet is Regular  Medications:   Patient is currently taking over-the-counter supplements  OTC medications include: see medication list  Patient is able to manage medications  Activities of Daily Living (ADLs)/Instrumental Activities of Daily Living (IADLs):   Walk and transfer into and out of bed and chair?: Yes  Dress and groom yourself?: Yes    Bathe or shower yourself?: Yes    Feed yourself? Yes  Do your laundry/housekeeping?: Yes  Manage your money, pay your bills and track your expenses?: Yes  Make your own meals?: Yes    Do your own shopping?: Yes    Previous Hospitalizations:   Any hospitalizations or ED visits within the last 12 months?: Yes    How many hospitalizations have you had in the last year?: 1-2    Hospitalization Comments: Vascular stent    Advance Care Planning:   Living will: Yes    Durable POA for healthcare:  Yes    Advanced directive: Yes      Cognitive Screening:   Provider or family/friend/caregiver concerned regarding cognition?: No    PREVENTIVE SCREENINGS      Cardiovascular Screening:    General: History Lipid Disorder and Risks and Benefits Discussed    Due for: Lipid Panel      Diabetes Screening:     General: Risks and Benefits Discussed and Screening Current    Due for: Blood Glucose      Colorectal Cancer Screening:     General: Screening Current      Breast Cancer Screening:     General: Risks and Benefits Discussed and Patient Declines      Cervical Cancer Screening:    General: Screening Not Indicated and Risks and Benefits Discussed      Osteoporosis Screening:    General: Risks and Benefits Discussed and Patient Declines      Abdominal Aortic Aneurysm (AAA) Screening:        General: Risks and Benefits Discussed      Lung Cancer Screening:     General: Risks and Benefits Discussed and Patient Declines      Hepatitis C Screening:    General: Risks and Benefits Discussed and Screening Not Indicated    Other Counseling Topics:   Alcohol use counseling, car/seat belt/driving safety, skin self-exam, sunscreen and calcium and vitamin D intake and regular weightbearing exercise         Luma Pimentel, DO

## 2019-10-29 NOTE — PROGRESS NOTES
Assessment/Plan:  Patient received high-dose flu vaccine today  Fasting labs drawn as below  Patient to continue present treatment  She is instructed to follow a low-fat and low-cholesterol diet and get regular aerobic exercise walking 150 minutes per week as tolerated  Recommend patient discontinue smoking  Follow up with specialists as scheduled and return to the office in 6 months  Diagnoses and all orders for this visit:    Hyperlipidemia, unspecified hyperlipidemia type  -     Comprehensive metabolic panel  -     Lipid panel  -     TSH, 3rd generation with Free T4 reflex  -     UA w Reflex to Microscopic w Reflex to Culture - Clinic Collect    Peripheral arterial occlusive disease ([facility] )    Chronic obstructive pulmonary disease, unspecified COPD type ([facility] )  -     CBC and Platelet    Medicare annual wellness visit, subsequent    Primary osteoarthritis involving multiple joints    Vitamin D deficiency  -     Vitamin D 25 hydroxy    Smoking    Flu vaccine need  -     influenza vaccine, [id number], high-dose, PF 0 5 mL (FLUZONE HIGH-DOSE)          Subjective:      Patient ID: Mirella Petty is a 78 y o  female  Patient is here for follow-up appointment for chronic conditions and fasting labs  Also annual Medicare wellness exam   Patient requests flu vaccine  Patient has been feeling well overall and retired in April of this year  She continues to smoke 5-6 cigarettes per day  No regular exercise program and she admits to not following her diet carefully  Hyperlipidemia   This is a chronic problem  The problem is controlled  Recent lipid tests were reviewed and are normal  She has no history of diabetes or hypothyroidism  Pertinent negatives include no chest pain, focal sensory loss, focal weakness, leg pain, myalgias or shortness of breath  Current antihyperlipidemic treatment includes statins  The current treatment provides significant improvement of lipids   Compliance problems include adherence to exercise and adherence to diet  Risk factors for coronary artery disease include dyslipidemia, post-menopausal and family history  The following portions of the patient's history were reviewed and updated as appropriate: allergies, current medications, past family history, past medical history, past social history, past surgical history and problem list     Review of Systems   Constitutional: Negative for activity change, appetite change, fatigue and unexpected weight change  HENT: Negative  Eyes: Negative  Respiratory: Positive for cough  Negative for chest tightness, shortness of breath and wheezing  Cardiovascular: Negative for chest pain, palpitations and leg swelling  Gastrointestinal: Negative for abdominal pain, blood in stool, constipation, diarrhea, nausea and vomiting  Endocrine: Negative for cold intolerance and heat intolerance  Genitourinary: Negative for difficulty urinating, dysuria, frequency, hematuria and urgency  Musculoskeletal: Positive for arthralgias and back pain  Negative for gait problem, joint swelling, myalgias, neck pain and neck stiffness  Skin: Negative  Neurological: Negative for dizziness, focal weakness, syncope, weakness, light-headedness and headaches  Hematological: Negative for adenopathy  Does not bruise/bleed easily  Psychiatric/Behavioral: Positive for sleep disturbance  Negative for dysphoric mood  The patient is not nervous/anxious  Objective:      /74   Pulse 64   Temp 98 6 °F (37 °C) (Tympanic)   Resp 16   Ht 5' 1" (1 549 m)   Wt 69 9 kg (154 lb)   BMI 29 10 kg/m²          Physical Exam   Constitutional: She is oriented to person, place, and time  She appears well-developed and well-nourished  HENT:   Head: Normocephalic     Right Ear: External ear normal    Left Ear: External ear normal    Nose: Nose normal    Mouth/Throat: Oropharynx is clear and moist    Eyes: Conjunctivae are normal  No scleral icterus  Neck: Neck supple  No thyromegaly present  Cardiovascular: Normal rate and regular rhythm  Pulmonary/Chest: Effort normal and breath sounds normal    Abdominal: Soft  There is no tenderness  Musculoskeletal: She exhibits no edema  Lymphadenopathy:     She has no cervical adenopathy  Neurological: She is alert and oriented to person, place, and time  Skin: Skin is warm and dry  Psychiatric: She has a normal mood and affect

## 2019-10-31 LAB
25(OH)D3 SERPL-MCNC: 34 NG/ML (ref 30–100)
ALBUMIN SERPL-MCNC: 4.2 G/DL (ref 3.6–5.1)
ALBUMIN/GLOB SERPL: 1.6 (CALC) (ref 1–2.5)
ALP SERPL-CCNC: 73 U/L (ref 33–130)
ALT SERPL-CCNC: 15 U/L (ref 6–29)
APPEARANCE UR: CLEAR
AST SERPL-CCNC: 15 U/L (ref 10–35)
BACTERIA UR QL AUTO: ABNORMAL /HPF
BILIRUB SERPL-MCNC: 0.9 MG/DL (ref 0.2–1.2)
BILIRUB UR QL STRIP: NEGATIVE
BUN SERPL-MCNC: 15 MG/DL (ref 7–25)
BUN/CREAT SERPL: NORMAL (CALC) (ref 6–22)
CALCIUM SERPL-MCNC: 9.8 MG/DL (ref 8.6–10.4)
CAOX CRY #/AREA URNS HPF: ABNORMAL /HPF
CHLORIDE SERPL-SCNC: 107 MMOL/L (ref 98–110)
CHOLEST SERPL-MCNC: 159 MG/DL
CHOLEST/HDLC SERPL: 2.4 (CALC)
CO2 SERPL-SCNC: 27 MMOL/L (ref 20–32)
COLOR UR: ABNORMAL
CREAT SERPL-MCNC: 0.88 MG/DL (ref 0.6–0.93)
ERYTHROCYTE [DISTWIDTH] IN BLOOD BY AUTOMATED COUNT: 11.9 % (ref 11–15)
GLOBULIN SER CALC-MCNC: 2.7 G/DL (CALC) (ref 1.9–3.7)
GLUCOSE SERPL-MCNC: 99 MG/DL (ref 65–99)
GLUCOSE UR QL STRIP: NEGATIVE
HCT VFR BLD AUTO: 40.6 % (ref 35–45)
HDLC SERPL-MCNC: 67 MG/DL
HGB BLD-MCNC: 13.5 G/DL (ref 11.7–15.5)
HGB UR QL STRIP: NEGATIVE
HYALINE CASTS #/AREA URNS LPF: ABNORMAL /LPF
KETONES UR QL STRIP: NEGATIVE
LDLC SERPL CALC-MCNC: 77 MG/DL (CALC)
LEUKOCYTE ESTERASE UR QL STRIP: ABNORMAL
MCH RBC QN AUTO: 34.9 PG (ref 27–33)
MCHC RBC AUTO-ENTMCNC: 33.3 G/DL (ref 32–36)
MCV RBC AUTO: 104.9 FL (ref 80–100)
NITRITE UR QL STRIP: NEGATIVE
NONHDLC SERPL-MCNC: 92 MG/DL (CALC)
PH UR STRIP: ABNORMAL [PH] (ref 5–8)
PLATELET # BLD AUTO: 207 THOUSAND/UL (ref 140–400)
PMV BLD REES-ECKER: 10.7 FL (ref 7.5–12.5)
POTASSIUM SERPL-SCNC: 4.1 MMOL/L (ref 3.5–5.3)
PROT SERPL-MCNC: 6.9 G/DL (ref 6.1–8.1)
PROT UR QL STRIP: NEGATIVE
RBC # BLD AUTO: 3.87 MILLION/UL (ref 3.8–5.1)
RBC #/AREA URNS HPF: ABNORMAL /HPF
SERVICE CMNT-IMP: ABNORMAL
SL AMB EGFR AFRICAN AMERICAN: 72 ML/MIN/1.73M2
SL AMB EGFR NON AFRICAN AMERICAN: 62 ML/MIN/1.73M2
SODIUM SERPL-SCNC: 142 MMOL/L (ref 135–146)
SP GR UR STRIP: 1.02 (ref 1–1.03)
SQUAMOUS #/AREA URNS HPF: ABNORMAL /HPF
TRIGL SERPL-MCNC: 69 MG/DL
TSH SERPL-ACNC: 1.87 MIU/L (ref 0.4–4.5)
WBC # BLD AUTO: 7 THOUSAND/UL (ref 3.8–10.8)
WBC #/AREA URNS HPF: ABNORMAL /HPF

## 2019-11-04 DIAGNOSIS — R93.89 ABNORMAL CT SCAN: Primary | ICD-10-CM

## 2019-11-05 NOTE — TELEPHONE ENCOUNTER
Patient called office to report she has stopped Plavix a couple days ago  Per previous chart notes, patient was advised to continue Plavix and ASA for at least 60 days post op, then patient may remain on ASA  She confirms she is taking ASA  She asked if PCP can get note from vascular stating patient is no longer on Plavix  Advised her PCP can see this info in her chart as he is part of [facility]  Patient verbalized understanding

## 2019-11-08 ENCOUNTER — TELEPHONE (OUTPATIENT)
Dept: FAMILY MEDICINE CLINIC | Facility: CLINIC | Age: 79
End: 2019-11-08

## 2019-11-08 NOTE — TELEPHONE ENCOUNTER
Call dentist and inform them patient was instructed to discontinue Plavix per vascular surgery  If they need a note they should get it from Dr Camilo Lozano, vascular surgeon

## 2019-11-08 NOTE — TELEPHONE ENCOUNTER
Pt called, stating that she is going for dental work on 12/7/19  Dentist is requesting a letter from PCP stating that pt is no longer on Plavix  She states that she was advised to stop taking it on 10/26  She states that she actually stopped taking it on 10/29, per Dr Jessica Petty, was advised to stop Plavix 60 days after procedure, per phone note in chart dated 9/26/19  Please advise  Thank you

## 2019-11-12 ENCOUNTER — TELEPHONE (OUTPATIENT)
Dept: FAMILY MEDICINE CLINIC | Facility: CLINIC | Age: 79
End: 2019-11-12

## 2019-11-12 NOTE — TELEPHONE ENCOUNTER
Patient needs a note discontinuing blood thinners, she said she only had to be on it for two months   She needs to give it to her dentist please advise

## 2019-11-18 ENCOUNTER — TELEPHONE (OUTPATIENT)
Dept: FAMILY MEDICINE CLINIC | Facility: CLINIC | Age: 79
End: 2019-11-18

## 2019-11-18 NOTE — TELEPHONE ENCOUNTER
According to the radiologist CT of the chest is the preferred test   Patient could get a chest x-ray although this may not be adequate to find an abnormality  The other option is to be seen by a pulmonologist for possible bronchoscopy

## 2019-11-18 NOTE — TELEPHONE ENCOUNTER
Patient was informed based on CTA report from 8/26/19 radiologist recommended that she gets a CT scan done  She wants to know if you find this absolutely necessary to get this done as she is upset stating she doesn't have the money to continue getting these tests done as she is already set up for two Venous studies in March   Please advise

## 2019-11-21 ENCOUNTER — TELEPHONE (OUTPATIENT)
Dept: FAMILY MEDICINE CLINIC | Facility: CLINIC | Age: 79
End: 2019-11-21

## 2019-11-21 NOTE — TELEPHONE ENCOUNTER
YAHIRI:  Pt wanted me to advise you that she is going to wait until after the holidays to get her CT scan

## 2020-01-30 ENCOUNTER — HOSPITAL ENCOUNTER (OUTPATIENT)
Dept: CT IMAGING | Facility: HOSPITAL | Age: 80
Discharge: HOME | End: 2020-01-30
Payer: COMMERCIAL

## 2020-01-30 DIAGNOSIS — R93.89 ABNORMAL CT SCAN: ICD-10-CM

## 2020-01-30 PROCEDURE — 71250 CT THORAX DX C-: CPT

## 2020-06-12 DIAGNOSIS — E78.5 HYPERLIPIDEMIA, UNSPECIFIED HYPERLIPIDEMIA TYPE: ICD-10-CM

## 2020-06-12 RX ORDER — SIMVASTATIN 40 MG
TABLET ORAL
Qty: 90 TABLET | Refills: 3 | Status: SHIPPED | OUTPATIENT
Start: 2020-06-12 | End: 2021-06-20

## 2020-06-16 ENCOUNTER — HOSPITAL ENCOUNTER (OUTPATIENT)
Dept: NON INVASIVE DIAGNOSTICS | Facility: CLINIC | Age: 80
Discharge: HOME | End: 2020-06-16
Payer: COMMERCIAL

## 2020-06-16 DIAGNOSIS — I70.218 ATHEROSCLEROSIS OF NATIVE ARTERY OF OTHER EXTREMITY WITH INTERMITTENT CLAUDICATION (HCC): ICD-10-CM

## 2020-06-16 PROCEDURE — 93978 VASCULAR STUDY: CPT | Performed by: SURGERY

## 2020-06-16 PROCEDURE — 93925 LOWER EXTREMITY STUDY: CPT | Performed by: SURGERY

## 2020-06-16 PROCEDURE — 93922 UPR/L XTREMITY ART 2 LEVELS: CPT | Performed by: SURGERY

## 2020-06-16 PROCEDURE — 93978 VASCULAR STUDY: CPT

## 2020-06-16 PROCEDURE — 93925 LOWER EXTREMITY STUDY: CPT

## 2020-06-16 PROCEDURE — 93923 UPR/LXTR ART STDY 3+ LVLS: CPT

## 2020-06-18 ENCOUNTER — TELEPHONE (OUTPATIENT)
Dept: VASCULAR SURGERY | Facility: CLINIC | Age: 80
End: 2020-06-18

## 2020-06-29 ENCOUNTER — OFFICE VISIT (OUTPATIENT)
Dept: FAMILY MEDICINE CLINIC | Facility: CLINIC | Age: 80
End: 2020-06-29
Payer: COMMERCIAL

## 2020-06-29 VITALS
DIASTOLIC BLOOD PRESSURE: 72 MMHG | RESPIRATION RATE: 16 BRPM | BODY MASS INDEX: 27.94 KG/M2 | SYSTOLIC BLOOD PRESSURE: 138 MMHG | HEIGHT: 61 IN | OXYGEN SATURATION: 95 % | WEIGHT: 148 LBS | TEMPERATURE: 97.8 F | HEART RATE: 72 BPM

## 2020-06-29 DIAGNOSIS — M15.9 PRIMARY OSTEOARTHRITIS INVOLVING MULTIPLE JOINTS: ICD-10-CM

## 2020-06-29 DIAGNOSIS — F17.200 SMOKING: ICD-10-CM

## 2020-06-29 DIAGNOSIS — J44.9 CHRONIC OBSTRUCTIVE PULMONARY DISEASE, UNSPECIFIED COPD TYPE (HCC): ICD-10-CM

## 2020-06-29 DIAGNOSIS — E55.9 VITAMIN D DEFICIENCY: ICD-10-CM

## 2020-06-29 DIAGNOSIS — E78.5 HYPERLIPIDEMIA, UNSPECIFIED HYPERLIPIDEMIA TYPE: Primary | ICD-10-CM

## 2020-06-29 DIAGNOSIS — I77.9 PERIPHERAL ARTERIAL OCCLUSIVE DISEASE (HCC): ICD-10-CM

## 2020-06-29 DIAGNOSIS — M85.80 OSTEOPENIA, UNSPECIFIED LOCATION: ICD-10-CM

## 2020-06-29 PROBLEM — H25.13 AGE-RELATED NUCLEAR CATARACT OF BOTH EYES: Status: ACTIVE | Noted: 2020-06-08

## 2020-06-29 PROBLEM — H40.013 OPEN ANGLE WITH BORDERLINE FINDINGS, LOW RISK, BILATERAL: Status: ACTIVE | Noted: 2020-06-08

## 2020-06-29 PROCEDURE — 3008F BODY MASS INDEX DOCD: CPT | Performed by: FAMILY MEDICINE

## 2020-06-29 PROCEDURE — 4040F PNEUMOC VAC/ADMIN/RCVD: CPT | Performed by: FAMILY MEDICINE

## 2020-06-29 PROCEDURE — 99214 OFFICE O/P EST MOD 30 MIN: CPT | Performed by: FAMILY MEDICINE

## 2020-06-29 PROCEDURE — 1160F RVW MEDS BY RX/DR IN RCRD: CPT | Performed by: FAMILY MEDICINE

## 2020-07-02 ENCOUNTER — TELEPHONE (OUTPATIENT)
Dept: VASCULAR SURGERY | Facility: CLINIC | Age: 80
End: 2020-07-02

## 2020-07-06 ENCOUNTER — OFFICE VISIT (OUTPATIENT)
Dept: VASCULAR SURGERY | Facility: CLINIC | Age: 80
End: 2020-07-06
Payer: COMMERCIAL

## 2020-07-06 VITALS
WEIGHT: 147.6 LBS | HEART RATE: 77 BPM | BODY MASS INDEX: 27.87 KG/M2 | SYSTOLIC BLOOD PRESSURE: 158 MMHG | HEIGHT: 61 IN | DIASTOLIC BLOOD PRESSURE: 78 MMHG | TEMPERATURE: 98.6 F

## 2020-07-06 DIAGNOSIS — I70.211 ATHEROSCLEROSIS OF NATIVE ARTERY OF RIGHT LOWER EXTREMITY WITH INTERMITTENT CLAUDICATION (HCC): Primary | ICD-10-CM

## 2020-07-06 PROCEDURE — 4040F PNEUMOC VAC/ADMIN/RCVD: CPT | Performed by: SURGERY

## 2020-07-06 PROCEDURE — 99213 OFFICE O/P EST LOW 20 MIN: CPT | Performed by: SURGERY

## 2020-07-06 PROCEDURE — 1160F RVW MEDS BY RX/DR IN RCRD: CPT | Performed by: SURGERY

## 2020-07-06 PROCEDURE — 3008F BODY MASS INDEX DOCD: CPT | Performed by: SURGERY

## 2020-07-06 RX ORDER — CLOPIDOGREL BISULFATE 75 MG/1
75 TABLET ORAL DAILY
Qty: 90 TABLET | Refills: 3 | Status: SHIPPED | OUTPATIENT
Start: 2020-07-06 | End: 2021-09-13

## 2020-07-06 NOTE — LETTER
July 6, 2020     Xochitl Moseley, [address] [facility]  [address] [place] [address]    Patient: George Schulz   YOB: 1940   Date of Visit: 7/6/2020       Dear Dr Kaitlin Deshpande: Thank you for referring [place] to me for evaluation  Below are my notes for this consultation  If you have questions, please do not hesitate to call me  I look forward to following your patient along with you           Sincerely,        Clint Guerrero MD        CC: No Recipients

## 2020-07-06 NOTE — PROGRESS NOTES
Assessment/Plan:    Has noticed increasing symptoms of right leg claudication in the last few months  She is able to walk about a block or so at which time she has to stop for a short period time neck walk again  There is no issues with ischemic rest pain or tissue loss at this time  She has history of right femoral endarterectomy and multiple aortoiliac stenting in the past   The current lower extremity arterial study does show progression of a distal right SFA stenosis  Plan:  She has no threat of limb loss at this time, I would like to add another anti-platelet to her medications and hope that this may bring her improvement in her claudication symptoms  Follow-up in the office in 3 months    If she needs to undergo a dental extraction we can stop her Plavix and baby aspirin for 7-10 days prior to her procedure, to be started again once okayed by her dentist        Diagnoses and all orders for this visit:    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 (HCC)  -     clopidogrel (PLAVIX) 75 mg tablet; Take 1 tablet (75 mg total) by mouth daily        Subjective:      Patient ID: Tracy Orellana is a [de-identified] y o  female  Pt is here today to review results of MIKAEL and AOIL done 6/16/2020  Pt has a hx of multiple vascular procedures done to the RLE  She c/o calf pain in both legs when walking  Right leg is worse than the left leg  Pt denies any rest pain or any open wounds  She is taking ASA 81 mg and Simvastatin 40 mg  Pt is a current daily smoker  HPI    The following portions of the patient's history were reviewed and updated as appropriate: allergies, current medications, past family history, past medical history, past social history, past surgical history and problem list     Review of Systems   HENT: Positive for sneezing  Eyes: Negative  Respiratory: Negative  Cardiovascular: Negative  Gastrointestinal: Negative  Endocrine: Negative  Genitourinary: Negative  Musculoskeletal: Positive for back pain  Leg pain   Skin: Negative  Allergic/Immunologic: Positive for food allergies (chocolate)  Neurological: Negative  Hematological: Negative  Psychiatric/Behavioral: Negative  Objective: There were no vitals taken for this visit  Physical Exam          Oriented x3 no evidence of clinical depression  Eyes:  Sclera non-icteric    Skin: normal without evidence of inflammation    Neck is supple carotid pulses equal bilaterally no bruits heard    Chest lungs clear, heart regular rhythm  Abdomen soft nontender no evidence of pulsatile masses  Pulses are palpable bilateral Femoral  Popliteal, DP and PT  Neurological exam intact cranial nerves 2-12 grossly intact no gross motor sensory deficits detected        Imaging viewed and reviewed with Patient

## 2020-07-06 NOTE — PATIENT INSTRUCTIONS
Has noticed increasing symptoms of right leg claudication in the last few months  She is able to walk about a block or so at which time she has to stop for a short period time neck walk again  There is no issues with ischemic rest pain or tissue loss at this time  She has history of right femoral endarterectomy and multiple aortoiliac stenting in the past   The current lower extremity arterial study does show progression of a distal right SFA stenosis  Plan:  She has no threat of limb loss at this time, I would like to add another anti-platelet to her medications and hope that this may bring her improvement in her claudication symptoms     Follow-up in the office in 3 months    If she needs to undergo a dental extraction we can stop her Plavix and baby aspirin for 7-10 days prior to her procedure, to be started again once okayed by her dentist

## 2020-08-13 ENCOUNTER — TELEPHONE (OUTPATIENT)
Dept: OTHER | Facility: OTHER | Age: 80
End: 2020-08-13

## 2020-08-13 NOTE — TELEPHONE ENCOUNTER
PT got a call in regards to her appointment, but wasn't sure what the message was about  She would like a call back for clarification  She went ahead and confirmed the appointment, but wasn't sure if it would be canceled

## 2020-08-14 ENCOUNTER — OFFICE VISIT (OUTPATIENT)
Dept: FAMILY MEDICINE CLINIC | Facility: CLINIC | Age: 80
End: 2020-08-14
Payer: COMMERCIAL

## 2020-08-14 VITALS
DIASTOLIC BLOOD PRESSURE: 80 MMHG | OXYGEN SATURATION: 96 % | HEIGHT: 61 IN | TEMPERATURE: 98.7 F | WEIGHT: 146 LBS | SYSTOLIC BLOOD PRESSURE: 162 MMHG | RESPIRATION RATE: 16 BRPM | HEART RATE: 76 BPM | BODY MASS INDEX: 27.56 KG/M2

## 2020-08-14 DIAGNOSIS — M54.50 ACUTE RIGHT-SIDED LOW BACK PAIN WITHOUT SCIATICA: Primary | ICD-10-CM

## 2020-08-14 DIAGNOSIS — M85.80 OSTEOPENIA, UNSPECIFIED LOCATION: ICD-10-CM

## 2020-08-14 PROCEDURE — 3008F BODY MASS INDEX DOCD: CPT | Performed by: FAMILY MEDICINE

## 2020-08-14 PROCEDURE — 99214 OFFICE O/P EST MOD 30 MIN: CPT | Performed by: FAMILY MEDICINE

## 2020-08-14 PROCEDURE — 4040F PNEUMOC VAC/ADMIN/RCVD: CPT | Performed by: FAMILY MEDICINE

## 2020-08-14 PROCEDURE — 1160F RVW MEDS BY RX/DR IN RCRD: CPT | Performed by: FAMILY MEDICINE

## 2020-08-14 RX ORDER — TRAMADOL HYDROCHLORIDE 50 MG/1
50 TABLET ORAL EVERY 6 HOURS PRN
Qty: 30 TABLET | Refills: 0 | Status: SHIPPED | OUTPATIENT
Start: 2020-08-14 | End: 2020-11-05 | Stop reason: ALTCHOICE

## 2020-08-14 NOTE — PROGRESS NOTES
Assessment/Plan:  Discussed diagnostic and treatment options with patient  Patient is being sent for x-ray of the lumbar spine  Will heed results  Patient was started on tramadol 50 mg 1 q 6 hours p r n , caution regarding drowsiness  Patient may apply ice alternating with heat for 20 minutes each 3-4 times daily and rest   Return to the office in 1 week or call sooner p r n  Robinson Manifold Diagnoses and all orders for this visit:    Acute right-sided low back pain without sciatica  -     XR spine lumbar 2 or 3 views injury; Future  -     traMADol (ULTRAM) 50 mg tablet; Take 1 tablet (50 mg total) by mouth every 6 (six) hours as needed for moderate pain    Osteopenia, unspecified location  -     XR spine lumbar 2 or 3 views injury; Future          Subjective:      Patient ID: Kathy Corbin is a [de-identified] y o  female  Patient slipped on the wet bathroom floor at her home 1 week ago hitting her low back against the sink  Patient denies falling to the floor or hitting her head  She complains of right-sided low back pain since  She denies any pain, numbness, tingling or weakness radiating into her buttocks or legs  Patient denies any problems with bowel or bladder function  Patient has treated this with Aleve and rest without significant relief  Back Pain   This is a new problem  The current episode started in the past 7 days  The problem occurs constantly  The problem is unchanged  The pain is present in the lumbar spine (right)  The quality of the pain is described as stabbing  The pain does not radiate  The pain is moderate  The pain is the same all the time  The symptoms are aggravated by bending and standing  Pertinent negatives include no abdominal pain, bladder incontinence, bowel incontinence, dysuria, leg pain, numbness, paresthesias, pelvic pain, perianal numbness, tingling or weakness  She has tried NSAIDs for the symptoms  The treatment provided mild relief         The following portions of the patient's history were reviewed and updated as appropriate: allergies, current medications, past family history, past medical history, past social history, past surgical history and problem list     Review of Systems   Gastrointestinal: Negative for abdominal pain and bowel incontinence  Genitourinary: Negative for bladder incontinence, dysuria and pelvic pain  Musculoskeletal: Positive for back pain  Neurological: Negative for tingling, weakness, numbness and paresthesias  Objective:      /80 (BP Location: Left arm, Patient Position: Sitting, Cuff Size: Standard)   Pulse 76   Temp 98 7 °F (37 1 °C)   Resp 16   Ht 5' 1" (1 549 m)   Wt 66 2 kg (146 lb)   SpO2 96%   BMI 27 59 kg/m²          Physical Exam  Constitutional:       General: She is not in acute distress  Appearance: Normal appearance  HENT:      Head: Normocephalic  Eyes:      General: No scleral icterus  Conjunctiva/sclera: Conjunctivae normal    Neck:      Musculoskeletal: Neck supple  Cardiovascular:      Rate and Rhythm: Normal rate and regular rhythm  Pulmonary:      Effort: Pulmonary effort is normal       Comments: Decreased breath sounds throughout  Abdominal:      Palpations: Abdomen is soft  Tenderness: There is no abdominal tenderness  Musculoskeletal:         General: Tenderness present  Right lower leg: No edema  Left lower leg: No edema  Comments: Positive tenderness right lumbosacral area  Negative spinous process tenderness  Mild ecchymosis left lumbar paravertebral area  Lymphadenopathy:      Cervical: No cervical adenopathy  Skin:     General: Skin is warm and dry  Neurological:      General: No focal deficit present  Mental Status: She is alert and oriented to person, place, and time        Gait: Gait abnormal    Psychiatric:         Mood and Affect: Mood normal

## 2020-08-17 ENCOUNTER — APPOINTMENT (OUTPATIENT)
Dept: RADIOLOGY | Facility: MEDICAL CENTER | Age: 80
End: 2020-08-17
Payer: COMMERCIAL

## 2020-08-17 PROCEDURE — 72100 X-RAY EXAM L-S SPINE 2/3 VWS: CPT

## 2020-10-08 ENCOUNTER — TELEPHONE (OUTPATIENT)
Dept: OTHER | Facility: OTHER | Age: 80
End: 2020-10-08

## 2020-11-05 ENCOUNTER — OFFICE VISIT (OUTPATIENT)
Dept: VASCULAR SURGERY | Facility: CLINIC | Age: 80
End: 2020-11-05
Payer: COMMERCIAL

## 2020-11-05 VITALS
HEIGHT: 61 IN | SYSTOLIC BLOOD PRESSURE: 160 MMHG | TEMPERATURE: 97.4 F | HEART RATE: 76 BPM | DIASTOLIC BLOOD PRESSURE: 90 MMHG | BODY MASS INDEX: 26.43 KG/M2 | WEIGHT: 140 LBS

## 2020-11-05 DIAGNOSIS — I70.211 ATHEROSCLEROSIS OF NATIVE ARTERY OF RIGHT LOWER EXTREMITY WITH INTERMITTENT CLAUDICATION (HCC): Primary | ICD-10-CM

## 2020-11-05 PROCEDURE — 99213 OFFICE O/P EST LOW 20 MIN: CPT | Performed by: SURGERY

## 2020-11-19 ENCOUNTER — OFFICE VISIT (OUTPATIENT)
Dept: FAMILY MEDICINE CLINIC | Facility: CLINIC | Age: 80
End: 2020-11-19
Payer: COMMERCIAL

## 2020-11-19 VITALS
RESPIRATION RATE: 16 BRPM | SYSTOLIC BLOOD PRESSURE: 130 MMHG | HEIGHT: 61 IN | WEIGHT: 140 LBS | TEMPERATURE: 97.8 F | OXYGEN SATURATION: 98 % | BODY MASS INDEX: 26.43 KG/M2 | DIASTOLIC BLOOD PRESSURE: 66 MMHG | HEART RATE: 64 BPM

## 2020-11-19 DIAGNOSIS — J44.9 CHRONIC OBSTRUCTIVE PULMONARY DISEASE, UNSPECIFIED COPD TYPE (HCC): ICD-10-CM

## 2020-11-19 DIAGNOSIS — E55.9 VITAMIN D DEFICIENCY: ICD-10-CM

## 2020-11-19 DIAGNOSIS — I77.9 PERIPHERAL ARTERIAL OCCLUSIVE DISEASE (HCC): ICD-10-CM

## 2020-11-19 DIAGNOSIS — I65.23 CAROTID STENOSIS, BILATERAL: ICD-10-CM

## 2020-11-19 DIAGNOSIS — Z00.00 MEDICARE ANNUAL WELLNESS VISIT, SUBSEQUENT: ICD-10-CM

## 2020-11-19 DIAGNOSIS — M85.80 OSTEOPENIA, UNSPECIFIED LOCATION: ICD-10-CM

## 2020-11-19 DIAGNOSIS — M15.9 PRIMARY OSTEOARTHRITIS INVOLVING MULTIPLE JOINTS: ICD-10-CM

## 2020-11-19 DIAGNOSIS — E78.5 HYPERLIPIDEMIA, UNSPECIFIED HYPERLIPIDEMIA TYPE: Primary | ICD-10-CM

## 2020-11-19 DIAGNOSIS — Z23 NEED FOR INFLUENZA VACCINATION: ICD-10-CM

## 2020-11-19 PROCEDURE — 1125F AMNT PAIN NOTED PAIN PRSNT: CPT | Performed by: FAMILY MEDICINE

## 2020-11-19 PROCEDURE — 3725F SCREEN DEPRESSION PERFORMED: CPT | Performed by: FAMILY MEDICINE

## 2020-11-19 PROCEDURE — G0008 ADMIN INFLUENZA VIRUS VAC: HCPCS

## 2020-11-19 PROCEDURE — 99214 OFFICE O/P EST MOD 30 MIN: CPT | Performed by: FAMILY MEDICINE

## 2020-11-19 PROCEDURE — 1170F FXNL STATUS ASSESSED: CPT | Performed by: FAMILY MEDICINE

## 2020-11-19 PROCEDURE — T1015 CLINIC SERVICE: HCPCS | Performed by: FAMILY MEDICINE

## 2020-11-19 PROCEDURE — 90662 IIV NO PRSV INCREASED AG IM: CPT

## 2020-11-19 PROCEDURE — G0439 PPPS, SUBSEQ VISIT: HCPCS | Performed by: FAMILY MEDICINE

## 2020-11-19 PROCEDURE — 1160F RVW MEDS BY RX/DR IN RCRD: CPT | Performed by: FAMILY MEDICINE

## 2020-11-20 LAB
25(OH)D3 SERPL-MCNC: 34 NG/ML (ref 30–100)
ALBUMIN SERPL-MCNC: 4.2 G/DL (ref 3.6–5.1)
ALBUMIN/GLOB SERPL: 1.6 (CALC) (ref 1–2.5)
ALP SERPL-CCNC: 63 U/L (ref 37–153)
ALT SERPL-CCNC: 13 U/L (ref 6–29)
APPEARANCE UR: CLEAR
AST SERPL-CCNC: 14 U/L (ref 10–35)
BACTERIA UR QL AUTO: NORMAL /HPF
BILIRUB SERPL-MCNC: 0.8 MG/DL (ref 0.2–1.2)
BILIRUB UR QL STRIP: NEGATIVE
BUN SERPL-MCNC: 22 MG/DL (ref 7–25)
BUN/CREAT SERPL: 22 (CALC) (ref 6–22)
CALCIUM SERPL-MCNC: 9.5 MG/DL (ref 8.6–10.4)
CHLORIDE SERPL-SCNC: 108 MMOL/L (ref 98–110)
CHOLEST SERPL-MCNC: 130 MG/DL
CHOLEST/HDLC SERPL: 2.6 (CALC)
CO2 SERPL-SCNC: 26 MMOL/L (ref 20–32)
COLOR UR: YELLOW
CREAT SERPL-MCNC: 0.99 MG/DL (ref 0.6–0.88)
ERYTHROCYTE [DISTWIDTH] IN BLOOD BY AUTOMATED COUNT: 12.9 % (ref 11–15)
GLOBULIN SER CALC-MCNC: 2.6 G/DL (CALC) (ref 1.9–3.7)
GLUCOSE SERPL-MCNC: 90 MG/DL (ref 65–99)
GLUCOSE UR QL STRIP: NEGATIVE
HCT VFR BLD AUTO: 42.4 % (ref 35–45)
HDLC SERPL-MCNC: 50 MG/DL
HGB BLD-MCNC: 14.4 G/DL (ref 11.7–15.5)
HGB UR QL STRIP: NEGATIVE
HYALINE CASTS #/AREA URNS LPF: NORMAL /LPF
KETONES UR QL STRIP: NEGATIVE
LDLC SERPL CALC-MCNC: 64 MG/DL (CALC)
LEUKOCYTE ESTERASE UR QL STRIP: NEGATIVE
MCH RBC QN AUTO: 34.2 PG (ref 27–33)
MCHC RBC AUTO-ENTMCNC: 34 G/DL (ref 32–36)
MCV RBC AUTO: 100.7 FL (ref 80–100)
NITRITE UR QL STRIP: NEGATIVE
NONHDLC SERPL-MCNC: 80 MG/DL (CALC)
PH UR STRIP: NORMAL [PH] (ref 5–8)
PLATELET # BLD AUTO: 224 THOUSAND/UL (ref 140–400)
PMV BLD REES-ECKER: 11.3 FL (ref 7.5–12.5)
POTASSIUM SERPL-SCNC: 4.6 MMOL/L (ref 3.5–5.3)
PROT SERPL-MCNC: 6.8 G/DL (ref 6.1–8.1)
PROT UR QL STRIP: NEGATIVE
RBC # BLD AUTO: 4.21 MILLION/UL (ref 3.8–5.1)
RBC #/AREA URNS HPF: NORMAL /HPF
SL AMB EGFR AFRICAN AMERICAN: 62 ML/MIN/1.73M2
SL AMB EGFR NON AFRICAN AMERICAN: 54 ML/MIN/1.73M2
SODIUM SERPL-SCNC: 141 MMOL/L (ref 135–146)
SP GR UR STRIP: 1.02 (ref 1–1.03)
SQUAMOUS #/AREA URNS HPF: NORMAL /HPF
TRIGL SERPL-MCNC: 79 MG/DL
TSH SERPL-ACNC: 1.81 MIU/L (ref 0.4–4.5)
WBC # BLD AUTO: 7.6 THOUSAND/UL (ref 3.8–10.8)
WBC #/AREA URNS HPF: NORMAL /HPF

## 2020-12-04 ENCOUNTER — TELEPHONE (OUTPATIENT)
Dept: OBGYN CLINIC | Facility: HOSPITAL | Age: 80
End: 2020-12-04

## 2021-03-23 ENCOUNTER — IMMUNIZATIONS (OUTPATIENT)
Dept: FAMILY MEDICINE CLINIC | Facility: HOSPITAL | Age: 81
End: 2021-03-23

## 2021-03-23 DIAGNOSIS — Z23 ENCOUNTER FOR IMMUNIZATION: Primary | ICD-10-CM

## 2021-03-23 PROCEDURE — 91301 SARS-COV-2 / COVID-19 MRNA VACCINE (MODERNA) 100 MCG: CPT

## 2021-03-23 PROCEDURE — 0011A SARS-COV-2 / COVID-19 MRNA VACCINE (MODERNA) 100 MCG: CPT

## 2021-04-22 ENCOUNTER — IMMUNIZATIONS (OUTPATIENT)
Dept: FAMILY MEDICINE CLINIC | Facility: HOSPITAL | Age: 81
End: 2021-04-22

## 2021-04-22 DIAGNOSIS — Z23 ENCOUNTER FOR IMMUNIZATION: Primary | ICD-10-CM

## 2021-04-22 PROCEDURE — 0012A SARS-COV-2 / COVID-19 MRNA VACCINE (MODERNA) 100 MCG: CPT

## 2021-04-22 PROCEDURE — 91301 SARS-COV-2 / COVID-19 MRNA VACCINE (MODERNA) 100 MCG: CPT

## 2021-05-12 ENCOUNTER — RA CDI HCC (OUTPATIENT)
Dept: OTHER | Facility: HOSPITAL | Age: 81
End: 2021-05-12

## 2021-05-12 NOTE — PROGRESS NOTES
[place] [address]  coding opportunities             Chart reviewed, (number of) suggestions sent to provider: 4     Problem listed updated  Provider Accepted, (number of) suggestions accepted: 4        Patients insurance company: [employer] (Medicare Advantage and [employer])     Visit status: Patient canceled the appointment        [place] [address]  coding opportunities             Chart reviewed, (number of) suggestions sent to provider: 4      DX:  I77 9-Disorder of arteries and arterioles, unspecified  I77  1-Stricture of artery  I70 219-Atherosclerosis of native arteries of extremities with intermittent claudication, unspecified extremity  J44  9-Chronic obstructive pulmonary disease, unspecified     Patients insurance company: [employer] (Medicare Advantage and [employer])

## 2021-05-14 PROBLEM — I77.9 DISORDER OF ARTERIES AND ARTERIOLES, UNSPECIFIED (HCC): Status: ACTIVE | Noted: 2021-05-14

## 2021-05-14 PROBLEM — I77.1 STRICTURE OF ARTERY (HCC): Status: ACTIVE | Noted: 2021-05-14

## 2021-06-20 DIAGNOSIS — E78.5 HYPERLIPIDEMIA, UNSPECIFIED HYPERLIPIDEMIA TYPE: ICD-10-CM

## 2021-06-20 RX ORDER — SIMVASTATIN 40 MG
TABLET ORAL
Qty: 90 TABLET | Refills: 1 | Status: SHIPPED | OUTPATIENT
Start: 2021-06-20 | End: 2021-12-20

## 2021-06-28 ENCOUNTER — TELEPHONE (OUTPATIENT)
Dept: FAMILY MEDICINE CLINIC | Facility: CLINIC | Age: 81
End: 2021-06-28

## 2021-09-13 DIAGNOSIS — I70.211 ATHEROSCLEROSIS OF NATIVE ARTERY OF RIGHT LOWER EXTREMITY WITH INTERMITTENT CLAUDICATION (HCC): ICD-10-CM

## 2021-09-13 RX ORDER — CLOPIDOGREL BISULFATE 75 MG/1
TABLET ORAL
Qty: 90 TABLET | Refills: 1 | Status: SHIPPED | OUTPATIENT
Start: 2021-09-13 | End: 2022-06-29 | Stop reason: SDUPTHER

## 2021-09-14 ENCOUNTER — HOSPITAL ENCOUNTER (OUTPATIENT)
Dept: NON INVASIVE DIAGNOSTICS | Facility: CLINIC | Age: 81
Discharge: HOME | End: 2021-09-14
Payer: COMMERCIAL

## 2021-09-14 DIAGNOSIS — I70.211 ATHEROSCLEROSIS OF NATIVE ARTERY OF RIGHT LOWER EXTREMITY WITH INTERMITTENT CLAUDICATION (HCC): ICD-10-CM

## 2021-09-14 PROCEDURE — 93925 LOWER EXTREMITY STUDY: CPT

## 2021-09-14 PROCEDURE — 93923 UPR/LXTR ART STDY 3+ LVLS: CPT

## 2021-09-14 PROCEDURE — 93922 UPR/L XTREMITY ART 2 LEVELS: CPT | Performed by: SURGERY

## 2021-09-14 PROCEDURE — 93925 LOWER EXTREMITY STUDY: CPT | Performed by: SURGERY

## 2021-09-16 ENCOUNTER — TELEPHONE (OUTPATIENT)
Dept: ADMINISTRATIVE | Facility: HOSPITAL | Age: 81
End: 2021-09-16

## 2021-09-16 NOTE — TELEPHONE ENCOUNTER
----- Message from Iron English sent at 9/15/2021  9:27 AM EDT -----  Call patient for OV with Maria D Ortiz

## 2021-11-11 ENCOUNTER — OFFICE VISIT (OUTPATIENT)
Dept: VASCULAR SURGERY | Facility: CLINIC | Age: 81
End: 2021-11-11
Payer: COMMERCIAL

## 2021-11-11 VITALS
TEMPERATURE: 98.5 F | DIASTOLIC BLOOD PRESSURE: 78 MMHG | BODY MASS INDEX: 26.43 KG/M2 | HEIGHT: 61 IN | WEIGHT: 140 LBS | HEART RATE: 74 BPM | SYSTOLIC BLOOD PRESSURE: 122 MMHG

## 2021-11-11 DIAGNOSIS — I70.211 ATHEROSCLEROSIS OF NATIVE ARTERY OF RIGHT LOWER EXTREMITY WITH INTERMITTENT CLAUDICATION (HCC): ICD-10-CM

## 2021-11-11 DIAGNOSIS — I65.23 CAROTID STENOSIS, BILATERAL: Primary | ICD-10-CM

## 2021-11-11 PROCEDURE — 99213 OFFICE O/P EST LOW 20 MIN: CPT | Performed by: SURGERY

## 2021-11-30 ENCOUNTER — OFFICE VISIT (OUTPATIENT)
Dept: FAMILY MEDICINE CLINIC | Facility: CLINIC | Age: 81
End: 2021-11-30
Payer: COMMERCIAL

## 2021-11-30 VITALS
OXYGEN SATURATION: 97 % | RESPIRATION RATE: 16 BRPM | TEMPERATURE: 97.5 F | HEIGHT: 61 IN | DIASTOLIC BLOOD PRESSURE: 80 MMHG | WEIGHT: 138 LBS | BODY MASS INDEX: 26.06 KG/M2 | HEART RATE: 64 BPM | SYSTOLIC BLOOD PRESSURE: 136 MMHG

## 2021-11-30 DIAGNOSIS — Z00.00 MEDICARE ANNUAL WELLNESS VISIT, SUBSEQUENT: ICD-10-CM

## 2021-11-30 DIAGNOSIS — E78.5 HYPERLIPIDEMIA, UNSPECIFIED HYPERLIPIDEMIA TYPE: Primary | ICD-10-CM

## 2021-11-30 DIAGNOSIS — Z23 FLU VACCINE NEED: ICD-10-CM

## 2021-11-30 DIAGNOSIS — N18.30 STAGE 3 CHRONIC KIDNEY DISEASE, UNSPECIFIED WHETHER STAGE 3A OR 3B CKD (HCC): ICD-10-CM

## 2021-11-30 DIAGNOSIS — I70.211 ATHEROSCLEROSIS OF NATIVE ARTERY OF RIGHT LOWER EXTREMITY WITH INTERMITTENT CLAUDICATION (HCC): ICD-10-CM

## 2021-11-30 DIAGNOSIS — M15.9 PRIMARY OSTEOARTHRITIS INVOLVING MULTIPLE JOINTS: ICD-10-CM

## 2021-11-30 DIAGNOSIS — M85.80 OSTEOPENIA, UNSPECIFIED LOCATION: ICD-10-CM

## 2021-11-30 DIAGNOSIS — I65.23 CAROTID STENOSIS, BILATERAL: ICD-10-CM

## 2021-11-30 DIAGNOSIS — J44.9 CHRONIC OBSTRUCTIVE PULMONARY DISEASE, UNSPECIFIED COPD TYPE (HCC): ICD-10-CM

## 2021-11-30 DIAGNOSIS — E55.9 VITAMIN D DEFICIENCY: ICD-10-CM

## 2021-11-30 PROCEDURE — T1015 CLINIC SERVICE: HCPCS | Performed by: FAMILY MEDICINE

## 2021-11-30 PROCEDURE — 90662 IIV NO PRSV INCREASED AG IM: CPT

## 2021-11-30 PROCEDURE — 1160F RVW MEDS BY RX/DR IN RCRD: CPT | Performed by: FAMILY MEDICINE

## 2021-11-30 PROCEDURE — 1125F AMNT PAIN NOTED PAIN PRSNT: CPT | Performed by: FAMILY MEDICINE

## 2021-11-30 PROCEDURE — 3288F FALL RISK ASSESSMENT DOCD: CPT | Performed by: FAMILY MEDICINE

## 2021-11-30 PROCEDURE — 99214 OFFICE O/P EST MOD 30 MIN: CPT | Performed by: FAMILY MEDICINE

## 2021-11-30 PROCEDURE — G0439 PPPS, SUBSEQ VISIT: HCPCS | Performed by: FAMILY MEDICINE

## 2021-11-30 PROCEDURE — 3725F SCREEN DEPRESSION PERFORMED: CPT | Performed by: FAMILY MEDICINE

## 2021-11-30 PROCEDURE — G0008 ADMIN INFLUENZA VIRUS VAC: HCPCS

## 2021-11-30 PROCEDURE — 1170F FXNL STATUS ASSESSED: CPT | Performed by: FAMILY MEDICINE

## 2021-12-03 ENCOUNTER — HOSPITAL ENCOUNTER (OUTPATIENT)
Dept: NON INVASIVE DIAGNOSTICS | Facility: CLINIC | Age: 81
Discharge: HOME | End: 2021-12-03
Payer: COMMERCIAL

## 2021-12-03 ENCOUNTER — APPOINTMENT (OUTPATIENT)
Dept: LAB | Facility: HOSPITAL | Age: 81
End: 2021-12-03
Payer: COMMERCIAL

## 2021-12-03 DIAGNOSIS — N18.30 STAGE 3 CHRONIC KIDNEY DISEASE, UNSPECIFIED WHETHER STAGE 3A OR 3B CKD (HCC): ICD-10-CM

## 2021-12-03 DIAGNOSIS — I65.23 CAROTID STENOSIS, BILATERAL: ICD-10-CM

## 2021-12-03 DIAGNOSIS — E55.9 VITAMIN D DEFICIENCY: ICD-10-CM

## 2021-12-03 DIAGNOSIS — E78.5 HYPERLIPIDEMIA, UNSPECIFIED HYPERLIPIDEMIA TYPE: ICD-10-CM

## 2021-12-03 DIAGNOSIS — J44.9 CHRONIC OBSTRUCTIVE PULMONARY DISEASE, UNSPECIFIED COPD TYPE (HCC): ICD-10-CM

## 2021-12-03 LAB
25(OH)D3 SERPL-MCNC: 25.5 NG/ML (ref 30–100)
ALBUMIN SERPL BCP-MCNC: 3.8 G/DL (ref 3.5–5)
ALP SERPL-CCNC: 85 U/L (ref 46–116)
ALT SERPL W P-5'-P-CCNC: 20 U/L (ref 12–78)
ANION GAP SERPL CALCULATED.3IONS-SCNC: 9 MMOL/L (ref 4–13)
AST SERPL W P-5'-P-CCNC: 18 U/L (ref 5–45)
BILIRUB SERPL-MCNC: 0.85 MG/DL (ref 0.2–1)
BILIRUB UR QL STRIP: NEGATIVE
BUN SERPL-MCNC: 14 MG/DL (ref 5–25)
CALCIUM SERPL-MCNC: 9.1 MG/DL (ref 8.3–10.1)
CHLORIDE SERPL-SCNC: 105 MMOL/L (ref 100–108)
CHOLEST SERPL-MCNC: 175 MG/DL
CLARITY UR: CLEAR
CO2 SERPL-SCNC: 28 MMOL/L (ref 21–32)
COLOR UR: YELLOW
CREAT SERPL-MCNC: 0.97 MG/DL (ref 0.6–1.3)
ERYTHROCYTE [DISTWIDTH] IN BLOOD BY AUTOMATED COUNT: 14.4 % (ref 11.6–15.1)
GFR SERPL CREATININE-BSD FRML MDRD: 55 ML/MIN/1.73SQ M
GLUCOSE P FAST SERPL-MCNC: 93 MG/DL (ref 65–99)
GLUCOSE UR STRIP-MCNC: NEGATIVE MG/DL
HCT VFR BLD AUTO: 42.2 % (ref 34.8–46.1)
HDLC SERPL-MCNC: 84 MG/DL
HGB BLD-MCNC: 13.9 G/DL (ref 11.5–15.4)
HGB UR QL STRIP.AUTO: NEGATIVE
KETONES UR STRIP-MCNC: NEGATIVE MG/DL
LDLC SERPL CALC-MCNC: 81 MG/DL (ref 0–100)
LEUKOCYTE ESTERASE UR QL STRIP: NEGATIVE
MCH RBC QN AUTO: 32.6 PG (ref 26.8–34.3)
MCHC RBC AUTO-ENTMCNC: 32.9 G/DL (ref 31.4–37.4)
MCV RBC AUTO: 99 FL (ref 82–98)
NITRITE UR QL STRIP: NEGATIVE
NONHDLC SERPL-MCNC: 91 MG/DL
PH UR STRIP.AUTO: 5.5 [PH]
PLATELET # BLD AUTO: 206 THOUSANDS/UL (ref 149–390)
PMV BLD AUTO: 9.7 FL (ref 8.9–12.7)
POTASSIUM SERPL-SCNC: 4 MMOL/L (ref 3.5–5.3)
PROT SERPL-MCNC: 7.4 G/DL (ref 6.4–8.2)
PROT UR STRIP-MCNC: NEGATIVE MG/DL
RBC # BLD AUTO: 4.26 MILLION/UL (ref 3.81–5.12)
SODIUM SERPL-SCNC: 142 MMOL/L (ref 136–145)
SP GR UR STRIP.AUTO: 1.02 (ref 1–1.03)
TRIGL SERPL-MCNC: 52 MG/DL
TSH SERPL DL<=0.05 MIU/L-ACNC: 3.19 UIU/ML (ref 0.36–3.74)
UROBILINOGEN UR QL STRIP.AUTO: 0.2 E.U./DL
WBC # BLD AUTO: 7.27 THOUSAND/UL (ref 4.31–10.16)

## 2021-12-03 PROCEDURE — 82306 VITAMIN D 25 HYDROXY: CPT

## 2021-12-03 PROCEDURE — 80053 COMPREHEN METABOLIC PANEL: CPT

## 2021-12-03 PROCEDURE — 36415 COLL VENOUS BLD VENIPUNCTURE: CPT

## 2021-12-03 PROCEDURE — 81003 URINALYSIS AUTO W/O SCOPE: CPT

## 2021-12-03 PROCEDURE — 84443 ASSAY THYROID STIM HORMONE: CPT

## 2021-12-03 PROCEDURE — 93880 EXTRACRANIAL BILAT STUDY: CPT

## 2021-12-03 PROCEDURE — 85027 COMPLETE CBC AUTOMATED: CPT

## 2021-12-03 PROCEDURE — 80061 LIPID PANEL: CPT

## 2021-12-06 PROCEDURE — 93880 EXTRACRANIAL BILAT STUDY: CPT | Performed by: SURGERY

## 2021-12-07 ENCOUNTER — TELEPHONE (OUTPATIENT)
Dept: VASCULAR SURGERY | Facility: CLINIC | Age: 81
End: 2021-12-07

## 2021-12-20 DIAGNOSIS — E78.5 HYPERLIPIDEMIA, UNSPECIFIED HYPERLIPIDEMIA TYPE: ICD-10-CM

## 2021-12-20 RX ORDER — SIMVASTATIN 40 MG
TABLET ORAL
Qty: 90 TABLET | Refills: 1 | Status: SHIPPED | OUTPATIENT
Start: 2021-12-20 | End: 2022-05-31 | Stop reason: SDUPTHER

## 2022-01-25 ENCOUNTER — OFFICE VISIT (OUTPATIENT)
Dept: VASCULAR SURGERY | Facility: CLINIC | Age: 82
End: 2022-01-25
Payer: COMMERCIAL

## 2022-01-25 VITALS
HEIGHT: 61 IN | WEIGHT: 134 LBS | TEMPERATURE: 97.5 F | DIASTOLIC BLOOD PRESSURE: 80 MMHG | HEART RATE: 70 BPM | BODY MASS INDEX: 25.3 KG/M2 | SYSTOLIC BLOOD PRESSURE: 140 MMHG

## 2022-01-25 DIAGNOSIS — N18.30 STAGE 3 CHRONIC KIDNEY DISEASE, UNSPECIFIED WHETHER STAGE 3A OR 3B CKD (HCC): ICD-10-CM

## 2022-01-25 DIAGNOSIS — I65.23 CAROTID STENOSIS, BILATERAL: Primary | ICD-10-CM

## 2022-01-25 PROCEDURE — 1160F RVW MEDS BY RX/DR IN RCRD: CPT | Performed by: SURGERY

## 2022-01-25 PROCEDURE — 99213 OFFICE O/P EST LOW 20 MIN: CPT | Performed by: SURGERY

## 2022-01-25 NOTE — PATIENT INSTRUCTIONS
Returns for further evaluation regarding left carotid stenosis  She is asymptomatic no upper lower extremity weakness no amaurosis fugax no problems with speech or swallowing  In all likelihood her lesion is in the 60-70% range and in view of her asymptomatic condition no intervention would be indicated at this time  Her claudication symptoms are mild to moderate if she tries to walk quickly no ischemic rest pain or tissue loss      Plan:  Follow-up carotid Doppler in 6 month

## 2022-01-25 NOTE — PROGRESS NOTES
Assessment/Plan:    Returns for further evaluation regarding left carotid stenosis  She is asymptomatic no upper lower extremity weakness no amaurosis fugax no problems with speech or swallowing  In all likelihood her lesion is in the 60-70% range and in view of her asymptomatic condition no intervention would be indicated at this time  Her claudication symptoms are mild to moderate if she tries to walk quickly no ischemic rest pain or tissue loss  She is on anti-platelet medication treating her peripheral arterial disease and cerebrovascular disease however she is able to stop her Plavix and baby aspirin temporarily 7-10 days in anticipation of invasive procedure or dental treatment  Medication should be restarted as soon as possible after her intervention as per her physician  Plan:  Follow-up carotid Doppler in 6 month       Diagnoses and all orders for this visit:    Carotid stenosis, bilateral        Subjective:      Patient ID: Mónica Negrete is a 80 y o  female  Pt is here to review CV done 12/3/21  Pt denies fever, pain and chills  Pt denies stroke/TIA symptoms  She is currently smoking and is taking aspirin, plavix and simvastatin  HPI    The following portions of the patient's history were reviewed and updated as appropriate: allergies, current medications, past family history, past medical history, past social history, past surgical history and problem list     Review of Systems   Constitutional: Negative  HENT: Negative  Eyes: Negative  Respiratory: Negative  Cardiovascular: Negative  Gastrointestinal: Negative  Endocrine: Negative  Genitourinary: Negative  Musculoskeletal: Negative  Skin: Negative  Allergic/Immunologic: Negative  Neurological: Negative  Hematological: Negative  Psychiatric/Behavioral: Negative  Objective: There were no vitals taken for this visit           Physical Exam  neck is supple carotid pulses are equal bilaterally left carotid bruit is heard    I have reviewed and made appropriate changes to the review of systems input by the medical assistant  Vitals:    01/25/22 1102   BP: 140/80   BP Location: Right arm   Patient Position: Sitting   Cuff Size: Standard   Pulse: 70   Temp: 97 5 °F (36 4 °C)   TempSrc: Tympanic   Weight: 60 8 kg (134 lb)   Height: 5' 1" (1 549 m)       Patient Active Problem List   Diagnosis    Smoking    Peripheral arterial occlusive disease (HCC)    Proximal humerus fracture    Atherosclerosis of native arteries of extremities with intermittent claudication, unspecified extremity (HCC)    Chronic obstructive pulmonary disease, unspecified (HCC)    Degeneration of intervertebral disc    Hyperlipidemia    Iliac artery stenosis, bilateral (HCC)    Carotid stenosis, bilateral    Osteoarthritis    Osteopenia    Varicose veins of right lower extremity with pain    Vitamin D deficiency    Age-related nuclear cataract of both eyes    Open angle with borderline findings, low risk, bilateral    Disorder of arteries and arterioles, unspecified (HCC)    Stricture of artery (HCC)    Stage 3 chronic kidney disease, unspecified whether stage 3a or 3b CKD ([facility] [address] )       Past Surgical History:   Procedure Laterality Date    APPENDECTOMY      BREAST SURGERY      gilberto    COLONOSCOPY      HEMORRHOID SURGERY      IR AORTAGRAM WITH RUN-OFF  8/26/2019    ME RECONSTR TOTAL SHOULDER IMPLANT Right 12/8/2016    Procedure: ARTHROPLASTY SHOULDER REVERSE;  Surgeon: Ashia Beyer MD;  Location: [facility];  Service: Orthopedics    THROMBOENDARTERECTOMY      TONSILLECTOMY         History reviewed  No pertinent family history      Social History     Socioeconomic History    Marital status:      Spouse name: Not on file    Number of children: Not on file    Years of education: Not on file    Highest education level: Not on file   Occupational History    Not on file   Tobacco Use    Smoking status: Current Every Day Smoker     Packs/day: 0 50     Years: 64 00     Pack years: 32 00     Types: Cigarettes    Smokeless tobacco: Never Used   Vaping Use    Vaping Use: Never used   Substance and Sexual Activity    Alcohol use: Not Currently     Comment: quit 3 05/2020    Drug use: No    Sexual activity: Not Currently   Other Topics Concern    Not on file   Social History Narrative    Not on file     Social Determinants of Health     Financial Resource Strain: Not on file   Food Insecurity: Not on file   Transportation Needs: Not on file   Physical Activity: Not on file   Stress: Not on file   Social Connections: Not on file   Intimate Partner Violence: Not on file   Housing Stability: Not on file       Allergies   Allergen Reactions    Celecoxib GI Intolerance    Other Rash     Adhesive tape    Chocolate - Food Allergy     Cilostazol Edema     rash    Sutures  [Suture]     Wound Dressing Adhesive      Other reaction(s): Itching         Current Outpatient Medications:     aspirin 81 MG tablet, Take 81 mg by mouth daily, Disp: , Rfl:     Calcium Citrate-Vitamin D (CALCIUM + D PO), Take 2 tablets by mouth daily, Disp: , Rfl:     clopidogrel (PLAVIX) 75 mg tablet, TAKE 1 TABLET (75 MG) BY MOUTH DAILY, Disp: 90 tablet, Rfl: 1    simvastatin (ZOCOR) 40 mg tablet, TAKE 1 TABLET (40 MG) BY MOUTH DAILY AT BEDTIME, Disp: 90 tablet, Rfl: 1

## 2022-01-25 NOTE — LETTER
January 25, 2022     Veronica Bermudez, [address] [facility]  [address] [place] [address]    Patient: Rodri Williamson   YOB: 1940   Date of Visit: 1/25/2022       Dear Dr Rome Friedman: Thank you for referring Maria Teresa Otero to me for evaluation  Below are my notes for this consultation  If you have questions, please do not hesitate to call me  I look forward to following your patient along with you           Sincerely,        Dirk Cat MD        CC: No Recipients

## 2022-01-30 ENCOUNTER — HOSPITAL ENCOUNTER (EMERGENCY)
Facility: HOSPITAL | Age: 82
Discharge: HOME | End: 2022-01-31
Attending: SURGERY | Admitting: SURGERY
Payer: COMMERCIAL

## 2022-01-30 ENCOUNTER — APPOINTMENT (EMERGENCY)
Dept: RADIOLOGY | Facility: HOSPITAL | Age: 82
End: 2022-01-30
Payer: COMMERCIAL

## 2022-01-30 VITALS
RESPIRATION RATE: 20 BRPM | DIASTOLIC BLOOD PRESSURE: 61 MMHG | SYSTOLIC BLOOD PRESSURE: 126 MMHG | OXYGEN SATURATION: 99 % | HEART RATE: 62 BPM
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
## 2022-01-30 LAB
BASE EXCESS BLDA CALC-SCNC: 1 MMOL/L (ref -2–3)
GLUCOSE SERPL-MCNC: 86 MG/DL (ref 65–140)
HCO3 BLDA-SCNC: 25.8 MMOL/L (ref 24–30)
HCT VFR BLD CALC: 39 % (ref 34.8–46.1)
HGB BLDA-MCNC: 13.3 G/DL (ref 11.5–15.4)
PCO2 BLD: 27 MMOL/L (ref 21–32)
PCO2 BLD: 42.6 MM HG (ref 42–50)
PH BLD: 7.39 [PH] (ref 7.3–7.4)
PO2 BLD: 22 MM HG (ref 35–45)
POTASSIUM BLD-SCNC: 3.7 MMOL/L (ref 3.5–5.3)
SAO2 % BLD FROM PO2: 35 % (ref 60–85)
SODIUM BLD-SCNC: 139 MMOL/L (ref 136–145)
SPECIMEN SOURCE: ABNORMAL

## 2022-01-30 PROCEDURE — 85014 HEMATOCRIT: CPT

## 2022-01-30 PROCEDURE — 85384 FIBRINOGEN ACTIVITY: CPT | Performed by: SURGERY

## 2022-01-30 PROCEDURE — 93308 TTE F-UP OR LMTD: CPT | Performed by: SURGERY

## 2022-01-30 PROCEDURE — 12001 RPR S/N/AX/GEN/TRNK 2.5CM/<: CPT | Performed by: SURGERY

## 2022-01-30 PROCEDURE — 82947 ASSAY GLUCOSE BLOOD QUANT: CPT

## 2022-01-30 PROCEDURE — 85397 CLOTTING FUNCT ACTIVITY: CPT | Performed by: SURGERY

## 2022-01-30 PROCEDURE — 70450 CT HEAD/BRAIN W/O DYE: CPT

## 2022-01-30 PROCEDURE — 84132 ASSAY OF SERUM POTASSIUM: CPT

## 2022-01-30 PROCEDURE — 82803 BLOOD GASES ANY COMBINATION: CPT

## 2022-01-30 PROCEDURE — 36415 COLL VENOUS BLD VENIPUNCTURE: CPT | Performed by: SURGERY

## 2022-01-30 PROCEDURE — 84295 ASSAY OF SERUM SODIUM: CPT

## 2022-01-30 PROCEDURE — 99284 EMERGENCY DEPT VISIT MOD MDM: CPT | Performed by: SURGERY

## 2022-01-30 PROCEDURE — 99285 EMERGENCY DEPT VISIT HI MDM: CPT

## 2022-01-30 PROCEDURE — 85347 COAGULATION TIME ACTIVATED: CPT | Performed by: SURGERY

## 2022-01-30 PROCEDURE — 76604 US EXAM CHEST: CPT | Performed by: SURGERY

## 2022-01-30 PROCEDURE — 72125 CT NECK SPINE W/O DYE: CPT

## 2022-01-30 PROCEDURE — 76705 ECHO EXAM OF ABDOMEN: CPT | Performed by: SURGERY

## 2022-01-30 PROCEDURE — 85576 BLOOD PLATELET AGGREGATION: CPT | Performed by: SURGERY

## 2022-01-30 PROCEDURE — 90715 TDAP VACCINE 7 YRS/> IM: CPT | Performed by: EMERGENCY MEDICINE

## 2022-01-30 PROCEDURE — 90471 IMMUNIZATION ADMIN: CPT

## 2022-01-30 RX ADMIN — TETANUS TOXOID, REDUCED DIPHTHERIA TOXOID AND ACELLULAR PERTUSSIS VACCINE, ADSORBED 0.5 ML: 5; 2.5; 8; 8; 2.5 SUSPENSION INTRAMUSCULAR at 23:41

## 2022-01-31 ENCOUNTER — TELEPHONE (OUTPATIENT)
Dept: SURGERY | Facility: CLINIC | Age: 82
End: 2022-01-31

## 2022-01-31 LAB
CFFMA (FUNCTIONAL FIBRINOGEN MAX AMPLITUDE): 24.3 MM (ref 15–32)
CKLY30: 0.1 % (ref 0–2.6)
CKR(REACTION TIME): 6.4 MIN (ref 4.6–9.1)
CRTMA(RAPIDTEG MAX AMPLITUDE): 62 MM (ref 52–70)
HOLD SPECIMEN: NORMAL

## 2022-01-31 NOTE — H&P
H&P Exam - Trauma   Carine Enriquez 80 y o  female MRN: [id number]  Unit/Bed#: ED 18 Encounter: [id number]    Assessment/Plan   Trauma Alert: Level B  Model of Arrival: Ambulance  Trauma Team: Attending Howard Hodgson and [facility]  Consultants: None    Trauma Active Problems: Head lac    Trauma Plan:   - CT head and c-spine negative  - Head lac cleaned and repaired  - Ambulated without difficulty  - DC with outpatient follow-up    Chief Complaint: Headache    History of Present Illness   HPI:  Carine Enriquez is a 80 y o  female who presents with a headache following a fall  Pt states she had several drinks this evening and subsequently lost her balance and fall  Pt reports that she struck her head but did not lose consciousness  Grandson reports that he heard a crash and when he got to patient she was awake and oriented  Pt only complaining of a mild headache at this time without other complaints  Pt takes Plavix and ASA, per chart review  Mechanism:Fall    Review of Systems   Neurological: Positive for headaches  12-point, complete review of systems was reviewed and negative except as stated above  Historical Information     No past medical history on file  No past surgical history on file  Social History   Social History     Substance and Sexual Activity   Alcohol Use Not on file     Social History     Substance and Sexual Activity   Drug Use Not on file     Social History     Tobacco Use   Smoking Status Not on file   Smokeless Tobacco Not on file     No existing history information found  No existing history information found  Immunization History   Administered Date(s) Administered    Tdap 01/30/2022     Last Tetanus: Unknown;  Will update  Family History: Non-contributory      Meds/Allergies   PTA meds:   Meds: ASA, Plavix, simvastatin    Allergies: Celecoxib    PHYSICAL EXAM    Objective   Vitals:   First set: Pulse: 67 (01/30/22 2251)  Respirations: 20 (01/30/22 2251)  Blood Pressure: 161/78 (01/30/22 2251)    Primary Survey:   (A) Airway: Patent  (B) Breathing: Bilateral breath sounds  (C) Circulation: Pulses:   normal  (D) Disabliity:  GCS Total:  15  (E) Expose:  Completed    Secondary Survey: (Click on Physical Exam tab above)  Physical Exam  Vitals reviewed  Constitutional:       General: She is not in acute distress  Appearance: She is well-developed  She is not toxic-appearing or diaphoretic  HENT:      Head: Normocephalic  Comments: 2cm laceration to the R posterior aspect of scalp; Minimal bleeding; No underlying instability noted     Right Ear: Tympanic membrane and external ear normal       Left Ear: Tympanic membrane and external ear normal       Nose: Nose normal       Mouth/Throat:      Pharynx: Oropharynx is clear  Eyes:      Extraocular Movements: Extraocular movements intact  Conjunctiva/sclera: Conjunctivae normal       Pupils: Pupils are equal, round, and reactive to light  Cardiovascular:      Rate and Rhythm: Normal rate and regular rhythm  Pulses:           Radial pulses are 2+ on the right side and 2+ on the left side  Femoral pulses are 2+ on the right side and 2+ on the left side  Dorsalis pedis pulses are 2+ on the right side and 2+ on the left side  Heart sounds: Normal heart sounds  Pulmonary:      Effort: Pulmonary effort is normal  No respiratory distress  Breath sounds: Normal breath sounds  Abdominal:      General: There is no distension  Palpations: Abdomen is soft  Tenderness: There is no abdominal tenderness  Musculoskeletal:         General: Normal range of motion  Cervical back: Normal range of motion and neck supple  Right lower leg: No edema  Left lower leg: No edema  Skin:     General: Skin is warm and dry  Capillary Refill: Capillary refill takes less than 2 seconds  Neurological:      General: No focal deficit present        Mental Status: She is alert and oriented to person, place, and time  Cranial Nerves: Cranial nerves are intact  Sensory: Sensation is intact  Motor: Motor function is intact  Psychiatric:         Speech: Speech normal          Behavior: Behavior is cooperative           Invasive Devices  Report    Peripheral Intravenous Line            Peripheral IV 01/30/22 Left Antecubital 1 day                Lab Results: ISTAT: No components found for: VBG  Imaging/EKG Studies: CT Scan Head: No acute findings, CT Scan C-Spine: No acute findings   Other Studies: FAST shows trace pericardial effusion    Code Status: No Order  Advance Directive and Living Will:      Power of :    POLST:

## 2022-01-31 NOTE — DISCHARGE INSTRUCTIONS
Follow-up with PCP for further care  You may also follow up with the trauma clinic if needed  Contact info provided below  Use over the counter medications such as ibuprofen or tylenol as stated on the bottle as needed for pain control  Keep your wound clean and dry  You may shower in 24 hours but do not soak your wound  Return to the ED or see your PCP in 10 days for staple removal    Return to the ED with new or worsening symptoms  Staple Care   WHAT YOU NEED TO KNOW:   Staples are often used to close a wound  Your staples may be placed for 3 to 14 days, depending on the location of your wound  DISCHARGE INSTRUCTIONS:   Care for your wound:   · Clean:      ? You may be able to shower in 24 hours  Do not soak your wound under water  ? Gently wash your wound with soap and warm water daily  Lightly pat it dry  Do not cover your wound unless your healthcare provider tells you to      ? You may also need to clean your wound with a mixture of hydrogen peroxide and water  Ask how to do this  ? Do not apply ointment or cream to the wound unless your healthcare provider tells you to  · Elevate:      ? Rest any arm or leg that has a wound on pillows above the level of your heart  Do this as often as possible for 2 days  This will help decrease swelling and pain, and help you heal faster  · Minimize scarring:      ? Avoid sunshine on your wound to reduce scarring  Follow up with your healthcare provider as directed: You may need to return for a wound checkup 3 days after your staples are placed  Ask when you should return to get your staples removed  Staple removal:   · A medical staple remover  will be used to take out your staples  Your healthcare provider will slide the tool under each staple, squeeze the handle, and gently pull the staple out  · Medical tape  will be placed on your wound once your staples are removed  This will help keep your wound closed   The medical tape will fall off on its own after several days  Contact your healthcare provider if:   · You have redness, pain, swelling, or pus draining from your wound  · Your pain medicine does not relieve your pain  · You have a fever of 101°F (38 5°C) or higher  · You have an odor coming from your wound  · You have questions or concerns about your condition or care  Seek care immediately if:   · Your wound reopens  · You have red streaks on your skin that spread out from your wound  · You have severe pain or vomiting  © Copyright [employer] 2021 Information is for End User's use only and may not be sold, redistributed or otherwise used for commercial purposes  All illustrations and images included in CareNotes® are the copyrighted property of A D A M , Inc  or Rory Ricardo   The above information is an  only  It is not intended as medical advice for individual conditions or treatments  Talk to your doctor, nurse or pharmacist before following any medical regimen to see if it is safe and effective for you  Fall Prevention   WHAT YOU NEED TO KNOW:   Fall prevention includes ways to make your home and other areas safer  It also includes ways you can move more carefully to prevent a fall  Health conditions that cause changes in your blood pressure, vision, or muscle strength and coordination may increase your risk for falls  Medicines may also increase your risk for falls if they make you dizzy, weak, or sleepy  DISCHARGE INSTRUCTIONS:   Call 911 or have someone else call if:   · You have fallen and are unconscious  · You have fallen and cannot move part of your body  Contact your healthcare provider if:   · You have fallen and have pain or a headache  · You have questions or concerns about your condition or care  Fall prevention tips:   · Stand or sit up slowly  This may help you keep your balance and prevent falls  · Use assistive devices as directed    Your healthcare provider may suggest that you use a cane or walker to help you keep your balance  You may need to have grab bars put in your bathroom near the toilet or in the shower  · Wear shoes that fit well and have soles that   Wear shoes both inside and outside  Use slippers with good   Do not wear shoes with high heels  · Wear a personal alarm  This is a device that allows you to call 911 if you fall and need help  Ask your healthcare provider for more information  · Stay active  Exercise can help strengthen your muscles and improve your balance  Your healthcare provider may recommend water aerobics or walking  He or she may also recommend physical therapy to improve your coordination  Never start an exercise program without talking to your healthcare provider first          · Manage your medical conditions  Keep all appointments with your healthcare providers  Visit your eye doctor as directed  Home safety tips:       · Add items to prevent falls in the bathroom  Put nonslip strips on your bath or shower floor to prevent you from slipping  Use a bath mat if you do not have carpet in the bathroom  This will prevent you from falling when you step out of the bath or shower  Use a shower seat so you do not need to stand while you shower  Sit on the toilet or a chair in your bathroom to dry yourself and put on clothing  This will prevent you from losing your balance from drying or dressing yourself while you are standing  · Keep paths clear  Remove books, shoes, and other objects from walkways and stairs  Place cords for telephones and lamps out of the way so that you do not need to walk over them  Tape them down if you cannot move them  Remove small rugs  If you cannot remove a rug, secure it with double-sided tape  This will prevent you from tripping  · Install bright lights in your home  Use night lights to help light paths to the bathroom or kitchen   Always turn on the light before you start walking  · Keep items you use often on shelves within reach  Do not use a step stool to help you reach an item  · Paint or place reflective tape on the edges of your stairs  This will help you see the stairs better  Follow up with your doctor as directed:  Write down your questions so you remember to ask them during your visits  © Copyright [employer] 2021 Information is for End User's use only and may not be sold, redistributed or otherwise used for commercial purposes  All illustrations and images included in CareNotes® are the copyrighted property of [employer]  or [facility] Bridget Ricardo   The above information is an  only  It is not intended as medical advice for individual conditions or treatments  Talk to your doctor, nurse or pharmacist before following any medical regimen to see if it is safe and effective for you

## 2022-01-31 NOTE — TELEPHONE ENCOUNTER
Patient fell 1/30/22 resulting in scalp laceration  Her f/u is on 2/8/22 for suture removal  She wants to know when she can shampoo her hair  In the meantime I told her to follow the instructions and wear a shower cap until permitted to wash her hair  Patient understood and agreed  Please advise  Informed patient to you Dove shampoo but do not soak or submerge head under water  Patient understood

## 2022-01-31 NOTE — PROCEDURES
Laceration repair    Date/Time: 1/30/2022 11:49 PM  Performed by: Alfredo Caraballo MD  Authorized by: Alfredo Caraballo MD   Consent: Verbal consent obtained    Risks and benefits: risks, benefits and alternatives were discussed  Consent given by: patient  Patient identity confirmed: verbally with patient  Body area: head/neck  Location details: scalp  Laceration length: 2 cm  Vascular damage: no      Procedure Details:  Irrigation solution: saline  Irrigation method: syringe  Amount of cleaning: standard  Skin closure: staples  Number of sutures: 3 (Staples)  Approximation: close  Approximation difficulty: simple  Dressing: 4x4 sterile gauze  Patient tolerance: patient tolerated the procedure well with no immediate complications

## 2022-01-31 NOTE — PROCEDURES
POC FAST US    Date/Time: 1/30/2022 11:35 PM  Performed by: Diana Mccollum MD  Authorized by: Diana Mccollum MD     Patient location:  ED and Trauma  Procedure details:     Exam Type:  Diagnostic    Indications comment:  Fall    Assess for:  Hemothorax, intra-abdominal fluid, pericardial effusion and pneumothorax    Technique: extended FAST      Views obtained:  Heart - Pericardial sac, Left thorax, RUQ - Mondragon's Pouch, Right thorax, LUQ - Splenorenal space and Suprapubic - Pouch of Conner    Image quality: diagnostic      Image availability:  Images available in PACS  FAST Findings:     RUQ (Hepatorenal) free fluid: absent      LUQ (Splenorenal) free fluid: absent      Suprapubic free fluid: absent      Cardiac wall motion: identified      Pericardial effusion: trace    extended FAST (Pulmonary) findings:     Left lung sliding: Present      Right lung sliding: Present    Interpretation:     Impressions: positive      Positive findings: fluid in pericardial space      Positive findings comment:  Trace

## 2022-02-08 ENCOUNTER — OFFICE VISIT (OUTPATIENT)
Dept: SURGERY | Facility: CLINIC | Age: 82
End: 2022-02-08
Payer: COMMERCIAL

## 2022-02-08 VITALS — BODY MASS INDEX: 25.83 KG/M2 | TEMPERATURE: 97.4 F | HEIGHT: 61 IN | WEIGHT: 136.8 LBS
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
## 2022-02-08 DIAGNOSIS — S01.01XD LACERATION OF SCALP, SUBSEQUENT ENCOUNTER: Primary | ICD-10-CM

## 2022-02-08 PROBLEM — S01.01XA SCALP LACERATION: Status: ACTIVE | Noted: 2022-02-08
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
## 2022-02-08 PROCEDURE — 99212 OFFICE O/P EST SF 10 MIN: CPT | Performed by: SURGERY

## 2022-02-08 NOTE — PROGRESS NOTES
HPI:  The patient is a 80year old female who on 1/30/22 sustained a fall  She states she lost her balance and fell  She was on a DOAC - ASA/Plavix  CT head was negative for ICH, CT C-spine was negative for Fracture  She was observed in the ED  Her head laceration was cleaned and repaired with 3 staples  She was discharged to home that day  Assessment/Plan:    Fall  Patient sustained Fall on DOAC on 1/30/22  Patient sustained a Scalp Laceration  Here today for followup  - Patient doing well  - No complaints  - Does endorse some occasional dizziness that seemed to have started prior to fall  Has follow up for [facility] by PCP/CARDS suspect source  Scalp laceration  Patient sustained scalp laceration with fall  - Scalp lac stapled in ED   - Scalp lac healing well  - Staples removed x 3  - Leave ALEXIS      Subjective: Patient states she is doing well  She denies headache, light headedness, does endorse occasional dizziness  She states she is back to doing her usual routine at home  She does not drive any longer  She states she smokes about 4 cigarettes a day and drinks alcohol about 3-4 times a week, sometimes less  She is not interested in quitting at this time  The patient lives with her daughter and nephew  Review of Systems   Constitutional: Negative  HENT: Positive for congestion  Respiratory: Negative  Negative for cough and choking  Neurological: Positive for dizziness  Negative for light-headedness and headaches  Objective:  Temp (!) 97 4 °F (36 3 °C) (Temporal)   Ht 5' 1" (1 549 m)   Wt 62 1 kg (136 lb 12 8 oz)   BMI 25 85 kg/m²    Physical Exam  Constitutional:       General: She is not in acute distress  Appearance: Normal appearance  HENT:      Head: Normocephalic  Comments: Healing laceration on Right sided of head with 3 staples intact     Nose: Nose normal    Cardiovascular:      Rate and Rhythm: Normal rate and regular rhythm        Heart sounds: Normal heart sounds  Pulmonary:      Effort: Pulmonary effort is normal  No respiratory distress  Breath sounds: Normal breath sounds  Skin:     General: Skin is warm  Neurological:      General: No focal deficit present  Mental Status: She is alert and oriented to person, place, and time

## 2022-02-08 NOTE — PROGRESS NOTES
Office Visit - [address] [place] [address] MRN: [id number]  Encounter: [id number]    Assessment and Plan  Problem List Items Addressed This Visit     None          Chief Complaint:  Kathy Corbin is a 80 y o  female who presents for Fall (f/u fall  and staple removal )    Subjective      Past Medical History:   Diagnosis Date    Food allergy     chocholate    Hyperlipidemia     Peripheral arterial occlusive disease ([address] )     Right humeral fracture     right poximal    Smoking     Wears dentures     full upper    Wears glasses        Past Surgical History:   Procedure Laterality Date    APPENDECTOMY      BREAST SURGERY      gilberto    COLONOSCOPY      HEMORRHOID SURGERY      IR AORTAGRAM WITH RUN-OFF  8/26/2019    HI RECONSTR TOTAL SHOULDER IMPLANT Right 12/8/2016    Procedure: ARTHROPLASTY SHOULDER REVERSE;  Surgeon: Josefina Charlton MD;  Location: [facility];  Service: Orthopedics    THROMBOENDARTERECTOMY      TONSILLECTOMY         History reviewed  No pertinent family history  Social History     Tobacco Use    Smoking status: Current Every Day Smoker     Packs/day: 0 50     Years: 64 00     Pack years: 32 00     Types: Cigarettes    Smokeless tobacco: Never Used   Vaping Use    Vaping Use: Never used   Substance Use Topics    Alcohol use: Not Currently     Comment: quit 3 05/2020    Drug use: No        Medications  Current Outpatient Medications on File Prior to Visit   Medication Sig Dispense Refill    aspirin 81 MG tablet Take 81 mg by mouth daily      Calcium Citrate-Vitamin D (CALCIUM + D PO) Take 2 tablets by mouth daily      clopidogrel (PLAVIX) 75 mg tablet TAKE 1 TABLET (75 MG) BY MOUTH DAILY 90 tablet 1    simvastatin (ZOCOR) 40 mg tablet TAKE 1 TABLET (40 MG) BY MOUTH DAILY AT BEDTIME 90 tablet 1     No current facility-administered medications on file prior to visit         Allergies  Allergies   Allergen Reactions    Celecoxib GI Intolerance    Other Rash     Adhesive tape    Chocolate - Food Allergy     Cilostazol Edema     rash    Sutures  [Suture]     Wound Dressing Adhesive      Other reaction(s): Itching       Review of Systems    Objective  Vitals:    02/08/22 1302   Temp: (!) 97 4 °F (36 3 °C)       Physical Exam

## 2022-02-08 NOTE — ASSESSMENT & PLAN NOTE
Patient sustained scalp laceration with fall  - Scalp lac stapled in ED   - Scalp lac healing well    - Staples removed x 3  - Leave ALEXIS

## 2022-02-08 NOTE — ASSESSMENT & PLAN NOTE
Patient sustained Fall on DOAC on 1/30/22  Patient sustained a Scalp Laceration  Here today for followup  - Patient doing well  - No complaints  - Does endorse some occasional dizziness that seemed to have started prior to fall  Has follow up for [address] [place] [address] by PCP/CARDS suspect source

## 2022-05-23 ENCOUNTER — RA CDI HCC (OUTPATIENT)
Dept: OTHER | Facility: HOSPITAL | Age: 82
End: 2022-05-23

## 2022-05-23 NOTE — PROGRESS NOTES
[facility]  coding opportunities       Chart reviewed, no opportunity found: CHART REVIEWED, NO OPPORTUNITY FOUND        Patients Insurance        Commercial Insurance: [employer]     Please review/recertify the BPA diagnoses for 2022

## 2022-05-31 ENCOUNTER — OFFICE VISIT (OUTPATIENT)
Dept: FAMILY MEDICINE CLINIC | Facility: CLINIC | Age: 82
End: 2022-05-31
Payer: COMMERCIAL

## 2022-05-31 VITALS
HEART RATE: 72 BPM | HEIGHT: 61 IN | TEMPERATURE: 97.9 F | RESPIRATION RATE: 16 BRPM | WEIGHT: 134 LBS | BODY MASS INDEX: 25.3 KG/M2 | DIASTOLIC BLOOD PRESSURE: 85 MMHG | OXYGEN SATURATION: 99 % | SYSTOLIC BLOOD PRESSURE: 130 MMHG

## 2022-05-31 DIAGNOSIS — E55.9 VITAMIN D DEFICIENCY: ICD-10-CM

## 2022-05-31 DIAGNOSIS — M15.9 PRIMARY OSTEOARTHRITIS INVOLVING MULTIPLE JOINTS: ICD-10-CM

## 2022-05-31 DIAGNOSIS — M85.80 OSTEOPENIA, UNSPECIFIED LOCATION: ICD-10-CM

## 2022-05-31 DIAGNOSIS — I65.23 CAROTID STENOSIS, BILATERAL: ICD-10-CM

## 2022-05-31 DIAGNOSIS — I74.3 EMBOLISM AND THROMBOSIS OF ARTERIES OF THE LOWER EXTREMITIES (HCC): ICD-10-CM

## 2022-05-31 DIAGNOSIS — E78.5 HYPERLIPIDEMIA, UNSPECIFIED HYPERLIPIDEMIA TYPE: Primary | ICD-10-CM

## 2022-05-31 DIAGNOSIS — J44.9 CHRONIC OBSTRUCTIVE PULMONARY DISEASE, UNSPECIFIED COPD TYPE (HCC): ICD-10-CM

## 2022-05-31 PROCEDURE — 99214 OFFICE O/P EST MOD 30 MIN: CPT | Performed by: FAMILY MEDICINE

## 2022-05-31 RX ORDER — SIMVASTATIN 40 MG
40 TABLET ORAL
Qty: 90 TABLET | Refills: 1 | Status: SHIPPED | OUTPATIENT
Start: 2022-05-31

## 2022-05-31 NOTE — PROGRESS NOTES
Assessment/Plan:  Patient to continue present treatment  Patient instructed to follow a low-fat and a low-salt diet and get regular exercise walking as tolerated  Recommend patient discontinue smoking  Follow up with specialists as scheduled return the office in 6 months  Diagnoses and all orders for this visit:    Hyperlipidemia, unspecified hyperlipidemia type  -     simvastatin (ZOCOR) 40 mg tablet; Take 1 tablet (40 mg total) by mouth daily at bedtime    Chronic obstructive pulmonary disease, unspecified COPD type ([address] )    Embolism and thrombosis of arteries of the lower extremities (HCC)    Carotid stenosis, bilateral    Primary osteoarthritis involving multiple joints    Osteopenia, unspecified location    Vitamin D deficiency          Subjective:      Patient ID: Ruddy Bermudez is a 80 y o  female  Patient is here for follow-up appoint for chronic conditions and reviewed fasting labs from last appointment  Patient has been feeling well overall although admits to chronic low back pain  Patient continues to smoke but discontinued alcohol completely  She follows with Dr America Barker at [facility] vascular [facility] regularly  Hyperlipidemia  This is a chronic problem  The problem is controlled  She has no history of diabetes or hypothyroidism  Associated symptoms include leg pain and shortness of breath  Pertinent negatives include no chest pain, focal sensory loss, focal weakness or myalgias  Current antihyperlipidemic treatment includes statins  The current treatment provides significant improvement of lipids  Compliance problems include adherence to exercise    Risk factors for coronary artery disease include dyslipidemia, family history and post-menopausal        The following portions of the patient's history were reviewed and updated as appropriate: allergies, current medications, past family history, past medical history, past social history, past surgical history and problem list     Review of Systems   Respiratory: Positive for shortness of breath  Cardiovascular: Negative for chest pain  Musculoskeletal: Negative for myalgias  Neurological: Negative for focal weakness  Objective:      /85 (BP Location: Left arm, Patient Position: Sitting, Cuff Size: Standard)   Pulse 72   Temp 97 9 °F (36 6 °C) (Skin)   Resp 16   Ht 5' 1" (1 549 m)   Wt 60 8 kg (134 lb)   SpO2 99%   BMI 25 32 kg/m²          Physical Exam  Constitutional:       General: She is not in acute distress  Appearance: Normal appearance  HENT:      Head: Normocephalic  Mouth/Throat:      Mouth: Mucous membranes are moist    Eyes:      General: No scleral icterus  Conjunctiva/sclera: Conjunctivae normal    Neck:      Vascular: No carotid bruit  Cardiovascular:      Rate and Rhythm: Normal rate and regular rhythm  Pulmonary:      Effort: Pulmonary effort is normal  No respiratory distress  Breath sounds: No wheezing  Comments: Decreased breath sounds throughout  Abdominal:      Palpations: Abdomen is soft  Tenderness: There is no abdominal tenderness  Musculoskeletal:      Cervical back: Neck supple  Right lower leg: No edema  Left lower leg: No edema  Lymphadenopathy:      Cervical: No cervical adenopathy  Skin:     General: Skin is warm and dry  Neurological:      General: No focal deficit present  Mental Status: She is alert and oriented to person, place, and time  Psychiatric:         Mood and Affect: Mood normal          Behavior: Behavior normal          Thought Content: Thought content normal          Judgment: Judgment normal          BMI Counseling: Body mass index is 25 32 kg/m²  The BMI is above normal  Nutrition recommendations include 3-5 servings of fruits/vegetables daily and consuming healthier snacks  Exercise recommendations include moderate aerobic physical activity for 150 minutes/week

## 2022-06-29 ENCOUNTER — TRANSCRIBE ORDERS (OUTPATIENT)
Dept: VASCULAR SURGERY | Facility: CLINIC | Age: 82
End: 2022-06-29

## 2022-06-29 DIAGNOSIS — I70.211 ATHEROSCLEROSIS OF NATIVE ARTERY OF RIGHT LOWER EXTREMITY WITH INTERMITTENT CLAUDICATION (HCC): Primary | ICD-10-CM

## 2022-06-29 DIAGNOSIS — I70.211 ATHEROSCLEROSIS OF NATIVE ARTERY OF RIGHT LOWER EXTREMITY WITH INTERMITTENT CLAUDICATION (HCC): ICD-10-CM

## 2022-06-29 RX ORDER — CLOPIDOGREL BISULFATE 75 MG/1
75 TABLET ORAL DAILY
Qty: 90 TABLET | Refills: 0 | Status: SHIPPED | OUTPATIENT
Start: 2022-06-29

## 2022-07-26 ENCOUNTER — HOSPITAL ENCOUNTER (OUTPATIENT)
Dept: NON INVASIVE DIAGNOSTICS | Facility: CLINIC | Age: 82
Discharge: HOME | End: 2022-07-26
Payer: COMMERCIAL

## 2022-07-26 ENCOUNTER — TELEPHONE (OUTPATIENT)
Dept: VASCULAR SURGERY | Facility: CLINIC | Age: 82
End: 2022-07-26

## 2022-07-26 DIAGNOSIS — I70.211 ATHEROSCLEROSIS OF NATIVE ARTERY OF RIGHT LOWER EXTREMITY WITH INTERMITTENT CLAUDICATION (HCC): ICD-10-CM

## 2022-07-26 DIAGNOSIS — I65.23 CAROTID STENOSIS, BILATERAL: ICD-10-CM

## 2022-07-26 DIAGNOSIS — I65.23 CAROTID STENOSIS, BILATERAL: Primary | ICD-10-CM

## 2022-07-26 PROCEDURE — 93978 VASCULAR STUDY: CPT

## 2022-07-26 PROCEDURE — 93978 VASCULAR STUDY: CPT | Performed by: SURGERY

## 2022-07-26 PROCEDURE — 93923 UPR/LXTR ART STDY 3+ LVLS: CPT

## 2022-07-26 PROCEDURE — 93922 UPR/L XTREMITY ART 2 LEVELS: CPT | Performed by: SURGERY

## 2022-07-26 PROCEDURE — 93925 LOWER EXTREMITY STUDY: CPT | Performed by: SURGERY

## 2022-07-26 PROCEDURE — 93880 EXTRACRANIAL BILAT STUDY: CPT

## 2022-07-26 PROCEDURE — 93880 EXTRACRANIAL BILAT STUDY: CPT | Performed by: SURGERY

## 2022-07-26 PROCEDURE — 93925 LOWER EXTREMITY STUDY: CPT

## 2022-07-26 NOTE — TELEPHONE ENCOUNTER
Can try to obtain CTA head and neck prior to appointment   If unable to, can keep appointment for that date for further discussion

## 2022-07-26 NOTE — TELEPHONE ENCOUNTER
Spoke with Arely Cai, from the Vascular Lab  Pt  Had carotid complete done today  Left internal carotid is now 70%-99% stenosed (376/121)  Prior was >70% stenosis (244/38)  Per Arely Cai, pt is asymptomatic  AOIL - same results as last time  Pt has appt on 8/4/22 to discuss results with Dr Manuel Louis  Routed to triage to advise if anything further is needed before the scheduled appt

## 2022-07-27 NOTE — TELEPHONE ENCOUNTER
CTA scheduled for 8/4/22  Spoke with Karen Gonzalez  She will reach out to pt to reschedule the OV with Dr Siobhan Wan

## 2022-07-27 NOTE — TELEPHONE ENCOUNTER
Spoke with pt  Made her aware the CTA was ordered  BMP must be done before the CTA  Pt verbalized understanding  Pt also stated that she needs to reschedule her OV with Dr Kevin Chandler  Transferred to Central Scheduling to schedule the CTA  Once scheduled, will need to reschedule the OV with Dr Kevin Chandler

## 2022-07-28 ENCOUNTER — APPOINTMENT (OUTPATIENT)
Dept: LAB | Facility: IMAGING CENTER | Age: 82
End: 2022-07-28
Payer: COMMERCIAL

## 2022-07-28 DIAGNOSIS — I65.23 CAROTID STENOSIS, BILATERAL: ICD-10-CM

## 2022-07-28 LAB
ANION GAP SERPL CALCULATED.3IONS-SCNC: 1 MMOL/L (ref 4–13)
BUN SERPL-MCNC: 14 MG/DL (ref 5–25)
CALCIUM SERPL-MCNC: 9.7 MG/DL (ref 8.3–10.1)
CHLORIDE SERPL-SCNC: 109 MMOL/L (ref 96–108)
CO2 SERPL-SCNC: 28 MMOL/L (ref 21–32)
CREAT SERPL-MCNC: 0.85 MG/DL (ref 0.6–1.3)
GFR SERPL CREATININE-BSD FRML MDRD: 64 ML/MIN/1.73SQ M
GLUCOSE P FAST SERPL-MCNC: 88 MG/DL (ref 65–99)
POTASSIUM SERPL-SCNC: 4.6 MMOL/L (ref 3.5–5.3)
SODIUM SERPL-SCNC: 138 MMOL/L (ref 135–147)

## 2022-07-28 PROCEDURE — 80048 BASIC METABOLIC PNL TOTAL CA: CPT

## 2022-07-28 PROCEDURE — 36415 COLL VENOUS BLD VENIPUNCTURE: CPT

## 2022-08-04 ENCOUNTER — HOSPITAL ENCOUNTER (OUTPATIENT)
Dept: RADIOLOGY | Age: 82
Discharge: HOME | End: 2022-08-04
Payer: COMMERCIAL

## 2022-08-04 DIAGNOSIS — I65.23 CAROTID STENOSIS, BILATERAL: ICD-10-CM

## 2022-08-04 PROCEDURE — 70496 CT ANGIOGRAPHY HEAD: CPT

## 2022-08-04 PROCEDURE — G1004 CDSM NDSC: HCPCS

## 2022-08-04 PROCEDURE — 70498 CT ANGIOGRAPHY NECK: CPT

## 2022-08-04 RX ADMIN — IOHEXOL 64 ML: 350 INJECTION, SOLUTION INTRAVENOUS at 11:19

## 2022-08-23 ENCOUNTER — TELEPHONE (OUTPATIENT)
Dept: VASCULAR SURGERY | Facility: CLINIC | Age: 82
End: 2022-08-23

## 2022-08-23 ENCOUNTER — OFFICE VISIT (OUTPATIENT)
Dept: VASCULAR SURGERY | Facility: CLINIC | Age: 82
End: 2022-08-23
Payer: COMMERCIAL

## 2022-08-23 VITALS
DIASTOLIC BLOOD PRESSURE: 86 MMHG | HEIGHT: 61 IN | HEART RATE: 66 BPM | WEIGHT: 138.4 LBS | BODY MASS INDEX: 26.13 KG/M2 | SYSTOLIC BLOOD PRESSURE: 158 MMHG

## 2022-08-23 DIAGNOSIS — I65.23 CAROTID STENOSIS, BILATERAL: Primary | ICD-10-CM

## 2022-08-23 PROCEDURE — 99214 OFFICE O/P EST MOD 30 MIN: CPT | Performed by: SURGERY

## 2022-08-23 PROCEDURE — 1160F RVW MEDS BY RX/DR IN RCRD: CPT | Performed by: SURGERY

## 2022-08-23 RX ORDER — CEFAZOLIN SODIUM 2 G/50ML
2000 SOLUTION INTRAVENOUS ONCE
Status: CANCELLED | OUTPATIENT
Start: 2022-09-28 | End: 2022-08-23

## 2022-08-23 RX ORDER — CHLORHEXIDINE GLUCONATE 0.12 MG/ML
15 RINSE ORAL ONCE
Status: CANCELLED | OUTPATIENT
Start: 2022-09-28 | End: 2022-08-23

## 2022-08-23 NOTE — PATIENT INSTRUCTIONS
Presents after recent CT angiogram of the head and neck revealed an approximate 90% right internal carotid artery stenosis  In the past her carotid Dopplers seem to depict a tighter stenosis on the left however the CT scan does not correlate with that study  She has thrombotic plaque on the right and we had a complete discussion regarding recommending a right carotid endarterectomy and she is in agreement after potential symptoms of 1% stroke intraoperatively as well as bleeding infection however the benefits of the surgery outweigh the risks  She has no cardiac history however at her age we are requesting an ambulatory consult for cardiac risk assessment      Plan:  After cardiac risk assessment has been obtained planning a right carotid endarterectomy with bovine patch angioplasty and intraoperative completion duplex imaging to be performed at [place] [id number]

## 2022-08-23 NOTE — PROGRESS NOTES
Assessment/Plan:    Presents after recent CT angiogram of the head and neck revealed an approximate 90% right internal carotid artery stenosis  In the past her carotid Dopplers seem to depict a tighter stenosis on the left however the CT scan does not correlate with that study  She has thrombotic plaque on the right and we had a complete discussion regarding recommending a right carotid endarterectomy and she is in agreement after potential symptoms of 1% stroke intraoperatively as well as bleeding infection however the benefits of the surgery outweigh the risks  She has no cardiac history however at her age we are requesting an ambulatory consult for cardiac risk assessment  Plan:  After cardiac risk assessment has been obtained planning a right carotid endarterectomy with bovine patch angioplasty and intraoperative completion duplex imaging to be performed at [place] [address]     Diagnoses and all orders for this visit:    Carotid stenosis, bilateral  -     Case request operating room: ENDARTERECTOMY ARTERY CAROTID on the right with bovine patch angioplasty completion duplex imaging; Standing  -     PAT Covid Screening; Future  -     Basic metabolic panel; Future  -     CBC and Platelet; Future  -     Ambulatory referral to Cardiology; Future  -     Case request operating room: ENDARTERECTOMY ARTERY CAROTID on the right with bovine patch angioplasty completion duplex imaging    Other orders  -     Diet NPO; Sips with meds; Standing  -     Void on call to OR; Standing  -     Insert peripheral IV; Standing  -     Nursing Communication CHG bath, have staff wash entire body (neck down) per pre op bathing protocol  Routine, evening prior to, and day of surgery ; Standing  -     Nursing Communication Swab both nares with Povidone-Iodine solution, EXCLUDE if patient has shellfish/Iodine allergy   Routine, day of surgery, on call to OR ; Standing  -     chlorhexidine (PERIDEX) 0 12 % oral rinse 15 mL  - Place sequential compression device; Standing  -     ceFAZolin (ANCEF) IVPB (premix in dextrose) 2,000 mg 50 mL        Subjective:      Patient ID: Kris De Jesus is a 80 y o  female  Pt is here to rev CTA head and neck 8/4/22, AOIL, CV and MIKAEL 7/26/22  Pt c/o pain in R knee down to her calf when sitting down and walking 1 block  Pt says its worse when going incline  Pt denies TIA stroke symptoms  Pt is taking ASA, Plavix and Simvastatin  Pt is a smoker  HPI    The following portions of the patient's history were reviewed and updated as appropriate: allergies, current medications, past family history, past medical history, past social history, past surgical history and problem list     Review of Systems   Constitutional: Negative  HENT: Negative  Eyes: Negative  Respiratory: Negative  Cardiovascular: Negative  Gastrointestinal: Negative  Endocrine: Negative  Genitourinary: Negative  Musculoskeletal: Negative  Pain in R knee and calf   Skin: Negative  Allergic/Immunologic: Negative  Neurological: Negative  Hematological: Negative  Psychiatric/Behavioral: Negative  Objective: There were no vitals taken for this visit  Physical Exam      Oriented x3 no evidence of clinical depression  Eyes:  Sclera non-icteric    Skin: normal without evidence of inflammation    Neck is supple carotid pulses equal bilaterally right bruit heard    Chest lungs clear, heart regular rhythm  Abdomen soft nontender no evidence of pulsatile masses  Pulses are palpable bilateral Femoral      Neurological exam intact cranial nerves 2-12 grossly intact no gross motor sensory deficits detected        Imaging viewed and reviewed with Patient    Operative Scheduling Information:    Hospital[facility]    Physician:  Me    Surgery:  Right carotid endarterectomy with bovine patch angioplasty intraoperative completion duplex imaging    Urgency:  Standard    Level:  Level 3: Outpatients to be scheduled for elective surgery than can be delayed up to 4 weeks without reasonable expectation of detriment to patient    Case Length:  Normal    Post-op Bed:  Stepdown    OR Table:  Standard    Equipment Needs:  Vascular technologist    Medication Instructions:  Aspirin:   Hold for 7 days prior to procedure  Plavix:  Hold for 10 days prior to procedure    Hydration:  No    Contrast Allergy:  no

## 2022-08-23 NOTE — TELEPHONE ENCOUNTER
REMINDER: Under Reason For Call, comments MUST be formatted as:   (Surgeon's Initials) / (Procedure)      Special Instructions / FYI: Equipment Needs:  Vascular technologist    Procedure: Right carotid endarterectomy with bovine patch angioplasty intraoperative completion duplex imaging    Level: 3 - Route clearance(s) to [facility] Clearance Pool     Allergies: Celecoxib, Other, Chocolate - food allergy, Cilostazol, Sutures  [suture], and Wound dressing adhesive    Instructions Given: NO Bowel Prep General Instructions     Dialysis: Patient is not on dialysis  Return Visit Required Prior to Procedure: No     Consent: I certify that patient has signed, printed, timed, and dated their surgery consent  I certify that the patient's LEGAL NAME and DATE OF BIRTH are written in the upper left corner on BOTH sides of the consent  I certify that BOTH sides of the completed surgery consent have been scanned into the patient's Epic chart by myself on 8/23/2022  Yes, I have LABELED the consent in Epic as Consent for Vascular Procedure  For Surgical Clearances     Levels   1-3   ROUTE this encounter to [facility] Clearance Pool (AND)   [facility] Surgery Coordinator Pool     Level   4   ROUTE this encounter to [facility] Surgery Coordinator Pool       HYDRATION CLEARANCES   ONLY ROUTE TO  [facility] Clearance Pool     (1) ONE CLEARANCE NEEDED - Cardiology  Clearance // Clearing Provider's Name (Jose Green): Dr Guilherme Lindo  //  Spoke with: scheduling   //  Office Contact Information P: [phone] - F: [phone]    Yes, I have ROUTED this encounter to [facility] Surgery Coordinator and/or [facility] Clearance Pool

## 2022-08-23 NOTE — ASSESSMENT & PLAN NOTE
Presents after recent CT angiogram of the head and neck revealed an approximate 90% right internal carotid artery stenosis  In the past her carotid Dopplers seem to depict a tighter stenosis on the left however the CT scan does not correlate with that study  She has thrombotic plaque on the right and we had a complete discussion regarding recommending a right carotid endarterectomy and she is in agreement after potential symptoms of 1% stroke intraoperatively as well as bleeding infection however the benefits of the surgery outweigh the risks  She has no cardiac history however at her age we are requesting an ambulatory consult for cardiac risk assessment      Plan:  After cardiac risk assessment has been obtained planning a right carotid endarterectomy with bovine patch angioplasty and intraoperative completion duplex imaging to be performed at [place] [address]

## 2022-08-23 NOTE — LETTER
August 23, 2022     Becky Weber, [address] [facility],[address]  [address] [place] [address]    Patient: Tracy Primus   YOB: 1940   Date of Visit: 8/23/2022       Dear Dr Resendiz Labs: Thank you for referring Sharon Maier to me for evaluation  Below are my notes for this consultation  If you have questions, please do not hesitate to call me  I look forward to following your patient along with you           Sincerely,        Yael Hull MD        CC: No Recipients

## 2022-08-26 ENCOUNTER — DOCUMENTATION (OUTPATIENT)
Dept: CARDIOLOGY CLINIC | Facility: CLINIC | Age: 82
End: 2022-08-26

## 2022-09-18 PROBLEM — Z01.810 PREOPERATIVE CARDIOVASCULAR EXAMINATION: Status: ACTIVE | Noted: 2022-09-18

## 2022-09-20 ENCOUNTER — CONSULT (OUTPATIENT)
Dept: CARDIOLOGY CLINIC | Facility: CLINIC | Age: 82
End: 2022-09-20
Payer: COMMERCIAL

## 2022-09-20 VITALS
DIASTOLIC BLOOD PRESSURE: 84 MMHG | HEIGHT: 61 IN | WEIGHT: 138.2 LBS | SYSTOLIC BLOOD PRESSURE: 148 MMHG | BODY MASS INDEX: 26.09 KG/M2 | HEART RATE: 64 BPM

## 2022-09-20 DIAGNOSIS — F17.200 SMOKING: ICD-10-CM

## 2022-09-20 DIAGNOSIS — I65.23 CAROTID STENOSIS, BILATERAL: ICD-10-CM

## 2022-09-20 DIAGNOSIS — Z01.810 PREOPERATIVE CARDIOVASCULAR EXAMINATION: Primary | ICD-10-CM

## 2022-09-20 DIAGNOSIS — I77.1 ILIAC ARTERY STENOSIS, BILATERAL (HCC): ICD-10-CM

## 2022-09-20 DIAGNOSIS — I77.9 PERIPHERAL ARTERIAL OCCLUSIVE DISEASE (HCC): ICD-10-CM

## 2022-09-20 DIAGNOSIS — J44.9 CHRONIC OBSTRUCTIVE PULMONARY DISEASE, UNSPECIFIED COPD TYPE (HCC): ICD-10-CM

## 2022-09-20 DIAGNOSIS — I70.211 ATHEROSCLEROSIS OF NATIVE ARTERY OF RIGHT LOWER EXTREMITY WITH INTERMITTENT CLAUDICATION (HCC): ICD-10-CM

## 2022-09-20 DIAGNOSIS — E78.00 PURE HYPERCHOLESTEROLEMIA: ICD-10-CM

## 2022-09-20 DIAGNOSIS — N18.30 STAGE 3 CHRONIC KIDNEY DISEASE, UNSPECIFIED WHETHER STAGE 3A OR 3B CKD (HCC): ICD-10-CM

## 2022-09-20 PROCEDURE — 99204 OFFICE O/P NEW MOD 45 MIN: CPT | Performed by: INTERNAL MEDICINE

## 2022-09-20 PROCEDURE — 93000 ELECTROCARDIOGRAM COMPLETE: CPT | Performed by: INTERNAL MEDICINE

## 2022-09-20 NOTE — H&P (VIEW-ONLY)
[facility]  [address] [facility]   [id number]  [id number]  Phone#  [phone]   Fax#  [phone]  *-*-*-*-*-*-*-*-*-*-*-*-*-*-*-*-*-*-*-*-*-*-*-*-*-*-*-*-*-*-*-*-*-*-*-*-*-*-*-*-*-*-*-*-*-*-*-*-*-*-*-*-*-*                                              Cardiology Consultation       ENCOUNTER DATE: 22 10:01 AM  PATIENT NAME: Chrissie Rinne   : 1940    MRN: [id number]  AGE:82 y o  SEX: female  [address] Sousa MD     PRIMARY CARE PHYSICIAN: Nusrat Miramontes DO    ACTIVE DIAGNOSIS THIS VISIT  1  Preoperative cardiovascular examination  POCT ECG   2  Carotid stenosis, bilateral  Ambulatory referral to Cardiology   3  Pure hypercholesterolemia     4  Smoking     5  Peripheral arterial occlusive disease ([facility] [address] )     6  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 ([facility] [address] )     7  Chronic obstructive pulmonary disease, unspecified COPD type ([address] )     8   Iliac artery stenosis, bilateral (HCC)     9  Stage 3 chronic kidney disease, unspecified whether stage 3a or 3b CKD ([facility] [address] )       ACTIVE PROBLEM LIST  Patient Active Problem List   Diagnosis    Smoking    Peripheral arterial occlusive disease ([address] )    Proximal humerus fracture    Atherosclerosis of native arteries of extremities with intermittent claudication, unspecified extremity (HCC)    Chronic obstructive pulmonary disease, unspecified (HCC)    Degeneration of intervertebral disc    Hyperlipidemia    Iliac artery stenosis, bilateral (HCC)    Carotid stenosis, bilateral    Osteoarthritis    Osteopenia    Varicose veins of right lower extremity with pain    Vitamin D deficiency    Age-related nuclear cataract of both eyes    Open angle with borderline findings, low risk, bilateral    Disorder of arteries and arterioles, unspecified ([address] )    Stricture of artery (HCC)    Stage 3 chronic kidney disease, unspecified whether stage 3a or 3b CKD ([address] )    Fall    Scalp laceration    Embolism and thrombosis of arteries of the lower extremities (HCC)    Preoperative cardiovascular examination       CARDIOLOGY SPECIALTY COMMENTS  Preoperative risk assessment for right carotid endarterectomy under general anesthesia  08/04/2022 CTA of the head neck demonstrating 90% stenosis of the proximal right internal carotid artery and 50% stenosis of the proximal left internal carotid artery    HPI:       Patient is for preoperative evaluation for right carotid endarterectomy  She has no history of cardiac problems but does have significant vascular disease  Patient denies chest discomfort or shortness of breath  Patient has no palpitations  Patient denies symptoms of dizziness, lightheadedness or near-syncope/syncope  Patient denies leg edema  Patient denies symptoms of orthopnea or paroxysmal nocturnal dyspnea  She is able to do all her housework  She does have lower extremity claudication and back pain  She paces herself and is able to do all of her housework  She does state that if she would really rush, she would get mild shortness of breath  She has smoked since age 12  She used to smoke a pack a day but has cut back significantly  Presently she smokes 5-10 cigarettes daily  She does have a chronic cough from smoking      DISCUSSION/PLAN:         · Recommend proceeding with surgery  · Patient a moderate to moderately high cardiovascular risk but with the degree of stenosis in her right internal carotid artery, the benefits clearly appear to outweigh the risks  · Patient not given a return but would be glad to see the patient in the future if the need arises      Lab Studies:    Lab Results   Component Value Date    CHOLESTEROL 175 12/03/2021    CHOLESTEROL 130 11/19/2020    CHOLESTEROL 159 10/29/2019     Lab Results   Component Value Date    TRIG 52 12/03/2021    TRIG 79 11/19/2020    TRIG 69 10/29/2019     Lab Results   Component Value Date    HDL 84 12/03/2021    HDL 50 11/19/2020    HDL 67 10/29/2019     Lab Results   Component Value Date    LDLCALC 81 12/03/2021    LDLCALC 64 11/19/2020    LDLCALC 77 10/29/2019     Lab Results   Component Value Date    NONHDL 80 11/19/2020    NONHDL 92 10/29/2019    NONHDL 113 12/13/2018       Lab Results   Component Value Date    EGFR 64 07/28/2022    EGFR 55 12/03/2021    EGFR 65 08/19/2019    SODIUM 138 07/28/2022    SODIUM 142 12/03/2021    SODIUM 141 11/19/2020    K 4 6 07/28/2022    K 4 0 12/03/2021    K 4 6 11/19/2020     (H) 07/28/2022     12/03/2021     11/19/2020    CO2 28 07/28/2022    CO2 27 01/30/2022    CO2 28 12/03/2021    ANIONGAP 8 09/24/2015    ANIONGAP 10 02/25/2015    ANIONGAP 5 01/23/2015    BUN 14 07/28/2022    BUN 14 12/03/2021    BUN 22 11/19/2020    CREATININE 0 85 07/28/2022    CREATININE 0 97 12/03/2021    CREATININE 0 99 (H) 11/19/2020     Lab Results   Component Value Date    WBC 7 27 12/03/2021    WBC 7 6 11/19/2020    WBC 7 0 10/29/2019    HGB 13 3 01/30/2022    HGB 13 9 12/03/2021    HGB 14 4 11/19/2020    HCT 39 01/30/2022    HCT 42 2 12/03/2021    HCT 42 4 11/19/2020    MCV 99 (H) 12/03/2021     7 (H) 11/19/2020     9 (H) 10/29/2019    MCH 32 6 12/03/2021    MCH 34 2 (H) 11/19/2020    MCH 34 9 (H) 10/29/2019    MCHC 32 9 12/03/2021    MCHC 34 0 11/19/2020    MCHC 33 3 10/29/2019     12/03/2021     11/19/2020     10/29/2019        Lab Results   Component Value Date    GLUCOSE 86 01/30/2022    GLUCOSE 86 09/24/2015    GLUCOSE 84 02/25/2015    CALCIUM 9 7 07/28/2022    CALCIUM 9 1 12/03/2021    CALCIUM 9 5 11/19/2020    AST 18 12/03/2021    AST 14 11/19/2020    AST 15 10/29/2019    ALT 20 12/03/2021    ALT 13 11/19/2020    ALT 15 10/29/2019    ALKPHOS 85 12/03/2021    ALKPHOS 63 11/19/2020    ALKPHOS 73 10/29/2019    PROT 7 7 09/24/2015    PROT 7 5 09/04/2014    PROT 7 5 02/25/2014    BILITOT 1 54 (H) 09/24/2015    BILITOT 1 35 (H) 09/04/2014    BILITOT 1 07 (H) 02/25/2014       Results for orders placed or performed in visit on 09/20/22   POCT ECG    Narrative    Normal sinus rhythm at a rate of 64 beats per minute  Lead V3 he is poorly placed  Normal EKG           Current Outpatient Medications:     aspirin 81 MG tablet, Take 81 mg by mouth daily, Disp: , Rfl:     Calcium Citrate-Vitamin D (CALCIUM + D PO), Take 2 tablets by mouth daily, Disp: , Rfl:     clopidogrel (PLAVIX) 75 mg tablet, Take 1 tablet (75 mg total) by mouth daily, Disp: 90 tablet, Rfl: 0    simvastatin (ZOCOR) 40 mg tablet, Take 1 tablet (40 mg total) by mouth daily at bedtime, Disp: 90 tablet, Rfl: 1  Allergies   Allergen Reactions    Celecoxib GI Intolerance    Other Rash     Adhesive tape    Chocolate - Food Allergy     Cilostazol Edema     rash    Sutures  [Suture]     Wound Dressing Adhesive      Other reaction(s): Itching       Past Medical History:   Diagnosis Date    Food allergy     chocholate    Hyperlipidemia     Peripheral arterial occlusive disease ([address] )     Right humeral fracture     right poximal    Smoking     Wears dentures     full upper    Wears glasses      Social History     Socioeconomic History    Marital status:      Spouse name: Not on file    Number of children: Not on file    Years of education: Not on file    Highest education level: Not on file   Occupational History    Not on file   Tobacco Use    Smoking status: Current Every Day Smoker     Packs/day: 0 50     Years: 64 00     Pack years: 32 00     Types: Cigarettes    Smokeless tobacco: Never Used   Vaping Use    Vaping Use: Never used   Substance and Sexual Activity    Alcohol use: Not Currently     Comment: quit 3 05/2020    Drug use: No    Sexual activity: Not Currently   Other Topics Concern    Not on file   Social History Narrative    Not on file     Social Determinants of Health     Financial Resource Strain: Not on file   Food Insecurity: Not on file   Transportation Needs: Not on file   Physical Activity: Not on file   Stress: Not on file   Social Connections: Not on file   Intimate Partner Violence: Not on file   Housing Stability: Not on file      Family History   Problem Relation Age of Onset    No Known Problems Mother     No Known Problems Father      Past Surgical History:   Procedure Laterality Date    APPENDECTOMY      BREAST SURGERY      gilberto    COLONOSCOPY      HEMORRHOID SURGERY      IR AORTAGRAM WITH RUN-OFF  8/26/2019    MO RECONSTR TOTAL SHOULDER IMPLANT Right 12/8/2016    Procedure: ARTHROPLASTY SHOULDER REVERSE;  Surgeon: Fariba Santoyo MD;  Location: [facility];  Service: Orthopedics    THROMBOENDARTERECTOMY      TONSILLECTOMY         Review of Systems:  Review of Systems   Constitutional: Negative  HENT: Negative  Respiratory: Negative for chest tightness and shortness of breath  Cardiovascular: Negative for chest pain and leg swelling  Gastrointestinal: Negative  Endocrine: Negative  Genitourinary: Negative  Musculoskeletal: Positive for back pain  Lower extremity claudication   Skin: Negative  Allergic/Immunologic: Negative  Neurological: Negative  Hematological: Negative  Psychiatric/Behavioral: Negative  Physical Exam:  /84 (BP Location: Left arm, Patient Position: Sitting, Cuff Size: Adult)   Pulse 64   Ht 5' 1" (1 549 m)   Wt 62 7 kg (138 lb 3 2 oz)   BMI 26 11 kg/m²     PREVIOUS WEIGHTS:   Wt Readings from Last 10 Encounters:   09/20/22 62 7 kg (138 lb 3 2 oz)   08/23/22 62 8 kg (138 lb 6 4 oz)   05/31/22 60 8 kg (134 lb)   02/08/22 62 1 kg (136 lb 12 8 oz)   01/25/22 60 8 kg (134 lb)   11/30/21 62 6 kg (138 lb)   11/11/21 63 5 kg (140 lb)   11/19/20 63 5 kg (140 lb)   11/05/20 63 5 kg (140 lb)   08/14/20 66 2 kg (146 lb)      Physical Exam  Constitutional:       General: She is not in acute distress  Appearance: She is well-developed  HENT:      Head: Normocephalic and atraumatic     Neck: Thyroid: No thyromegaly  Vascular: No carotid bruit or JVD  Trachea: No tracheal deviation  Cardiovascular:      Rate and Rhythm: Normal rate and regular rhythm  Pulses: Normal pulses  Heart sounds: Normal heart sounds  No murmur heard  No friction rub  No gallop  Pulmonary:      Effort: Pulmonary effort is normal  No respiratory distress  Breath sounds: Normal breath sounds  No wheezing, rhonchi or rales  Chest:      Chest wall: No tenderness  Abdominal:      General: Bowel sounds are normal  There is no distension  Palpations: Abdomen is soft  Tenderness: There is no abdominal tenderness  Musculoskeletal:         General: Normal range of motion  Cervical back: Normal range of motion and neck supple  Right lower leg: No edema  Left lower leg: No edema  Skin:     General: Skin is warm and dry  Neurological:      General: No focal deficit present  Mental Status: She is alert and oriented to person, place, and time  Gait: Gait normal    Psychiatric:         Mood and Affect: Mood normal          Behavior: Behavior normal          Thought Content: Thought content normal          Judgment: Judgment normal      ---------------------------------------------------------------------------------  CAROTIDS  Results for orders placed during the hospital encounter of 07/26/22    VAS carotid complete study    Narrative  [facility] REPORT  CLINICAL:  Operative History:  2019-08-19 Right Common iliac artery PTA/stent  2015-02-26 Right Wound incision & debridement  2015-01-22 Right Common Femoral endarterectomy, patch closure  2015-01-22 Right Other infrainguinal endarterectomy  2015-01-22 Right Sfa endarterectomy, patch closure  2009-07-09 Transluminal placement of iliac stent, percutaneous  Risk Factors  The patient has history of Hyperlipidemia, PAD and smoking (current) 1-2 ppd      FINDINGS:    Right        Impression  PSV  EDV (cm/s) Direction of Flow  Ratio  Dist  ICA                 59          18                      0 94  Mid  ICA                  67          13                      1 07  Prox  ICA    1 - 49%      85           7                      1 36  Dist CCA                  56          13  Mid CCA                   62           9                      0 93  Prox CCA                  67           5                      1 29  Ext Carotid              294          31                      4 72  Prox Vert                 44          11  Antegrade  Subclavian               127           0  Innominate                52           4    Left         Impression  PSV  EDV (cm/s)  Direction of Flow  Ratio  Dist  ICA                 40          13                      0 71  Mid  ICA                  63          18                      1 14  Prox  ICA    70 - 99%    376         121                      6 76  Dist CCA                  50          13  Mid CCA                   56          13                      0 81  Prox CCA                  69          11  Ext Carotid               89           0                      1 60  Prox Vert                 41          14  Antegrade  Subclavian               132          13        CONCLUSION:    Impression  RIGHT:  There is <50% stenosis noted in the internal carotid artery  Plaque is  heterogenous, calcified and irregular  Vertebral artery flow is antegrade  There is no significant subclavian artery  disease  LEFT:  There is 70-99% stenosis noted in the internal carotid artery  Plaque is  heterogenous, calcified and irregular  Vertebral artery flow is antegrade  There is no significant subclavian artery  disease  Technically difficult study due to thick plaque and acoustic shadowing,  possibly obscuring higher velocities  Technical findings [facility] [address], 7/26/2022 @1405      Compared to previous study on 1203/2021, there is progression of disease in the  left internal carotid artery  Recommend repeat testing in 6month as per protocol unless otherwise indicated  Internal carotid artery stenosis determination by consensus criteria from:  Trevin Argueta et al  Carotid Artery Stenosis: Gray-Scale and Doppler US Diagnosis  - Society of Radiologists in [facility] [address], Radiology 2003;  [id number]  SIGNATURE:  Electronically Signed by: Graciela Reynoso MD, RPVI on 2022-07-26 04:03:07 PM       VAS abdominal aorta/iliacs; complete study    Narrative  [facility] REPORT  CLINICAL:  Indications:  Evaluate for progression of Aorto-Iliac occlusive disease s/p revascularization  (8/19/2019)  Patient is asymptomatic at this time  Operative History:  2019-08-19 Right Common iliac artery PTA/stent  2015-02-26 Right Wound incision & debridement  2015-01-22 Right Common Femoral endarterectomy, patch closure  2015-01-22 Right Other infrainguinal endarterectomy  2015-01-22 Right Sfa endarterectomy, patch closure  2009-07-09 Transluminal placement of iliac stent, percutaneous  Risk Factors  The patient has history of Hyperlipidemia, PAD and smoking (current) 1-2 ppd  Clinical  Right Pressure:  145/ mm Hg, Left Pressure:  146/ mm Hg  FINDINGS:    Unilateral     Impression  PSV (cm/s)  EDV (cm/s)  AP (cm)  Sup-Ariane Ao                         71           4      1 5  Px Inf-Ajtinder Ao                      64           2      1 4  Ds Inf-Jatinder Ao                     117          11      1 3  Celiac         >70%               217          25  Prox  SMA                         175          27    Right             PSV (cm/s)  EDV (cm/s)  AP (cm)   RAR  Prox ANDREW - Stent         155          10      0 8  Dist ANDREW - Stent         283          28      0 7  Prox   EIA                129          18      0 8  Dist EIA                 137           0      0 8  Prox Renal               171          27           2 41    Left              PSV (cm/s)  EDV (cm/s)  AP (cm)   RAR  Prox ANDREW - Stent 112           4      1 0  Dist ANDREW - Stent         114           6      0 9  Prox  EIA                136           0      1 0  Dist EIA                 239          29      1 0  Prox Renal               144          32           2 02        CONCLUSION:    Impression  The aorta is patent and normal in caliber measuring 1 5 cm in its greatest  diameter  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  SMA and bilateral renal arteries are patent and of normal caliber  Patent common iliac artery stents bilaterally  Ankle/Brachial indices are: Right- 0 81 (Prior 0 87 ) and Left- 0 81 (Prior  0 78 )  Great toe pressures are within the healing range  PVR/ PPG tracings are dampened  Technically difficult study due to thick calcific plaque possibly hiding higher  velocities  Compared to previous study on 6/16/2020 , there is no significant change  SIGNATURE:  Electronically Signed by: Dawson Aden MD, RPVI on 2022-07-26 04:01:51 PM          ======================================================   I have personally reviewed pertinent reports  Portions of the record may have been created with voice recognition software  Occasional wrong word or "sound a like" substitutions may have occurred due to the inherent limitations of voice recognition software  Read the chart carefully and recognize, using context, where substitutions have occurred      SIGNATURES:   Sean Maier MD

## 2022-09-20 NOTE — PROGRESS NOTES
[facility]  [id number] [facility]   [address]  [id number]  Phone#  [phone]   Fax#  [phone]  *-*-*-*-*-*-*-*-*-*-*-*-*-*-*-*-*-*-*-*-*-*-*-*-*-*-*-*-*-*-*-*-*-*-*-*-*-*-*-*-*-*-*-*-*-*-*-*-*-*-*-*-*-*                                              Cardiology Consultation       ENCOUNTER DATE: 22 10:01 AM  PATIENT NAME: Emmanuel Peralta   : 1940    MRN: [id number]  AGE:82 y o  SEX: female  [id number]ousa MD     PRIMARY CARE PHYSICIAN: Feli Youngblood DO    ACTIVE DIAGNOSIS THIS VISIT  1  Preoperative cardiovascular examination  POCT ECG   2  Carotid stenosis, bilateral  Ambulatory referral to Cardiology   3  Pure hypercholesterolemia     4  Smoking     5  Peripheral arterial occlusive disease ([address] )     6  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 ([address] )     7  Chronic obstructive pulmonary disease, unspecified COPD type ([address] )     8   Iliac artery stenosis, bilateral (HCC)     9  Stage 3 chronic kidney disease, unspecified whether stage 3a or 3b CKD ([address] )       ACTIVE PROBLEM LIST  Patient Active Problem List   Diagnosis    Smoking    Peripheral arterial occlusive disease ([address] )    Proximal humerus fracture    Atherosclerosis of native arteries of extremities with intermittent claudication, unspecified extremity (HCC)    Chronic obstructive pulmonary disease, unspecified (HCC)    Degeneration of intervertebral disc    Hyperlipidemia    Iliac artery stenosis, bilateral (HCC)    Carotid stenosis, bilateral    Osteoarthritis    Osteopenia    Varicose veins of right lower extremity with pain    Vitamin D deficiency    Age-related nuclear cataract of both eyes    Open angle with borderline findings, low risk, bilateral    Disorder of arteries and arterioles, unspecified ([address] )    Stricture of artery (HCC)    Stage 3 chronic kidney disease, unspecified whether stage 3a or 3b CKD ([address] )    Fall    Scalp laceration    Embolism and thrombosis of arteries of the lower extremities (HCC)    Preoperative cardiovascular examination       CARDIOLOGY SPECIALTY COMMENTS  Preoperative risk assessment for right carotid endarterectomy under general anesthesia  08/04/2022 CTA of the head neck demonstrating 90% stenosis of the proximal right internal carotid artery and 50% stenosis of the proximal left internal carotid artery    HPI:       Patient is for preoperative evaluation for right carotid endarterectomy  She has no history of cardiac problems but does have significant vascular disease  Patient denies chest discomfort or shortness of breath  Patient has no palpitations  Patient denies symptoms of dizziness, lightheadedness or near-syncope/syncope  Patient denies leg edema  Patient denies symptoms of orthopnea or paroxysmal nocturnal dyspnea  She is able to do all her housework  She does have lower extremity claudication and back pain  She paces herself and is able to do all of her housework  She does state that if she would really rush, she would get mild shortness of breath  She has smoked since age 12  She used to smoke a pack a day but has cut back significantly  Presently she smokes 5-10 cigarettes daily  She does have a chronic cough from smoking      DISCUSSION/PLAN:         · Recommend proceeding with surgery  · Patient a moderate to moderately high cardiovascular risk but with the degree of stenosis in her right internal carotid artery, the benefits clearly appear to outweigh the risks  · Patient not given a return but would be glad to see the patient in the future if the need arises      Lab Studies:    Lab Results   Component Value Date    CHOLESTEROL 175 12/03/2021    CHOLESTEROL 130 11/19/2020    CHOLESTEROL 159 10/29/2019     Lab Results   Component Value Date    TRIG 52 12/03/2021    TRIG 79 11/19/2020    TRIG 69 10/29/2019     Lab Results   Component Value Date    HDL 84 12/03/2021    HDL 50 11/19/2020    HDL 67 10/29/2019     Lab Results   Component Value Date    LDLCALC 81 12/03/2021    LDLCALC 64 11/19/2020    LDLCALC 77 10/29/2019     Lab Results   Component Value Date    NONHDL 80 11/19/2020    NONHDL 92 10/29/2019    NONHDL 113 12/13/2018       Lab Results   Component Value Date    EGFR 64 07/28/2022    EGFR 55 12/03/2021    EGFR 65 08/19/2019    SODIUM 138 07/28/2022    SODIUM 142 12/03/2021    SODIUM 141 11/19/2020    K 4 6 07/28/2022    K 4 0 12/03/2021    K 4 6 11/19/2020     (H) 07/28/2022     12/03/2021     11/19/2020    CO2 28 07/28/2022    CO2 27 01/30/2022    CO2 28 12/03/2021    ANIONGAP 8 09/24/2015    ANIONGAP 10 02/25/2015    ANIONGAP 5 01/23/2015    BUN 14 07/28/2022    BUN 14 12/03/2021    BUN 22 11/19/2020    CREATININE 0 85 07/28/2022    CREATININE 0 97 12/03/2021    CREATININE 0 99 (H) 11/19/2020     Lab Results   Component Value Date    WBC 7 27 12/03/2021    WBC 7 6 11/19/2020    WBC 7 0 10/29/2019    HGB 13 3 01/30/2022    HGB 13 9 12/03/2021    HGB 14 4 11/19/2020    HCT 39 01/30/2022    HCT 42 2 12/03/2021    HCT 42 4 11/19/2020    MCV 99 (H) 12/03/2021     7 (H) 11/19/2020     9 (H) 10/29/2019    MCH 32 6 12/03/2021    MCH 34 2 (H) 11/19/2020    MCH 34 9 (H) 10/29/2019    MCHC 32 9 12/03/2021    MCHC 34 0 11/19/2020    MCHC 33 3 10/29/2019     12/03/2021     11/19/2020     10/29/2019        Lab Results   Component Value Date    GLUCOSE 86 01/30/2022    GLUCOSE 86 09/24/2015    GLUCOSE 84 02/25/2015    CALCIUM 9 7 07/28/2022    CALCIUM 9 1 12/03/2021    CALCIUM 9 5 11/19/2020    AST 18 12/03/2021    AST 14 11/19/2020    AST 15 10/29/2019    ALT 20 12/03/2021    ALT 13 11/19/2020    ALT 15 10/29/2019    ALKPHOS 85 12/03/2021    ALKPHOS 63 11/19/2020    ALKPHOS 73 10/29/2019    PROT 7 7 09/24/2015    PROT 7 5 09/04/2014    PROT 7 5 02/25/2014    BILITOT 1 54 (H) 09/24/2015    BILITOT 1 35 (H) 09/04/2014    BILITOT 1 07 (H) 02/25/2014       Results for orders placed or performed in visit on 09/20/22   POCT ECG    Narrative    Normal sinus rhythm at a rate of 64 beats per minute  Lead V3 he is poorly placed  Normal EKG           Current Outpatient Medications:     aspirin 81 MG tablet, Take 81 mg by mouth daily, Disp: , Rfl:     Calcium Citrate-Vitamin D (CALCIUM + D PO), Take 2 tablets by mouth daily, Disp: , Rfl:     clopidogrel (PLAVIX) 75 mg tablet, Take 1 tablet (75 mg total) by mouth daily, Disp: 90 tablet, Rfl: 0    simvastatin (ZOCOR) 40 mg tablet, Take 1 tablet (40 mg total) by mouth daily at bedtime, Disp: 90 tablet, Rfl: 1  Allergies   Allergen Reactions    Celecoxib GI Intolerance    Other Rash     Adhesive tape    Chocolate - Food Allergy     Cilostazol Edema     rash    Sutures  [Suture]     Wound Dressing Adhesive      Other reaction(s): Itching       Past Medical History:   Diagnosis Date    Food allergy     chocholate    Hyperlipidemia     Peripheral arterial occlusive disease ([address] )     Right humeral fracture     right poximal    Smoking     Wears dentures     full upper    Wears glasses      Social History     Socioeconomic History    Marital status:      Spouse name: Not on file    Number of children: Not on file    Years of education: Not on file    Highest education level: Not on file   Occupational History    Not on file   Tobacco Use    Smoking status: Current Every Day Smoker     Packs/day: 0 50     Years: 64 00     Pack years: 32 00     Types: Cigarettes    Smokeless tobacco: Never Used   Vaping Use    Vaping Use: Never used   Substance and Sexual Activity    Alcohol use: Not Currently     Comment: quit 3 05/2020    Drug use: No    Sexual activity: Not Currently   Other Topics Concern    Not on file   Social History Narrative    Not on file     Social Determinants of Health     Financial Resource Strain: Not on file   Food Insecurity: Not on file   Transportation Needs: Not on file   Physical Activity: Not on file   Stress: Not on file   Social Connections: Not on file   Intimate Partner Violence: Not on file   Housing Stability: Not on file      Family History   Problem Relation Age of Onset    No Known Problems Mother     No Known Problems Father      Past Surgical History:   Procedure Laterality Date    APPENDECTOMY      BREAST SURGERY      gilberto    COLONOSCOPY      HEMORRHOID SURGERY      IR AORTAGRAM WITH RUN-OFF  8/26/2019    OR RECONSTR TOTAL SHOULDER IMPLANT Right 12/8/2016    Procedure: ARTHROPLASTY SHOULDER REVERSE;  Surgeon: Valencia Craven MD;  Location: [facility];  Service: Orthopedics    THROMBOENDARTERECTOMY      TONSILLECTOMY         Review of Systems:  Review of Systems   Constitutional: Negative  HENT: Negative  Respiratory: Negative for chest tightness and shortness of breath  Cardiovascular: Negative for chest pain and leg swelling  Gastrointestinal: Negative  Endocrine: Negative  Genitourinary: Negative  Musculoskeletal: Positive for back pain  Lower extremity claudication   Skin: Negative  Allergic/Immunologic: Negative  Neurological: Negative  Hematological: Negative  Psychiatric/Behavioral: Negative  Physical Exam:  /84 (BP Location: Left arm, Patient Position: Sitting, Cuff Size: Adult)   Pulse 64   Ht 5' 1" (1 549 m)   Wt 62 7 kg (138 lb 3 2 oz)   BMI 26 11 kg/m²     PREVIOUS WEIGHTS:   Wt Readings from Last 10 Encounters:   09/20/22 62 7 kg (138 lb 3 2 oz)   08/23/22 62 8 kg (138 lb 6 4 oz)   05/31/22 60 8 kg (134 lb)   02/08/22 62 1 kg (136 lb 12 8 oz)   01/25/22 60 8 kg (134 lb)   11/30/21 62 6 kg (138 lb)   11/11/21 63 5 kg (140 lb)   11/19/20 63 5 kg (140 lb)   11/05/20 63 5 kg (140 lb)   08/14/20 66 2 kg (146 lb)      Physical Exam  Constitutional:       General: She is not in acute distress  Appearance: She is well-developed  HENT:      Head: Normocephalic and atraumatic     Neck: Thyroid: No thyromegaly  Vascular: No carotid bruit or JVD  Trachea: No tracheal deviation  Cardiovascular:      Rate and Rhythm: Normal rate and regular rhythm  Pulses: Normal pulses  Heart sounds: Normal heart sounds  No murmur heard  No friction rub  No gallop  Pulmonary:      Effort: Pulmonary effort is normal  No respiratory distress  Breath sounds: Normal breath sounds  No wheezing, rhonchi or rales  Chest:      Chest wall: No tenderness  Abdominal:      General: Bowel sounds are normal  There is no distension  Palpations: Abdomen is soft  Tenderness: There is no abdominal tenderness  Musculoskeletal:         General: Normal range of motion  Cervical back: Normal range of motion and neck supple  Right lower leg: No edema  Left lower leg: No edema  Skin:     General: Skin is warm and dry  Neurological:      General: No focal deficit present  Mental Status: She is alert and oriented to person, place, and time  Gait: Gait normal    Psychiatric:         Mood and Affect: Mood normal          Behavior: Behavior normal          Thought Content: Thought content normal          Judgment: Judgment normal      ---------------------------------------------------------------------------------  CAROTIDS  Results for orders placed during the hospital encounter of 07/26/22    VAS carotid complete study    Narrative  [facility] REPORT  CLINICAL:  Operative History:  2019-08-19 Right Common iliac artery PTA/stent  2015-02-26 Right Wound incision & debridement  2015-01-22 Right Common Femoral endarterectomy, patch closure  2015-01-22 Right Other infrainguinal endarterectomy  2015-01-22 Right Sfa endarterectomy, patch closure  2009-07-09 Transluminal placement of iliac stent, percutaneous  Risk Factors  The patient has history of Hyperlipidemia, PAD and smoking (current) 1-2 ppd      FINDINGS:    Right        Impression  PSV  EDV (cm/s) Direction of Flow  Ratio  Dist  ICA                 59          18                      0 94  Mid  ICA                  67          13                      1 07  Prox  ICA    1 - 49%      85           7                      1 36  Dist CCA                  56          13  Mid CCA                   62           9                      0 93  Prox CCA                  67           5                      1 29  Ext Carotid              294          31                      4 72  Prox Vert                 44          11  Antegrade  Subclavian               127           0  Innominate                52           4    Left         Impression  PSV  EDV (cm/s)  Direction of Flow  Ratio  Dist  ICA                 40          13                      0 71  Mid  ICA                  63          18                      1 14  Prox  ICA    70 - 99%    376         121                      6 76  Dist CCA                  50          13  Mid CCA                   56          13                      0 81  Prox CCA                  69          11  Ext Carotid               89           0                      1 60  Prox Vert                 41          14  Antegrade  Subclavian               132          13        CONCLUSION:    Impression  RIGHT:  There is <50% stenosis noted in the internal carotid artery  Plaque is  heterogenous, calcified and irregular  Vertebral artery flow is antegrade  There is no significant subclavian artery  disease  LEFT:  There is 70-99% stenosis noted in the internal carotid artery  Plaque is  heterogenous, calcified and irregular  Vertebral artery flow is antegrade  There is no significant subclavian artery  disease  Technically difficult study due to thick plaque and acoustic shadowing,  possibly obscuring higher velocities  Technical findings given to Cecy Postal at [facility], 7/26/2022 @1405      Compared to previous study on 1203/2021, there is progression of disease in the  left internal carotid artery  Recommend repeat testing in 6month as per protocol unless otherwise indicated  Internal carotid artery stenosis determination by consensus criteria from:  Monique Hanley , et al  Carotid Artery Stenosis: Gray-Scale and Doppler US Diagnosis  - Society of Radiologists in [facility] [address], Radiology 2003;  [phone]  SIGNATURE:  Electronically Signed by: Marylen Bryant, MD, RPVI on 2022-07-26 04:03:07 PM       VAS abdominal aorta/iliacs; complete study    Narrative  [facility] REPORT  CLINICAL:  Indications:  Evaluate for progression of Aorto-Iliac occlusive disease s/p revascularization  (8/19/2019)  Patient is asymptomatic at this time  Operative History:  2019-08-19 Right Common iliac artery PTA/stent  2015-02-26 Right Wound incision & debridement  2015-01-22 Right Common Femoral endarterectomy, patch closure  2015-01-22 Right Other infrainguinal endarterectomy  2015-01-22 Right Sfa endarterectomy, patch closure  2009-07-09 Transluminal placement of iliac stent, percutaneous  Risk Factors  The patient has history of Hyperlipidemia, PAD and smoking (current) 1-2 ppd  Clinical  Right Pressure:  145/ mm Hg, Left Pressure:  146/ mm Hg  FINDINGS:    Unilateral     Impression  PSV (cm/s)  EDV (cm/s)  AP (cm)  Sup-Ariane Ao                         71           4      1 5  Px Inf-Jatinder Ao                      64           2      1 4  Ds Inf-Jatinder Ao                     117          11      1 3  Celiac         >70%               217          25  Prox  SMA                         175          27    Right             PSV (cm/s)  EDV (cm/s)  AP (cm)   RAR  Prox ANDREW - Stent         155          10      0 8  Dist ANDREW - Stent         283          28      0 7  Prox   EIA                129          18      0 8  Dist EIA                 137           0      0 8  Prox Renal               171          27           2 41    Left              PSV (cm/s)  EDV (cm/s)  AP (cm)   RAR  Prox ANDREW - Stent 112           4      1 0  Dist ANDREW - Stent         114           6      0 9  Prox  EIA                136           0      1 0  Dist EIA                 239          29      1 0  Prox Renal               144          32           2 02        CONCLUSION:    Impression  The aorta is patent and normal in caliber measuring 1 5 cm in its greatest  diameter  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  SMA and bilateral renal arteries are patent and of normal caliber  Patent common iliac artery stents bilaterally  Ankle/Brachial indices are: Right- 0 81 (Prior 0 87 ) and Left- 0 81 (Prior  0 78 )  Great toe pressures are within the healing range  PVR/ PPG tracings are dampened  Technically difficult study due to thick calcific plaque possibly hiding higher  velocities  Compared to previous study on 6/16/2020 , there is no significant change  SIGNATURE:  Electronically Signed by: Lindajo Alpers, MD, RPVI on 2022-07-26 04:01:51 PM          ======================================================   I have personally reviewed pertinent reports  Portions of the record may have been created with voice recognition software  Occasional wrong word or "sound a like" substitutions may have occurred due to the inherent limitations of voice recognition software  Read the chart carefully and recognize, using context, where substitutions have occurred      SIGNATURES:   Brooklyn Doe MD

## 2022-09-20 NOTE — LETTER
2022     Jacque Contreras MD  [address]  [place]  [address] [facility] [address]  [id number] [place] [address] [address] [address]    Patient: Len Moreno   YOB: 1940   Date of Visit: 2022       Dear Dr Anamika Locke: Thank you for referring Leola Patel to me for evaluation  Below are my notes for this consultation  If you have questions, please do not hesitate to call me  I look forward to following your patient along with you  Sincerely,        Bacilio Castellano MD        CC: No Recipients  Bacilio Castellano MD  2022 10:03 AM  Sign when Signing Visit     [facility]  [address] [place], [place] [address]an   [address]  [address]  Phone#  [phone]   Fax#  [phone]  *-*-*-*-*-*-*-*-*-*-*-*-*-*-*-*-*-*-*-*-*-*-*-*-*-*-*-*-*-*-*-*-*-*-*-*-*-*-*-*-*-*-*-*-*-*-*-*-*-*-*-*-*-*                                              Cardiology Consultation       ENCOUNTER DATE: 22 10:01 AM  PATIENT NAME: Len Moreno   : 1940    MRN: [id number]  AGE:82 y o  SEX: female  [address]ousa MD     PRIMARY CARE PHYSICIAN: Divya Juárez DO    ACTIVE DIAGNOSIS THIS VISIT  1  Preoperative cardiovascular examination  POCT ECG   2  Carotid stenosis, bilateral  Ambulatory referral to Cardiology   3  Pure hypercholesterolemia     4  Smoking     5  Peripheral arterial occlusive disease ([address] )     6  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 ([address] )     7  Chronic obstructive pulmonary disease, unspecified COPD type ([address] )     8   Iliac artery stenosis, bilateral (HCC)     9  Stage 3 chronic kidney disease, unspecified whether stage 3a or 3b CKD ([address] )       ACTIVE PROBLEM LIST  Patient Active Problem List   Diagnosis    Smoking    Peripheral arterial occlusive disease [facility]    Proximal humerus fracture    Atherosclerosis of native arteries of extremities with intermittent claudication, unspecified extremity (HCC)    Chronic obstructive pulmonary disease, unspecified [facility])    Degeneration of intervertebral disc    Hyperlipidemia    Iliac artery stenosis, bilateral (HCC)    Carotid stenosis, bilateral    Osteoarthritis    Osteopenia    Varicose veins of right lower extremity with pain    Vitamin D deficiency    Age-related nuclear cataract of both eyes    Open angle with borderline findings, low risk, bilateral    Disorder of arteries and arterioles, unspecified ([facility] )    Stricture of artery (HCC)    Stage 3 chronic kidney disease, unspecified whether stage 3a or 3b CKD ([facility] )    Fall    Scalp laceration    Embolism and thrombosis of arteries of the lower extremities ([facility] )    Preoperative cardiovascular examination       CARDIOLOGY SPECIALTY COMMENTS  Preoperative risk assessment for right carotid endarterectomy under general anesthesia  08/04/2022 CTA of the head neck demonstrating 90% stenosis of the proximal right internal carotid artery and 50% stenosis of the proximal left internal carotid artery    HPI:       Patient is for preoperative evaluation for right carotid endarterectomy  She has no history of cardiac problems but does have significant vascular disease  Patient denies chest discomfort or shortness of breath  Patient has no palpitations  Patient denies symptoms of dizziness, lightheadedness or near-syncope/syncope  Patient denies leg edema  Patient denies symptoms of orthopnea or paroxysmal nocturnal dyspnea  She is able to do all her housework  She does have lower extremity claudication and back pain  She paces herself and is able to do all of her housework  She does state that if she would really rush, she would get mild shortness of breath  She has smoked since age 12  She used to smoke a pack a day but has cut back significantly  Presently she smokes 5-10 cigarettes daily  She does have a chronic cough from smoking      DISCUSSION/PLAN:         · Recommend proceeding with surgery  · Patient a moderate to moderately high cardiovascular risk but with the degree of stenosis in her right internal carotid artery, the benefits clearly appear to outweigh the risks  · Patient not given a return but would be glad to see the patient in the future if the need arises      Lab Studies:    Lab Results   Component Value Date    CHOLESTEROL 175 12/03/2021    CHOLESTEROL 130 11/19/2020    CHOLESTEROL 159 10/29/2019     Lab Results   Component Value Date    TRIG 52 12/03/2021    TRIG 79 11/19/2020    TRIG 69 10/29/2019     Lab Results   Component Value Date    HDL 84 12/03/2021    HDL 50 11/19/2020    HDL 67 10/29/2019     Lab Results   Component Value Date    LDLCALC 81 12/03/2021    LDLCALC 64 11/19/2020    LDLCALC 77 10/29/2019     Lab Results   Component Value Date    NONHDL 80 11/19/2020    NONHDL 92 10/29/2019    NONHDL 113 12/13/2018       Lab Results   Component Value Date    EGFR 64 07/28/2022    EGFR 55 12/03/2021    EGFR 65 08/19/2019    SODIUM 138 07/28/2022    SODIUM 142 12/03/2021    SODIUM 141 11/19/2020    K 4 6 07/28/2022    K 4 0 12/03/2021    K 4 6 11/19/2020     (H) 07/28/2022     12/03/2021     11/19/2020    CO2 28 07/28/2022    CO2 27 01/30/2022    CO2 28 12/03/2021    ANIONGAP 8 09/24/2015    ANIONGAP 10 02/25/2015    ANIONGAP 5 01/23/2015    BUN 14 07/28/2022    BUN 14 12/03/2021    BUN 22 11/19/2020    CREATININE 0 85 07/28/2022    CREATININE 0 97 12/03/2021    CREATININE 0 99 (H) 11/19/2020     Lab Results   Component Value Date    WBC 7 27 12/03/2021    WBC 7 6 11/19/2020    WBC 7 0 10/29/2019    HGB 13 3 01/30/2022    HGB 13 9 12/03/2021    HGB 14 4 11/19/2020    HCT 39 01/30/2022    HCT 42 2 12/03/2021    HCT 42 4 11/19/2020    MCV 99 (H) 12/03/2021     7 (H) 11/19/2020     9 (H) 10/29/2019    MCH 32 6 12/03/2021    MCH 34 2 (H) 11/19/2020    MCH 34 9 (H) 10/29/2019    MCHC 32 9 12/03/2021    MCHC 34 0 11/19/2020    MCHC 33 3 10/29/2019     12/03/2021     11/19/2020    PLT 207 10/29/2019        Lab Results   Component Value Date    GLUCOSE 86 01/30/2022    GLUCOSE 86 09/24/2015    GLUCOSE 84 02/25/2015    CALCIUM 9 7 07/28/2022    CALCIUM 9 1 12/03/2021    CALCIUM 9 5 11/19/2020    AST 18 12/03/2021    AST 14 11/19/2020    AST 15 10/29/2019    ALT 20 12/03/2021    ALT 13 11/19/2020    ALT 15 10/29/2019    ALKPHOS 85 12/03/2021    ALKPHOS 63 11/19/2020    ALKPHOS 73 10/29/2019    PROT 7 7 09/24/2015    PROT 7 5 09/04/2014    PROT 7 5 02/25/2014    BILITOT 1 54 (H) 09/24/2015    BILITOT 1 35 (H) 09/04/2014    BILITOT 1 07 (H) 02/25/2014       Results for orders placed or performed in visit on 09/20/22   POCT ECG    Narrative    Normal sinus rhythm at a rate of 64 beats per minute  Lead V3 he is poorly placed  Normal EKG           Current Outpatient Medications:     aspirin 81 MG tablet, Take 81 mg by mouth daily, Disp: , Rfl:     Calcium Citrate-Vitamin D (CALCIUM + D PO), Take 2 tablets by mouth daily, Disp: , Rfl:     clopidogrel (PLAVIX) 75 mg tablet, Take 1 tablet (75 mg total) by mouth daily, Disp: 90 tablet, Rfl: 0    simvastatin (ZOCOR) 40 mg tablet, Take 1 tablet (40 mg total) by mouth daily at bedtime, Disp: 90 tablet, Rfl: 1  Allergies   Allergen Reactions    Celecoxib GI Intolerance    Other Rash     Adhesive tape    Chocolate - Food Allergy     Cilostazol Edema     rash    Sutures  [Suture]     Wound Dressing Adhesive      Other reaction(s): Itching       Past Medical History:   Diagnosis Date    Food allergy     chocholate    Hyperlipidemia     Peripheral arterial occlusive disease (HCC)     Right humeral fracture     right poximal    Smoking     Wears dentures     full upper    Wears glasses      Social History     Socioeconomic History    Marital status:      Spouse name: Not on file    Number of children: Not on file    Years of education: Not on file    Highest education level: Not on file   Occupational History    Not on file   Tobacco Use    Smoking status: Current Every Day Smoker     Packs/day: 0 50     Years: 64 00     Pack years: 32 00     Types: Cigarettes    Smokeless tobacco: Never Used   Vaping Use    Vaping Use: Never used   Substance and Sexual Activity    Alcohol use: Not Currently     Comment: quit 3 05/2020    Drug use: No    Sexual activity: Not Currently   Other Topics Concern    Not on file   Social History Narrative    Not on file     Social Determinants of Health     Financial Resource Strain: Not on file   Food Insecurity: Not on file   Transportation Needs: Not on file   Physical Activity: Not on file   Stress: Not on file   Social Connections: Not on file   Intimate Partner Violence: Not on file   Housing Stability: Not on file      Family History   Problem Relation Age of Onset    No Known Problems Mother     No Known Problems Father      Past Surgical History:   Procedure Laterality Date    APPENDECTOMY      BREAST SURGERY      gilberto    COLONOSCOPY      HEMORRHOID SURGERY      IR AORTAGRAM WITH RUN-OFF  8/26/2019    ID RECONSTR TOTAL SHOULDER IMPLANT Right 12/8/2016    Procedure: ARTHROPLASTY SHOULDER REVERSE;  Surgeon: Margareth Fregoso MD;  Location: [facility] OR;  Service: Orthopedics    THROMBOENDARTERECTOMY      TONSILLECTOMY         Review of Systems:  Review of Systems   Constitutional: Negative  HENT: Negative  Respiratory: Negative for chest tightness and shortness of breath  Cardiovascular: Negative for chest pain and leg swelling  Gastrointestinal: Negative  Endocrine: Negative  Genitourinary: Negative  Musculoskeletal: Positive for back pain  Lower extremity claudication   Skin: Negative  Allergic/Immunologic: Negative  Neurological: Negative  Hematological: Negative  Psychiatric/Behavioral: Negative          Physical Exam:  /84 (BP Location: Left arm, Patient Position: Sitting, Cuff Size: Adult)   Pulse 64   Ht 5' 1" (1 549 m)   Wt 62 7 kg (138 lb 3 2 oz)   BMI 26 11 kg/m²     PREVIOUS WEIGHTS:   Wt Readings from Last 10 Encounters:   09/20/22 62 7 kg (138 lb 3 2 oz)   08/23/22 62 8 kg (138 lb 6 4 oz)   05/31/22 60 8 kg (134 lb)   02/08/22 62 1 kg (136 lb 12 8 oz)   01/25/22 60 8 kg (134 lb)   11/30/21 62 6 kg (138 lb)   11/11/21 63 5 kg (140 lb)   11/19/20 63 5 kg (140 lb)   11/05/20 63 5 kg (140 lb)   08/14/20 66 2 kg (146 lb)      Physical Exam  Constitutional:       General: She is not in acute distress  Appearance: She is well-developed  HENT:      Head: Normocephalic and atraumatic  Neck:      Thyroid: No thyromegaly  Vascular: No carotid bruit or JVD  Trachea: No tracheal deviation  Cardiovascular:      Rate and Rhythm: Normal rate and regular rhythm  Pulses: Normal pulses  Heart sounds: Normal heart sounds  No murmur heard  No friction rub  No gallop  Pulmonary:      Effort: Pulmonary effort is normal  No respiratory distress  Breath sounds: Normal breath sounds  No wheezing, rhonchi or rales  Chest:      Chest wall: No tenderness  Abdominal:      General: Bowel sounds are normal  There is no distension  Palpations: Abdomen is soft  Tenderness: There is no abdominal tenderness  Musculoskeletal:         General: Normal range of motion  Cervical back: Normal range of motion and neck supple  Right lower leg: No edema  Left lower leg: No edema  Skin:     General: Skin is warm and dry  Neurological:      General: No focal deficit present  Mental Status: She is alert and oriented to person, place, and time  Gait: Gait normal    Psychiatric:         Mood and Affect: Mood normal          Behavior: Behavior normal          Thought Content:  Thought content normal          Judgment: Judgment normal      ---------------------------------------------------------------------------------  CAROTIDS  Results for orders placed during the hospital encounter of 07/26/22    VAS carotid complete study    Narrative  [facility] REPORT  CLINICAL:  Operative History:  2019-08-19 Right Common iliac artery PTA/stent  2015-02-26 Right Wound incision & debridement  2015-01-22 Right Common Femoral endarterectomy, patch closure  2015-01-22 Right Other infrainguinal endarterectomy  2015-01-22 Right Sfa endarterectomy, patch closure  2009-07-09 Transluminal placement of iliac stent, percutaneous  Risk Factors  The patient has history of Hyperlipidemia, PAD and smoking (current) 1-2 ppd  FINDINGS:    Right        Impression  PSV  EDV (cm/s)  Direction of Flow  Ratio  Dist  ICA                 59          18                      0 94  Mid  ICA                  67          13                      1 07  Prox  ICA    1 - 49%      85           7                      1 36  Dist CCA                  56          13  Mid CCA                   62           9                      0 93  Prox CCA                  67           5                      1 29  Ext Carotid              294          31                      4 72  Prox Vert                 44          11  Antegrade  Subclavian               127           0  Innominate                52           4    Left         Impression  PSV  EDV (cm/s)  Direction of Flow  Ratio  Dist  ICA                 40          13                      0 71  Mid  ICA                  63          18                      1 14  Prox  ICA    70 - 99%    376         121                      6 76  Dist CCA                  50          13  Mid CCA                   56          13                      0 81  Prox CCA                  69          11  Ext Carotid               89           0                      1 60  Prox Vert                 41          14  Antegrade  Subclavian               132          13        CONCLUSION:    Impression  RIGHT:  There is <50% stenosis noted in the internal carotid artery  Plaque is  heterogenous, calcified and irregular    Vertebral artery flow is antegrade  There is no significant subclavian artery  disease  LEFT:  There is 70-99% stenosis noted in the internal carotid artery  Plaque is  heterogenous, calcified and irregular  Vertebral artery flow is antegrade  There is no significant subclavian artery  disease  Technically difficult study due to thick plaque and acoustic shadowing,  possibly obscuring higher velocities  Technical findings given to Yevgeniy Vaughn at   [facility] [address], 7/26/2022 @1405  Compared to previous study on 1203/2021, there is progression of disease in the  left internal carotid artery  Recommend repeat testing in 6month as per protocol unless otherwise indicated  Internal carotid artery stenosis determination by consensus criteria from:  Justina Hernandez , et al  Carotid Artery Stenosis: Gray-Scale and Doppler US Diagnosis  - Society of Radiologists in [facility] [address], Radiology 2003;  [phone]  SIGNATURE:  Electronically Signed by: Mehdi Camilo MD, RPVI on 2022-07-26 04:03:07 PM       VAS abdominal aorta/iliacs; complete study    Narrative  [facility] REPORT  CLINICAL:  Indications:  Evaluate for progression of Aorto-Iliac occlusive disease s/p revascularization  (8/19/2019)  Patient is asymptomatic at this time  Operative History:  2019-08-19 Right Common iliac artery PTA/stent  2015-02-26 Right Wound incision & debridement  2015-01-22 Right Common Femoral endarterectomy, patch closure  2015-01-22 Right Other infrainguinal endarterectomy  2015-01-22 Right Sfa endarterectomy, patch closure  2009-07-09 Transluminal placement of iliac stent, percutaneous  Risk Factors  The patient has history of Hyperlipidemia, PAD and smoking (current) 1-2 ppd  Clinical  Right Pressure:  145/ mm Hg, Left Pressure:  146/ mm Hg      FINDINGS:    Unilateral     Impression  PSV (cm/s)  EDV (cm/s)  AP (cm)  Sup-Ariane Ao                         71           4      1 5  Px Inf-Jatinder Ao                      64           2      1 4  Ds Inf-Jatinder Ao                     117          11      1 3  Celiac         >70%               217          25  Prox  SMA                         175          27    Right             PSV (cm/s)  EDV (cm/s)  AP (cm)   RAR  Prox ANDREW - Stent         155          10      0 8  Dist ANDREW - Stent         283          28      0 7  Prox  EIA                129          18      0 8  Dist EIA                 137           0      0 8  Prox Renal               171          27           2 41    Left              PSV (cm/s)  EDV (cm/s)  AP (cm)   RAR  Prox ANDREW - Stent         112           4      1 0  Dist ANDREW - Stent         114           6      0 9  Prox  EIA                136           0      1 0  Dist EIA                 239          29      1 0  Prox Renal               144          32           2 02        CONCLUSION:    Impression  The aorta is patent and normal in caliber measuring 1 5 cm in its greatest  diameter  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  SMA and bilateral renal arteries are patent and of normal caliber  Patent common iliac artery stents bilaterally  Ankle/Brachial indices are: Right- 0 81 (Prior 0 87 ) and Left- 0 81 (Prior  0 78 )  Great toe pressures are within the healing range  PVR/ PPG tracings are dampened  Technically difficult study due to thick calcific plaque possibly hiding higher  velocities  Compared to previous study on 6/16/2020 , there is no significant change  SIGNATURE:  Electronically Signed by: Antoine Rizzo MD, RPVI on 2022-07-26 04:01:51 PM          ======================================================   I have personally reviewed pertinent reports  Portions of the record may have been created with voice recognition software  Occasional wrong word or "sound a like" substitutions may have occurred due to the inherent limitations of voice recognition software   Read the chart carefully and recognize, using context, where substitutions have occurred      SIGNATURES:   Marybeth Armstrong MD

## 2022-09-20 NOTE — TELEPHONE ENCOUNTER
Received Cardiac Clearance from Dr Dalton Casey   Per OV of 9/20/22:  DISCUSSION/PLAN:          · Recommend proceeding with surgery  · Patient a moderate to moderately high cardiovascular risk but with the degree of stenosis in her right internal carotid artery, the benefits clearly appear to outweigh the risks  · Patient not given a return but would be glad to see the patient in the future if the need arises

## 2022-09-20 NOTE — LETTER
2022     Dajuan Hicks MD  [address]  [place]  [address] [facility] [address]  [address] [place] [address] [address] [address]    Patient: Rodri Williamson   YOB: 1940   Date of Visit: 2022       Dear Dr Yulia Ribera: Thank you for referring Maria Teresa Otero to me for evaluation  Below are my notes for this consultation  If you have questions, please do not hesitate to call me  I look forward to following your patient along with you  Sincerely,        Keily Garcia MD        CC: No Recipients  Keily Garcia MD  2022 10:01 AM  Sign when Signing Visit     [facility]  [address] [place], [place] [address]Dejuan   [id number]  Phone#  [phone]   Fax#  [phone]  *-*-*-*-*-*-*-*-*-*-*-*-*-*-*-*-*-*-*-*-*-*-*-*-*-*-*-*-*-*-*-*-*-*-*-*-*-*-*-*-*-*-*-*-*-*-*-*-*-*-*-*-*-*                                              Cardiology Consultation       ENCOUNTER DATE: 22 10:01 AM  PATIENT NAME: Rodri Williamson   : 1940    MRN: [id number]  AGE:82 y o  SEX: female  [address] MD     PRIMARY CARE PHYSICIAN: Veronica Bermudez DO    ACTIVE DIAGNOSIS THIS VISIT  1  Preoperative cardiovascular examination  POCT ECG   2  Carotid stenosis, bilateral  Ambulatory referral to Cardiology   3  Pure hypercholesterolemia     4  Smoking     5  Peripheral arterial occlusive disease ([address] )     6  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 ([address] )     7  Chronic obstructive pulmonary disease, unspecified COPD type ([address] )     8   Iliac artery stenosis, bilateral (HCC)     9  Stage 3 chronic kidney disease, unspecified whether stage 3a or 3b CKD ([address] )       ACTIVE PROBLEM LIST  Patient Active Problem List   Diagnosis    Smoking    Peripheral arterial occlusive disease ([address] )    Proximal humerus fracture    Atherosclerosis of native arteries of extremities with intermittent claudication, unspecified extremity (HCC)    Chronic obstructive pulmonary disease, unspecified [facility])    Degeneration of intervertebral disc    Hyperlipidemia    Iliac artery stenosis, bilateral (HCC)    Carotid stenosis, bilateral    Osteoarthritis    Osteopenia    Varicose veins of right lower extremity with pain    Vitamin D deficiency    Age-related nuclear cataract of both eyes    Open angle with borderline findings, low risk, bilateral    Disorder of arteries and arterioles, unspecified ([facility] )    Stricture of artery (HCC)    Stage 3 chronic kidney disease, unspecified whether stage 3a or 3b CKD ([facility] )    Fall    Scalp laceration    Embolism and thrombosis of arteries of the lower extremities ([facility] )    Preoperative cardiovascular examination       CARDIOLOGY SPECIALTY COMMENTS  Preoperative risk assessment for right carotid endarterectomy under general anesthesia  08/04/2022 CTA of the head neck demonstrating 90% stenosis of the proximal right internal carotid artery and 50% stenosis of the proximal left internal carotid artery    HPI:       Patient is for preoperative evaluation for right carotid endarterectomy  She has no history of cardiac problems but does have significant vascular disease  Patient denies chest discomfort or shortness of breath  Patient has no palpitations  Patient denies symptoms of dizziness, lightheadedness or near-syncope/syncope  Patient denies leg edema  Patient denies symptoms of orthopnea or paroxysmal nocturnal dyspnea  She is able to do all her housework  She does have lower extremity claudication and back pain  She paces herself and is able to do all of her housework  She does state that if she would really rush, she would get mild shortness of breath  She has smoked since age 12  She used to smoke a pack a day but has cut back significantly  Presently she smokes 5-10 cigarettes daily  She does have a chronic cough from smoking      DISCUSSION/PLAN:         · Recommend proceeding with surgery  · Patient a moderate to moderately high cardiovascular risk but with the degree of stenosis in her right internal carotid artery, the benefits clearly appear to outweigh the risks  · Patient not given a return but would be glad to see the patient in the future if the need arises      Lab Studies:    Lab Results   Component Value Date    CHOLESTEROL 175 12/03/2021    CHOLESTEROL 130 11/19/2020    CHOLESTEROL 159 10/29/2019     Lab Results   Component Value Date    TRIG 52 12/03/2021    TRIG 79 11/19/2020    TRIG 69 10/29/2019     Lab Results   Component Value Date    HDL 84 12/03/2021    HDL 50 11/19/2020    HDL 67 10/29/2019     Lab Results   Component Value Date    LDLCALC 81 12/03/2021    LDLCALC 64 11/19/2020    LDLCALC 77 10/29/2019     Lab Results   Component Value Date    NONHDL 80 11/19/2020    NONHDL 92 10/29/2019    NONHDL 113 12/13/2018       Lab Results   Component Value Date    EGFR 64 07/28/2022    EGFR 55 12/03/2021    EGFR 65 08/19/2019    SODIUM 138 07/28/2022    SODIUM 142 12/03/2021    SODIUM 141 11/19/2020    K 4 6 07/28/2022    K 4 0 12/03/2021    K 4 6 11/19/2020     (H) 07/28/2022     12/03/2021     11/19/2020    CO2 28 07/28/2022    CO2 27 01/30/2022    CO2 28 12/03/2021    ANIONGAP 8 09/24/2015    ANIONGAP 10 02/25/2015    ANIONGAP 5 01/23/2015    BUN 14 07/28/2022    BUN 14 12/03/2021    BUN 22 11/19/2020    CREATININE 0 85 07/28/2022    CREATININE 0 97 12/03/2021    CREATININE 0 99 (H) 11/19/2020     Lab Results   Component Value Date    WBC 7 27 12/03/2021    WBC 7 6 11/19/2020    WBC 7 0 10/29/2019    HGB 13 3 01/30/2022    HGB 13 9 12/03/2021    HGB 14 4 11/19/2020    HCT 39 01/30/2022    HCT 42 2 12/03/2021    HCT 42 4 11/19/2020    MCV 99 (H) 12/03/2021     7 (H) 11/19/2020     9 (H) 10/29/2019    MCH 32 6 12/03/2021    MCH 34 2 (H) 11/19/2020    MCH 34 9 (H) 10/29/2019    MCHC 32 9 12/03/2021    MCHC 34 0 11/19/2020    MCHC 33 3 10/29/2019     12/03/2021     11/19/2020    PLT 207 10/29/2019        Lab Results   Component Value Date    GLUCOSE 86 01/30/2022    GLUCOSE 86 09/24/2015    GLUCOSE 84 02/25/2015    CALCIUM 9 7 07/28/2022    CALCIUM 9 1 12/03/2021    CALCIUM 9 5 11/19/2020    AST 18 12/03/2021    AST 14 11/19/2020    AST 15 10/29/2019    ALT 20 12/03/2021    ALT 13 11/19/2020    ALT 15 10/29/2019    ALKPHOS 85 12/03/2021    ALKPHOS 63 11/19/2020    ALKPHOS 73 10/29/2019    PROT 7 7 09/24/2015    PROT 7 5 09/04/2014    PROT 7 5 02/25/2014    BILITOT 1 54 (H) 09/24/2015    BILITOT 1 35 (H) 09/04/2014    BILITOT 1 07 (H) 02/25/2014       Results for orders placed or performed in visit on 09/20/22   POCT ECG    Narrative    Normal sinus rhythm at a rate of 64 beats per minute  Lead V3 he is poorly placed  Normal EKG           Current Outpatient Medications:     aspirin 81 MG tablet, Take 81 mg by mouth daily, Disp: , Rfl:     Calcium Citrate-Vitamin D (CALCIUM + D PO), Take 2 tablets by mouth daily, Disp: , Rfl:     clopidogrel (PLAVIX) 75 mg tablet, Take 1 tablet (75 mg total) by mouth daily, Disp: 90 tablet, Rfl: 0    simvastatin (ZOCOR) 40 mg tablet, Take 1 tablet (40 mg total) by mouth daily at bedtime, Disp: 90 tablet, Rfl: 1  Allergies   Allergen Reactions    Celecoxib GI Intolerance    Other Rash     Adhesive tape    Chocolate - Food Allergy     Cilostazol Edema     rash    Sutures  [Suture]     Wound Dressing Adhesive      Other reaction(s): Itching       Past Medical History:   Diagnosis Date    Food allergy     chocholate    Hyperlipidemia     Peripheral arterial occlusive disease (HCC)     Right humeral fracture     right poximal    Smoking     Wears dentures     full upper    Wears glasses      Social History     Socioeconomic History    Marital status:      Spouse name: Not on file    Number of children: Not on file    Years of education: Not on file    Highest education level: Not on file   Occupational History    Not on file   Tobacco Use    Smoking status: Current Every Day Smoker     Packs/day: 0 50     Years: 64 00     Pack years: 32 00     Types: Cigarettes    Smokeless tobacco: Never Used   Vaping Use    Vaping Use: Never used   Substance and Sexual Activity    Alcohol use: Not Currently     Comment: quit 3 05/2020    Drug use: No    Sexual activity: Not Currently   Other Topics Concern    Not on file   Social History Narrative    Not on file     Social Determinants of Health     Financial Resource Strain: Not on file   Food Insecurity: Not on file   Transportation Needs: Not on file   Physical Activity: Not on file   Stress: Not on file   Social Connections: Not on file   Intimate Partner Violence: Not on file   Housing Stability: Not on file      Family History   Problem Relation Age of Onset    No Known Problems Mother     No Known Problems Father      Past Surgical History:   Procedure Laterality Date    APPENDECTOMY      BREAST SURGERY      gilberto    COLONOSCOPY      HEMORRHOID SURGERY      IR AORTAGRAM WITH RUN-OFF  8/26/2019    MT RECONSTR TOTAL SHOULDER IMPLANT Right 12/8/2016    Procedure: ARTHROPLASTY SHOULDER REVERSE;  Surgeon: Jenn Womack MD;  Location: [facility];  Service: Orthopedics    THROMBOENDARTERECTOMY      TONSILLECTOMY         Review of Systems:  Review of Systems   Constitutional: Negative  HENT: Negative  Respiratory: Negative for chest tightness and shortness of breath  Cardiovascular: Negative for chest pain and leg swelling  Gastrointestinal: Negative  Endocrine: Negative  Genitourinary: Negative  Musculoskeletal: Positive for back pain  Lower extremity claudication   Skin: Negative  Allergic/Immunologic: Negative  Neurological: Negative  Hematological: Negative  Psychiatric/Behavioral: Negative          Physical Exam:  /84 (BP Location: Left arm, Patient Position: Sitting, Cuff Size: Adult)   Pulse 64   Ht 5' 1" (1 549 m)   Wt 62 7 kg (138 lb 3 2 oz)   BMI 26 11 kg/m²     PREVIOUS WEIGHTS:   Wt Readings from Last 10 Encounters:   09/20/22 62 7 kg (138 lb 3 2 oz)   08/23/22 62 8 kg (138 lb 6 4 oz)   05/31/22 60 8 kg (134 lb)   02/08/22 62 1 kg (136 lb 12 8 oz)   01/25/22 60 8 kg (134 lb)   11/30/21 62 6 kg (138 lb)   11/11/21 63 5 kg (140 lb)   11/19/20 63 5 kg (140 lb)   11/05/20 63 5 kg (140 lb)   08/14/20 66 2 kg (146 lb)      Physical Exam  Constitutional:       General: She is not in acute distress  Appearance: She is well-developed  HENT:      Head: Normocephalic and atraumatic  Neck:      Thyroid: No thyromegaly  Vascular: No carotid bruit or JVD  Trachea: No tracheal deviation  Cardiovascular:      Rate and Rhythm: Normal rate and regular rhythm  Pulses: Normal pulses  Heart sounds: Normal heart sounds  No murmur heard  No friction rub  No gallop  Pulmonary:      Effort: Pulmonary effort is normal  No respiratory distress  Breath sounds: Normal breath sounds  No wheezing, rhonchi or rales  Chest:      Chest wall: No tenderness  Abdominal:      General: Bowel sounds are normal  There is no distension  Palpations: Abdomen is soft  Tenderness: There is no abdominal tenderness  Musculoskeletal:         General: Normal range of motion  Cervical back: Normal range of motion and neck supple  Right lower leg: No edema  Left lower leg: No edema  Skin:     General: Skin is warm and dry  Neurological:      General: No focal deficit present  Mental Status: She is alert and oriented to person, place, and time  Gait: Gait normal    Psychiatric:         Mood and Affect: Mood normal          Behavior: Behavior normal          Thought Content:  Thought content normal          Judgment: Judgment normal      ---------------------------------------------------------------------------------  CAROTIDS  Results for orders placed during the hospital encounter of 07/26/22    VAS carotid complete study    Narrative  [facility] REPORT  CLINICAL:  Operative History:  2019-08-19 Right Common iliac artery PTA/stent  2015-02-26 Right Wound incision & debridement  2015-01-22 Right Common Femoral endarterectomy, patch closure  2015-01-22 Right Other infrainguinal endarterectomy  2015-01-22 Right Sfa endarterectomy, patch closure  2009-07-09 Transluminal placement of iliac stent, percutaneous  Risk Factors  The patient has history of Hyperlipidemia, PAD and smoking (current) 1-2 ppd  FINDINGS:    Right        Impression  PSV  EDV (cm/s)  Direction of Flow  Ratio  Dist  ICA                 59          18                      0 94  Mid  ICA                  67          13                      1 07  Prox  ICA    1 - 49%      85           7                      1 36  Dist CCA                  56          13  Mid CCA                   62           9                      0 93  Prox CCA                  67           5                      1 29  Ext Carotid              294          31                      4 72  Prox Vert                 44          11  Antegrade  Subclavian               127           0  Innominate                52           4    Left         Impression  PSV  EDV (cm/s)  Direction of Flow  Ratio  Dist  ICA                 40          13                      0 71  Mid  ICA                  63          18                      1 14  Prox  ICA    70 - 99%    376         121                      6 76  Dist CCA                  50          13  Mid CCA                   56          13                      0 81  Prox CCA                  69          11  Ext Carotid               89           0                      1 60  Prox Vert                 41          14  Antegrade  Subclavian               132          13        CONCLUSION:    Impression  RIGHT:  There is <50% stenosis noted in the internal carotid artery  Plaque is  heterogenous, calcified and irregular    Vertebral artery flow is antegrade  There is no significant subclavian artery  disease  LEFT:  There is 70-99% stenosis noted in the internal carotid artery  Plaque is  heterogenous, calcified and irregular  Vertebral artery flow is antegrade  There is no significant subclavian artery  disease  Technically difficult study due to thick plaque and acoustic shadowing,  possibly obscuring higher velocities  Technical findings given to Halima Stephens at   [address], 7/26/2022 @1405  Compared to previous study on 1203/2021, there is progression of disease in the  left internal carotid artery  Recommend repeat testing in 6month as per protocol unless otherwise indicated  Internal carotid artery stenosis determination by consensus criteria from:  Froylan Cui et al  Carotid Artery Stenosis: Gray-Scale and Doppler US Diagnosis  - Society of Radiologists in [facility] [address], Radiology 2003;  [phone]  SIGNATURE:  Electronically Signed by: Orlando Elias MD, RPVI on 2022-07-26 04:03:07 PM       VAS abdominal aorta/iliacs; complete study    Narrative  [facility] REPORT  CLINICAL:  Indications:  Evaluate for progression of Aorto-Iliac occlusive disease s/p revascularization  (8/19/2019)  Patient is asymptomatic at this time  Operative History:  2019-08-19 Right Common iliac artery PTA/stent  2015-02-26 Right Wound incision & debridement  2015-01-22 Right Common Femoral endarterectomy, patch closure  2015-01-22 Right Other infrainguinal endarterectomy  2015-01-22 Right Sfa endarterectomy, patch closure  2009-07-09 Transluminal placement of iliac stent, percutaneous  Risk Factors  The patient has history of Hyperlipidemia, PAD and smoking (current) 1-2 ppd  Clinical  Right Pressure:  145/ mm Hg, Left Pressure:  146/ mm Hg      FINDINGS:    Unilateral     Impression  PSV (cm/s)  EDV (cm/s)  AP (cm)  Sup-Ariane Ao                         71           4      1 5  Px Inf-Jatinder Ao                      64           2      1 4  Ds Inf-Jatinder Ao                     117          11      1 3  Celiac         >70%               217          25  Prox  SMA                         175          27    Right             PSV (cm/s)  EDV (cm/s)  AP (cm)   RAR  Prox ANDREW - Stent         155          10      0 8  Dist ANDREW - Stent         283          28      0 7  Prox  EIA                129          18      0 8  Dist EIA                 137           0      0 8  Prox Renal               171          27           2 41    Left              PSV (cm/s)  EDV (cm/s)  AP (cm)   RAR  Prox ANDREW - Stent         112           4      1 0  Dist ANDREW - Stent         114           6      0 9  Prox  EIA                136           0      1 0  Dist EIA                 239          29      1 0  Prox Renal               144          32           2 02        CONCLUSION:    Impression  The aorta is patent and normal in caliber measuring 1 5 cm in its greatest  diameter  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  SMA and bilateral renal arteries are patent and of normal caliber  Patent common iliac artery stents bilaterally  Ankle/Brachial indices are: Right- 0 81 (Prior 0 87 ) and Left- 0 81 (Prior  0 78 )  Great toe pressures are within the healing range  PVR/ PPG tracings are dampened  Technically difficult study due to thick calcific plaque possibly hiding higher  velocities  Compared to previous study on 6/16/2020 , there is no significant change  SIGNATURE:  Electronically Signed by: Jaya Wilson MD, RPVI on 2022-07-26 04:01:51 PM    Renal Artery U/S No valid procedures specified  Lower Limb Arterial Duplex  No results found for this or any previous visit  Lower Extremity Venous Duplex  *** HELP TEXT ***    ======================================================   I have personally reviewed pertinent reports  Portions of the record may have been created with voice recognition software   Occasional wrong word or "sound a like" substitutions may have occurred due to the inherent limitations of voice recognition software  Read the chart carefully and recognize, using context, where substitutions have occurred      SIGNATURES:   Nan Sweet MD

## 2022-09-21 ENCOUNTER — PREP FOR PROCEDURE (OUTPATIENT)
Dept: VASCULAR SURGERY | Facility: CLINIC | Age: 82
End: 2022-09-21

## 2022-09-21 NOTE — TELEPHONE ENCOUNTER
Verified patient's insurance   CONFIRMED - Patient's insurance is [employer]  Is patient requesting a call when authorization has been obtained? Patient did not request a call  Surgery Date: 9/28/22  Primary Surgeon: Melissa Vidal // Thania No (NPI: [id number])  Assisting Surgeon: Not Applicable (N/A)  Facility: [facility] (Tax: [id number] / NPI: [id number])  Inpatient / Outpatient: Inpatient  Level: 3    Clearance Received: Yes, Cardiology   Consent Received: Yes, scanned into Epic on 8/23/22  Medication Hold / Last Dose: Hold on ASA and Plavix starting 9/21/22 - OK per Dr Anthony Chamorro: 9/21/22  IR Notified: Not Applicable (N/A)  Rep   Notified: Not Applicable (N/A)  Equipment Needs: Not Applicable (N/A)  Vas Lab Requested: 9/21/22  Patient Contacted: 9/21/22    Diagnosis: I65 23  Procedure/ CPT Code(s): (CEA) Carotid Endarterectomy / Patch Angioplasty // CPT: 58203    For varicose vein related procedures:   Last LEVDR: Not Applicable (N/A)  CEAP Classification: Not Applicable (N/A)  VCSS: Not Applicable (N/A)    Post Operative Date/ Time: To Be Determined (TBD)     Pt will have blood work done at [address] done 9/20/22  Pt is vaccinated for covid  Per Dr Jerrell Taylor pt to hold ASA and Plavix starting today 9/21/22  Pt cleared by Dr Priti Rodriguez 9/20/22

## 2022-09-21 NOTE — TELEPHONE ENCOUNTER
Authorization requirements reviewed  Please refer to Boy Oh / Cortney Luna number [id number] for case updates

## 2022-09-21 NOTE — TELEPHONE ENCOUNTER
Lm for pt to not take her Plavix or ASA today  Advised we would like to schedule her surgery with Dr Susan Guzman on 9/28/22 at the [facility]  Asked her to please call me back

## 2022-09-22 ENCOUNTER — APPOINTMENT (OUTPATIENT)
Dept: LAB | Facility: IMAGING CENTER | Age: 82
End: 2022-09-22
Payer: COMMERCIAL

## 2022-09-22 ENCOUNTER — ANESTHESIA EVENT (OUTPATIENT)
Dept: PERIOP | Facility: HOSPITAL | Age: 82
DRG: 039 | End: 2022-09-22
Payer: COMMERCIAL

## 2022-09-22 DIAGNOSIS — I65.23 CAROTID STENOSIS, BILATERAL: ICD-10-CM

## 2022-09-22 LAB
ANION GAP SERPL CALCULATED.3IONS-SCNC: 7 MMOL/L (ref 4–13)
BUN SERPL-MCNC: 16 MG/DL (ref 5–25)
CALCIUM SERPL-MCNC: 9.4 MG/DL (ref 8.3–10.1)
CHLORIDE SERPL-SCNC: 110 MMOL/L (ref 96–108)
CO2 SERPL-SCNC: 24 MMOL/L (ref 21–32)
CREAT SERPL-MCNC: 0.87 MG/DL (ref 0.6–1.3)
ERYTHROCYTE [DISTWIDTH] IN BLOOD BY AUTOMATED COUNT: 13.8 % (ref 11.6–15.1)
GFR SERPL CREATININE-BSD FRML MDRD: 62 ML/MIN/1.73SQ M
GLUCOSE P FAST SERPL-MCNC: 96 MG/DL (ref 65–99)
HCT VFR BLD AUTO: 42.2 % (ref 34.8–46.1)
HGB BLD-MCNC: 13.8 G/DL (ref 11.5–15.4)
MCH RBC QN AUTO: 34.7 PG (ref 26.8–34.3)
MCHC RBC AUTO-ENTMCNC: 32.7 G/DL (ref 31.4–37.4)
MCV RBC AUTO: 106 FL (ref 82–98)
PLATELET # BLD AUTO: 234 THOUSANDS/UL (ref 149–390)
PMV BLD AUTO: 10 FL (ref 8.9–12.7)
POTASSIUM SERPL-SCNC: 4.4 MMOL/L (ref 3.5–5.3)
RBC # BLD AUTO: 3.98 MILLION/UL (ref 3.81–5.12)
SODIUM SERPL-SCNC: 141 MMOL/L (ref 135–147)
WBC # BLD AUTO: 8.82 THOUSAND/UL (ref 4.31–10.16)

## 2022-09-22 PROCEDURE — 36415 COLL VENOUS BLD VENIPUNCTURE: CPT

## 2022-09-22 PROCEDURE — 85027 COMPLETE CBC AUTOMATED: CPT

## 2022-09-22 PROCEDURE — 80048 BASIC METABOLIC PNL TOTAL CA: CPT

## 2022-09-27 NOTE — PRE-PROCEDURE INSTRUCTIONS
Pre-Surgery Instructions:   Medication Instructions    aspirin 81 MG tablet held since 9/19    Calcium Citrate-Vitamin D (CALCIUM + D PO) Held since 9/26    clopidogrel (PLAVIX) 75 mg tablet held since 9/19      See Geriatric Assessment below    Cognitive Assessment: no concerns  Falls (last 6 months): 0  Lam Total Score: 23 00   PHQ- 9 Depression Scale: 0   Nutrition Assessment Score: 4   METS: 8 33   Health goals: stay active  -What are your overall health goals? Do things with her grandson  Does not want to quit smoking    -What brings you strength? Daughter/grandson    -What activities are important to you? "I walk miles everyday"    Reviewed with patient, in detail, instructions from "My Surgical Experience"  Instructed to avoid all  OTC vitamins/supplements and NSAIDS starting today  Tylenol ok to take PRN  Advised patient that Heriberto Steinberg will call with surgery arrival time and hospital directions the business day prior to surgery  Advised patient nothing eat or drink after midnight prior to surgery  Instructed to call surgeon's office in meantime with any new illnesses/exposure  Patient verbalized understanding of current visitor restrictions/masking guidelines and advised that he/she can confirm these at time of arrival call with Heriberto Steinberg  Patient verbalized understanding and knows to call surgeon's office with any additional questions prior to surgery

## 2022-09-28 ENCOUNTER — ANESTHESIA (OUTPATIENT)
Dept: PERIOP | Facility: HOSPITAL | Age: 82
DRG: 039 | End: 2022-09-28
Payer: COMMERCIAL

## 2022-09-28 ENCOUNTER — APPOINTMENT (OUTPATIENT)
Dept: NON INVASIVE DIAGNOSTICS | Facility: HOSPITAL | Age: 82
DRG: 039 | End: 2022-09-28
Payer: COMMERCIAL

## 2022-09-28 ENCOUNTER — HOSPITAL ENCOUNTER (INPATIENT)
Facility: HOSPITAL | Age: 82
LOS: 1 days | Discharge: HOME | DRG: 039 | End: 2022-09-29
Attending: SURGERY | Admitting: SURGERY
Payer: COMMERCIAL

## 2022-09-28 DIAGNOSIS — I65.21 CAROTID STENOSIS, RIGHT: ICD-10-CM

## 2022-09-28 DIAGNOSIS — Z09 SURGERY FOLLOW-UP: ICD-10-CM

## 2022-09-28 DIAGNOSIS — Z01.89 ENCOUNTER FOR GERIATRIC ASSESSMENT: Primary | ICD-10-CM

## 2022-09-28 LAB
ABO GROUP BLD: NORMAL
BLD GP AB SCN SERPL QL: NEGATIVE
KCT BLD-ACNC: 194 SEC (ref 89–137)
KCT BLD-ACNC: 212 SEC (ref 89–137)
KCT BLD-ACNC: 231 SEC (ref 89–137)
RH BLD: POSITIVE
SPECIMEN EXPIRATION DATE: NORMAL
SPECIMEN SOURCE: ABNORMAL

## 2022-09-28 PROCEDURE — NC001 PR NO CHARGE: Performed by: SURGERY

## 2022-09-28 PROCEDURE — 99223 1ST HOSP IP/OBS HIGH 75: CPT | Performed by: INTERNAL MEDICINE

## 2022-09-28 PROCEDURE — 86901 BLOOD TYPING SEROLOGIC RH(D): CPT | Performed by: SURGERY

## 2022-09-28 PROCEDURE — 85347 COAGULATION TIME ACTIVATED: CPT

## 2022-09-28 PROCEDURE — C1781 MESH (IMPLANTABLE): HCPCS | Performed by: SURGERY

## 2022-09-28 PROCEDURE — 86900 BLOOD TYPING SEROLOGIC ABO: CPT | Performed by: SURGERY

## 2022-09-28 PROCEDURE — 35301 RECHANNELING OF ARTERY: CPT | Performed by: SURGERY

## 2022-09-28 PROCEDURE — 03CH0ZZ EXTIRPATION OF MATTER FROM RIGHT COMMON CAROTID ARTERY, OPEN APPROACH: ICD-10-PCS | Performed by: SURGERY

## 2022-09-28 PROCEDURE — 03UH0KZ SUPPLEMENT RIGHT COMMON CAROTID ARTERY WITH NONAUTOLOGOUS TISSUE SUBSTITUTE, OPEN APPROACH: ICD-10-PCS | Performed by: SURGERY

## 2022-09-28 PROCEDURE — 93882 EXTRACRANIAL UNI/LTD STUDY: CPT

## 2022-09-28 PROCEDURE — 86850 RBC ANTIBODY SCREEN: CPT | Performed by: SURGERY

## 2022-09-28 DEVICE — XENOSURE BIOLOGIC PATCH, 0.8CM X 8CM, EIFU
Type: IMPLANTABLE DEVICE | Site: CAROTID | Status: FUNCTIONAL
Brand: XENOSURE BIOLOGIC PATCH

## 2022-09-28 RX ORDER — ACETAMINOPHEN 325 MG/1
975 TABLET ORAL EVERY 6 HOURS SCHEDULED
Status: DISCONTINUED | OUTPATIENT
Start: 2022-09-28 | End: 2022-09-29 | Stop reason: HOSPADM

## 2022-09-28 RX ORDER — PROTAMINE SULFATE 10 MG/ML
INJECTION, SOLUTION INTRAVENOUS AS NEEDED
Status: DISCONTINUED | OUTPATIENT
Start: 2022-09-28 | End: 2022-09-28

## 2022-09-28 RX ORDER — ONDANSETRON 2 MG/ML
INJECTION INTRAMUSCULAR; INTRAVENOUS AS NEEDED
Status: DISCONTINUED | OUTPATIENT
Start: 2022-09-28 | End: 2022-09-28

## 2022-09-28 RX ORDER — LABETALOL HYDROCHLORIDE 5 MG/ML
5 INJECTION, SOLUTION INTRAVENOUS
Status: DISCONTINUED | OUTPATIENT
Start: 2022-09-28 | End: 2022-09-29 | Stop reason: HOSPADM

## 2022-09-28 RX ORDER — SODIUM CHLORIDE 9 MG/ML
125 INJECTION, SOLUTION INTRAVENOUS CONTINUOUS
Status: DISCONTINUED | OUTPATIENT
Start: 2022-09-28 | End: 2022-09-29 | Stop reason: HOSPADM

## 2022-09-28 RX ORDER — CEFAZOLIN SODIUM 2 G/50ML
2000 SOLUTION INTRAVENOUS ONCE
Status: COMPLETED | OUTPATIENT
Start: 2022-09-28 | End: 2022-09-28

## 2022-09-28 RX ORDER — FENTANYL CITRATE/PF 50 MCG/ML
25 SYRINGE (ML) INJECTION
Status: DISCONTINUED | OUTPATIENT
Start: 2022-09-28 | End: 2022-09-28 | Stop reason: HOSPADM

## 2022-09-28 RX ORDER — SODIUM CHLORIDE 9 MG/ML
INJECTION, SOLUTION INTRAVENOUS CONTINUOUS PRN
Status: DISCONTINUED | OUTPATIENT
Start: 2022-09-28 | End: 2022-09-28

## 2022-09-28 RX ORDER — LIDOCAINE HYDROCHLORIDE 20 MG/ML
INJECTION, SOLUTION EPIDURAL; INFILTRATION; INTRACAUDAL; PERINEURAL AS NEEDED
Status: DISCONTINUED | OUTPATIENT
Start: 2022-09-28 | End: 2022-09-28

## 2022-09-28 RX ORDER — CLOPIDOGREL BISULFATE 75 MG/1
75 TABLET ORAL DAILY
Status: DISCONTINUED | OUTPATIENT
Start: 2022-09-28 | End: 2022-09-29 | Stop reason: HOSPADM

## 2022-09-28 RX ORDER — CALCIUM CARBONATE 500(1250)
1 TABLET ORAL
Status: DISCONTINUED | OUTPATIENT
Start: 2022-09-29 | End: 2022-09-29 | Stop reason: HOSPADM

## 2022-09-28 RX ORDER — HEPARIN SODIUM 5000 [USP'U]/ML
5000 INJECTION, SOLUTION INTRAVENOUS; SUBCUTANEOUS EVERY 8 HOURS SCHEDULED
Status: DISCONTINUED | OUTPATIENT
Start: 2022-09-28 | End: 2022-09-29 | Stop reason: HOSPADM

## 2022-09-28 RX ORDER — BUPIVACAINE HYDROCHLORIDE AND EPINEPHRINE 5; 5 MG/ML; UG/ML
INJECTION, SOLUTION PERINEURAL AS NEEDED
Status: DISCONTINUED | OUTPATIENT
Start: 2022-09-28 | End: 2022-09-28 | Stop reason: HOSPADM

## 2022-09-28 RX ORDER — ASPIRIN 81 MG/1
81 TABLET ORAL ONCE
Status: COMPLETED | OUTPATIENT
Start: 2022-09-28 | End: 2022-09-28

## 2022-09-28 RX ORDER — EPHEDRINE SULFATE 50 MG/ML
INJECTION INTRAVENOUS AS NEEDED
Status: DISCONTINUED | OUTPATIENT
Start: 2022-09-28 | End: 2022-09-28

## 2022-09-28 RX ORDER — SODIUM CHLORIDE, SODIUM GLUCONATE, SODIUM ACETATE, POTASSIUM CHLORIDE, MAGNESIUM CHLORIDE, SODIUM PHOSPHATE, DIBASIC, AND POTASSIUM PHOSPHATE .53; .5; .37; .037; .03; .012; .00082 G/100ML; G/100ML; G/100ML; G/100ML; G/100ML; G/100ML; G/100ML
75 INJECTION, SOLUTION INTRAVENOUS CONTINUOUS
Status: DISPENSED | OUTPATIENT
Start: 2022-09-28 | End: 2022-09-29

## 2022-09-28 RX ORDER — HEPARIN SODIUM 1000 [USP'U]/ML
INJECTION, SOLUTION INTRAVENOUS; SUBCUTANEOUS AS NEEDED
Status: DISCONTINUED | OUTPATIENT
Start: 2022-09-28 | End: 2022-09-28

## 2022-09-28 RX ORDER — FENTANYL CITRATE 50 UG/ML
INJECTION, SOLUTION INTRAMUSCULAR; INTRAVENOUS AS NEEDED
Status: DISCONTINUED | OUTPATIENT
Start: 2022-09-28 | End: 2022-09-28

## 2022-09-28 RX ORDER — ONDANSETRON 2 MG/ML
4 INJECTION INTRAMUSCULAR; INTRAVENOUS ONCE AS NEEDED
Status: DISCONTINUED | OUTPATIENT
Start: 2022-09-28 | End: 2022-09-28 | Stop reason: HOSPADM

## 2022-09-28 RX ORDER — GLYCOPYRROLATE 0.2 MG/ML
INJECTION INTRAMUSCULAR; INTRAVENOUS AS NEEDED
Status: DISCONTINUED | OUTPATIENT
Start: 2022-09-28 | End: 2022-09-28

## 2022-09-28 RX ORDER — HYDRALAZINE HYDROCHLORIDE 20 MG/ML
15 INJECTION INTRAMUSCULAR; INTRAVENOUS
Status: DISCONTINUED | OUTPATIENT
Start: 2022-09-28 | End: 2022-09-29 | Stop reason: HOSPADM

## 2022-09-28 RX ORDER — MAGNESIUM HYDROXIDE 1200 MG/15ML
LIQUID ORAL AS NEEDED
Status: DISCONTINUED | OUTPATIENT
Start: 2022-09-28 | End: 2022-09-28 | Stop reason: HOSPADM

## 2022-09-28 RX ORDER — CHLORHEXIDINE GLUCONATE 0.12 MG/ML
15 RINSE ORAL ONCE
Status: COMPLETED | OUTPATIENT
Start: 2022-09-28 | End: 2022-09-28

## 2022-09-28 RX ORDER — NEOSTIGMINE METHYLSULFATE 1 MG/ML
INJECTION INTRAVENOUS AS NEEDED
Status: DISCONTINUED | OUTPATIENT
Start: 2022-09-28 | End: 2022-09-28

## 2022-09-28 RX ORDER — PROPOFOL 10 MG/ML
INJECTION, EMULSION INTRAVENOUS AS NEEDED
Status: DISCONTINUED | OUTPATIENT
Start: 2022-09-28 | End: 2022-09-28

## 2022-09-28 RX ORDER — ROCURONIUM BROMIDE 10 MG/ML
INJECTION, SOLUTION INTRAVENOUS AS NEEDED
Status: DISCONTINUED | OUTPATIENT
Start: 2022-09-28 | End: 2022-09-28

## 2022-09-28 RX ORDER — PRAVASTATIN SODIUM 40 MG
40 TABLET ORAL
Refills: 1 | Status: DISCONTINUED | OUTPATIENT
Start: 2022-09-28 | End: 2022-09-29 | Stop reason: HOSPADM

## 2022-09-28 RX ADMIN — NEOSTIGMINE METHYLSULFATE 3 MG: 1 INJECTION INTRAVENOUS at 10:04

## 2022-09-28 RX ADMIN — ROCURONIUM BROMIDE 10 MG: 50 INJECTION, SOLUTION INTRAVENOUS at 08:47

## 2022-09-28 RX ADMIN — SODIUM CHLORIDE: 0.9 INJECTION, SOLUTION INTRAVENOUS at 07:43

## 2022-09-28 RX ADMIN — FENTANYL CITRATE 50 MCG: 50 INJECTION INTRAMUSCULAR; INTRAVENOUS at 10:13

## 2022-09-28 RX ADMIN — ACETAMINOPHEN 325MG 975 MG: 325 TABLET ORAL at 23:07

## 2022-09-28 RX ADMIN — CHLORHEXIDINE GLUCONATE 0.12% ORAL RINSE 15 ML: 1.2 LIQUID ORAL at 06:38

## 2022-09-28 RX ADMIN — FENTANYL CITRATE 25 MCG: 50 INJECTION, SOLUTION INTRAMUSCULAR; INTRAVENOUS at 11:18

## 2022-09-28 RX ADMIN — FENTANYL CITRATE 100 MCG: 50 INJECTION INTRAMUSCULAR; INTRAVENOUS at 07:37

## 2022-09-28 RX ADMIN — PHENYLEPHRINE HYDROCHLORIDE 20 MCG/MIN: 10 INJECTION INTRAVENOUS at 08:00

## 2022-09-28 RX ADMIN — PRAVASTATIN SODIUM 40 MG: 40 TABLET ORAL at 16:25

## 2022-09-28 RX ADMIN — GLYCOPYRROLATE 0.4 MG: 0.2 INJECTION, SOLUTION INTRAMUSCULAR; INTRAVENOUS at 10:04

## 2022-09-28 RX ADMIN — ROCURONIUM BROMIDE 40 MG: 50 INJECTION, SOLUTION INTRAVENOUS at 07:37

## 2022-09-28 RX ADMIN — EPHEDRINE SULFATE 5 MG: 50 INJECTION, SOLUTION INTRAVENOUS at 09:59

## 2022-09-28 RX ADMIN — FENTANYL CITRATE 25 MCG: 50 INJECTION, SOLUTION INTRAMUSCULAR; INTRAVENOUS at 11:08

## 2022-09-28 RX ADMIN — PROPOFOL 130 MG: 10 INJECTION, EMULSION INTRAVENOUS at 07:37

## 2022-09-28 RX ADMIN — FENTANYL CITRATE 50 MCG: 50 INJECTION INTRAMUSCULAR; INTRAVENOUS at 09:50

## 2022-09-28 RX ADMIN — FENTANYL CITRATE 50 MCG: 50 INJECTION INTRAMUSCULAR; INTRAVENOUS at 08:20

## 2022-09-28 RX ADMIN — ACETAMINOPHEN 325MG 975 MG: 325 TABLET ORAL at 12:53

## 2022-09-28 RX ADMIN — ACETAMINOPHEN 325MG 975 MG: 325 TABLET ORAL at 17:43

## 2022-09-28 RX ADMIN — LIDOCAINE HYDROCHLORIDE 60 MG: 20 INJECTION, SOLUTION EPIDURAL; INFILTRATION; INTRACAUDAL; PERINEURAL at 07:37

## 2022-09-28 RX ADMIN — ASPIRIN 81 MG: 81 TABLET, COATED ORAL at 12:53

## 2022-09-28 RX ADMIN — HEPARIN SODIUM 5000 UNITS: 5000 INJECTION INTRAVENOUS; SUBCUTANEOUS at 14:38

## 2022-09-28 RX ADMIN — SODIUM CHLORIDE: 0.9 INJECTION, SOLUTION INTRAVENOUS at 09:54

## 2022-09-28 RX ADMIN — CEFAZOLIN SODIUM 2000 MG: 2 SOLUTION INTRAVENOUS at 07:37

## 2022-09-28 RX ADMIN — HEPARIN SODIUM 2000 UNITS: 1000 INJECTION INTRAVENOUS; SUBCUTANEOUS at 08:34

## 2022-09-28 RX ADMIN — HEPARIN SODIUM 5000 UNITS: 1000 INJECTION INTRAVENOUS; SUBCUTANEOUS at 08:23

## 2022-09-28 RX ADMIN — EPHEDRINE SULFATE 10 MG: 50 INJECTION, SOLUTION INTRAVENOUS at 08:05

## 2022-09-28 RX ADMIN — SODIUM CHLORIDE 125 ML/HR: 0.9 INJECTION, SOLUTION INTRAVENOUS at 06:38

## 2022-09-28 RX ADMIN — EPHEDRINE SULFATE 10 MG: 50 INJECTION, SOLUTION INTRAVENOUS at 08:27

## 2022-09-28 RX ADMIN — PROTAMINE SULFATE 10 MG: 10 INJECTION, SOLUTION INTRAVENOUS at 09:45

## 2022-09-28 RX ADMIN — HEPARIN SODIUM 5000 UNITS: 5000 INJECTION INTRAVENOUS; SUBCUTANEOUS at 22:42

## 2022-09-28 RX ADMIN — SODIUM CHLORIDE 5 MG/HR: 0.9 INJECTION, SOLUTION INTRAVENOUS at 09:49

## 2022-09-28 RX ADMIN — FENTANYL CITRATE 50 MCG: 50 INJECTION INTRAMUSCULAR; INTRAVENOUS at 07:56

## 2022-09-28 RX ADMIN — CLOPIDOGREL BISULFATE 75 MG: 75 TABLET ORAL at 12:53

## 2022-09-28 RX ADMIN — FENTANYL CITRATE 25 MCG: 50 INJECTION, SOLUTION INTRAMUSCULAR; INTRAVENOUS at 10:50

## 2022-09-28 RX ADMIN — ONDANSETRON 4 MG: 2 INJECTION INTRAMUSCULAR; INTRAVENOUS at 10:04

## 2022-09-28 RX ADMIN — SODIUM CHLORIDE 500 ML: 0.9 INJECTION, SOLUTION INTRAVENOUS at 14:38

## 2022-09-28 NOTE — INTERVAL H&P NOTE
H&P reviewed  After examining the patient I find no changes in the patients condition since the H&P had been written      Plan:  Right carotid endarterectomy with bovine patch angioplasty with intraoperative completion duplex imaging    Vitals:    09/28/22 0611   BP: 155/73   Pulse: 76   Resp: 16   Temp: 98 °F (36 7 °C)   SpO2: 97%

## 2022-09-28 NOTE — ASSESSMENT & PLAN NOTE
Lab Results   Component Value Date    EGFR 62 09/22/2022    EGFR 64 07/28/2022    EGFR 55 12/03/2021    CREATININE 0 87 09/22/2022    CREATININE 0 85 07/28/2022    CREATININE 0 97 12/03/2021     · Baseline Cr 0 8  · Discontinue IV fluids  · Avoid nephrotoxic agents  · Strict I&O

## 2022-09-28 NOTE — CONSULTS
[facility]  Progress Note - Bob Vegas 1940, 80 y o  female MRN: [id number]  Unit/Bed#: ICU 14 Encounter: [id number]  Primary Care Provider: Fidel Figueredo DO   Date and time admitted to hospital: 9/28/2022  5:48 AM    * Carotid stenosis, bilateral  Assessment & Plan  POD #0 R CEA with bovine patch  Vascular surgery primary  -140mmHg, PRN Cardene and Dima as needed for SBP goal  Serial neuro checks  Continue Plavix daily, received ASA x 1 dose postop in the ICU    CKD (chronic kidney disease) stage 3, GFR 30-59 ml/min [facility])  Assessment & Plan  Lab Results   Component Value Date    EGFR 62 09/22/2022    EGFR 64 07/28/2022    EGFR 55 12/03/2021    CREATININE 0 87 09/22/2022    CREATININE 0 85 07/28/2022    CREATININE 0 97 12/03/2021     Baseline Cr 0 8  Continue IVF per post op protocol, d/c in the AM when taking PO  Avoid nephrotoxic agents  Strict I&O    COPD (chronic obstructive pulmonary disease) ([facility] [address] )  Assessment & Plan  Unknown severity  No PTA inhalers on record  Monitor for s/s of distress or wheezing    Smoker  Assessment & Plan  Pt currently smokes 5-10 cigarettes per day  Smoking cessation counseling      -------------------------------------------------------------------------------------------------------------  Chief Complaint:     Post op from R CEA, reports surgical site pain    History of Present Illness   HX and PE limited by:   Bob Vegas is a 80 y o  female with past medical history significant for tobacco abuse, COPD, CKD3, HLD,  left carotid disease with multiple prior vascular procedures to the right lower extremity including common and external iliac artery stent and lower extremity claudication symptoms who follows routinely with Dr Amalia Giordano from vascular surgery  Patient had CT angio done which showed 90% stenosis of the right ICA  She was evaluated by Dr Amalia Giordano and scheduled for elective right CEA is which was performed today    Patient arrived to the critical care unit for postoperative monitoring hemodynamically stable not requiring any vasopressors or antihypertensive medications as and neurologically intact  History obtained from chart review and the patient   -------------------------------------------------------------------------------------------------------------  Dispo: Admit to Stepdown Level 1    Code Status: Prior  --------------------------------------------------------------------------------------------------------------  Review of Systems   Constitutional: Negative  HENT: Positive for dental problem  Eyes: Negative for visual disturbance  Respiratory: Negative for chest tightness, shortness of breath, wheezing and stridor  Cardiovascular: Negative for chest pain and palpitations  Gastrointestinal: Positive for nausea  Endocrine: Negative  Genitourinary: Negative  Musculoskeletal: Positive for neck pain  Allergic/Immunologic: Negative  Neurological: Negative for dizziness  Hematological: Negative  Psychiatric/Behavioral: Negative  All other systems reviewed and are negative  A 12-point, complete review of systems was reviewed and negative except as stated above     Physical Exam  Constitutional:       Appearance: Normal appearance  She is normal weight  She is not ill-appearing or toxic-appearing  Interventions: Nasal cannula in place  HENT:      Head: Normocephalic and atraumatic  Mouth/Throat:      Lips: Pink  Mouth: Mucous membranes are moist    Eyes:      Pupils: Pupils are equal, round, and reactive to light  Neck:     Cardiovascular:      Rate and Rhythm: Normal rate and regular rhythm  Pulses:           Radial pulses are 1+ on the right side and 1+ on the left side  Dorsalis pedis pulses are detected w/ Doppler on the right side and detected w/ Doppler on the left side  Posterior tibial pulses are 1+ on the right side and 1+ on the left side  Heart sounds: No murmur heard  No friction rub  No gallop  Pulmonary:      Effort: Pulmonary effort is normal       Breath sounds: No wheezing, rhonchi or rales  Abdominal:      General: Abdomen is flat  Palpations: Abdomen is soft  Tenderness: There is no abdominal tenderness  Genitourinary:     Comments: Evans to gravity  Musculoskeletal:      Right lower leg: No edema  Skin:     General: Skin is warm  Capillary Refill: Capillary refill takes 2 to 3 seconds  Nails: There is no clubbing  Neurological:      Mental Status: She is alert  Comments: PT AO x 3, no focal deficits, no lateralizing signes    CN II-XII intact  Strength equal bilaterally 5/5 UE and LE   Psychiatric:         Behavior: Behavior is cooperative        --------------------------------------------------------------------------------------------------------------  Vitals:   Vitals:    09/28/22 1311 09/28/22 1326 09/28/22 1341 09/28/22 1400   BP: (!) 86/41 (!) 81/49 (!) 96/41 (!) 79/40   Pulse: 56 (!) 52 (!) 50 (!) 50   Resp: 18 14 12 14   Temp:       TempSrc:       SpO2: 94% 93% 96% 94%   Weight:       Height:         Temp  Min: 96 5 °F (35 8 °C)  Max: 98 °F (36 7 °C)     Height: 5' 1" (154 9 cm)  Body mass index is 26 2 kg/m²  Laboratory and Diagnostics:  Results from last 7 days   Lab Units 09/22/22  0841   WBC Thousand/uL 8 82   HEMOGLOBIN g/dL 13 8   HEMATOCRIT % 42 2   PLATELETS Thousands/uL 234     Results from last 7 days   Lab Units 09/22/22  0841   SODIUM mmol/L 141   POTASSIUM mmol/L 4 4   CHLORIDE mmol/L 110*   CO2 mmol/L 24   ANION GAP mmol/L 7   BUN mg/dL 16   CREATININE mg/dL 0 87   CALCIUM mg/dL 9 4                       ABG:    VBG:          Micro:        EKG: none today  Imaging: I have personally reviewed pertinent reports          Historical Information   Past Medical History:   Diagnosis Date    Colon polyp     Food allergy     chocholate    Hyperlipidemia     Peripheral arterial occlusive disease ([address] )     Right humeral fracture     right poximal    Smoking     Wears dentures     full upper    Wears glasses      Past Surgical History:   Procedure Laterality Date    APPENDECTOMY      BREAST SURGERY      gilberto    COLONOSCOPY      HEMORRHOID SURGERY      IR AORTAGRAM WITH RUN-OFF  8/26/2019    PA RECONSTR TOTAL SHOULDER IMPLANT Right 12/8/2016    Procedure: ARTHROPLASTY SHOULDER REVERSE;  Surgeon: Valencia Craven MD;  Location: [facility] OR;  Service: Orthopedics    THROMBOENDARTERECTOMY      TONSILLECTOMY       Social History   Social History     Substance and Sexual Activity   Alcohol Use Not Currently    Alcohol/week: 2 0 standard drinks    Types: 2 Cans of beer per week    Comment: glass of beer twice a week     Social History     Substance and Sexual Activity   Drug Use No     Social History     Tobacco Use   Smoking Status Current Every Day Smoker    Packs/day: 0 50    Years: 64 00    Pack years: 32 00    Types: Cigarettes   Smokeless Tobacco Never Used   Tobacco Comment    smoked cigarette at 0400 9 28 22     Exercise History: ambulates independently  Family History:   Family History   Problem Relation Age of Onset    No Known Problems Mother     No Known Problems Father      I have reviewed this patient's family history and commented on sigificant items within the HPI      Medications:  Current Facility-Administered Medications   Medication Dose Route Frequency    acetaminophen (TYLENOL) tablet 975 mg  975 mg Oral Q6H [address]    [START ON 9/29/2022] calcium carbonate (OYSTER SHELL,OSCAL) 500 mg tablet 1 tablet  1 tablet Oral Daily With Breakfast    clopidogrel (PLAVIX) tablet 75 mg  75 mg Oral Daily    heparin (porcine) subcutaneous injection 5,000 Units  5,000 Units Subcutaneous Q8H [address]    labetalol (NORMODYNE) injection 5 mg  5 mg Intravenous Q15 Min PRN    And    hydrALAZINE (APRESOLINE) injection 15 mg  15 mg Intravenous Q15 Min PRN    And    niCARdipine (CARDENE) 25 mg (STANDARD CONCENTRATION) in sodium chloride 0 9% 250 mL  1-15 mg/hr Intravenous Continuous PRN    multi-electrolyte (PLASMALYTE-A/ISOLYTE-S PH 7 4) IV solution  75 mL/hr Intravenous Continuous    sodium chloride 0 9 % bolus 500 mL  500 mL Intravenous Once PRN    Followed by   Hermila Munguia ON 9/29/2022] phenylephrine (CHRISTOPHER-SYNEPHRINE) 50 mg (STANDARD CONCENTRATION) in sodium chloride 0 9% 250 mL   mcg/min Intravenous Continuous PRN    pravastatin (PRAVACHOL) tablet 40 mg  40 mg Oral Daily With Dinner    sodium chloride 0 9 % infusion  125 mL/hr Intravenous Continuous     Home medications:  Prior to Admission Medications   Prescriptions Last Dose Informant Patient Reported? Taking? Calcium Citrate-Vitamin D (CALCIUM + D PO) 9/25/2022 Self Yes Yes   Sig: Take 2 tablets by mouth daily at bedtime   aspirin 81 MG tablet 9/25/2022 Self Yes Yes   Sig: Take 81 mg by mouth daily Stopped taking 9/19/22 for sx   clopidogrel (PLAVIX) 75 mg tablet 9/19/2022  No Yes   Sig: Take 1 tablet (75 mg total) by mouth daily   Patient taking differently: Take 75 mg by mouth daily Stopped taking 9/19/22   simvastatin (ZOCOR) 40 mg tablet 9/25/2022 at Unknown time Self No Yes   Sig: Take 1 tablet (40 mg total) by mouth daily at bedtime      Facility-Administered Medications: None     Allergies:   Allergies   Allergen Reactions    Celecoxib GI Intolerance    Other Rash     Adhesive tape    Chocolate - Food Allergy     Cilostazol Edema     rash    Sutures  [Suture]     Wound Dressing Adhesive      Other reaction(s): Itching     ------------------------------------------------------------------------------------------------------------  Advance Directive and Living Will:      Power of :    POLST:    ------------------------------------------------------------------------------------------------------------  Care Time Delivered:   No Critical Care time spent       GABBY Najera        Portions of the record may have been created with voice recognition software  Occasional wrong word or "sound a like" substitutions may have occurred due to the inherent limitations of voice recognition software    Read the chart carefully and recognize, using context, where substitutions have occurred

## 2022-09-28 NOTE — ASSESSMENT & PLAN NOTE
Lab Results   Component Value Date    EGFR 62 09/22/2022    EGFR 64 07/28/2022    EGFR 55 12/03/2021    CREATININE 0 87 09/22/2022    CREATININE 0 85 07/28/2022    CREATININE 0 97 12/03/2021     Baseline Cr 0 8  Continue IVF per post op protocol, d/c in the AM when taking PO  Avoid nephrotoxic agents  Strict I&O

## 2022-09-28 NOTE — ANESTHESIA PREPROCEDURE EVALUATION
Procedure:  ENDARTERECTOMY ARTERY CAROTID w/bovine patch completion duplex imaging (Right Neck)    Relevant Problems   CARDIO   (+) Carotid stenosis, bilateral   (+) Hyperlipidemia      /RENAL   (+) Stage 3 chronic kidney disease, unspecified whether stage 3a or 3b CKD (HCC)      MUSCULOSKELETAL   (+) Degeneration of intervertebral disc   (+) Osteoarthritis      NEURO/PSYCH   (+) Atherosclerosis of native arteries of extremities with intermittent claudication, unspecified extremity (HCC)      PULMONARY   (+) Chronic obstructive pulmonary disease, unspecified (HCC)   (+) Smoker      Other   (+) Age-related nuclear cataract of both eyes        Physical Exam    Airway    Mallampati score: II  TM Distance: >3 FB  Neck ROM: limited     Dental       Cardiovascular  Rhythm: regular, Rate: normal,     Pulmonary  Breath sounds clear to auscultation,     Other Findings        Anesthesia Plan  ASA Score- 3     Anesthesia Type- general with ASA Monitors  Additional Monitors: arterial line  Airway Plan: ETT  Plan Factors-Exercise tolerance (METS): <4 METS  Chart reviewed  EKG reviewed  Existing labs reviewed  Patient summary reviewed  Patient is a current smoker  Patient instructed to abstain from smoking on day of procedure  Patient did not smoke on day of surgery  There is medical exclusion for perioperative obstructive sleep apnea risk education  Induction- intravenous  Postoperative Plan-     Informed Consent- Anesthetic plan and risks discussed with patient

## 2022-09-28 NOTE — OP NOTE
OPERATIVE REPORT  PATIENT NAME: Aryan Kurtz    :  1940  MRN: [id number]  Pt Location: [place] [facility]    SURGERY DATE: 2022    Surgeon(s) and Role:     * Dorie Olson MD - Primary     * Simon Bo DO - Fellow     * Mateusz Russo DO - Co-surgeon    Preop Diagnosis:  Carotid stenosis, bilateral [I65 23]    Post-Op Diagnosis Codes:     * Carotid stenosis, bilateral [I65 23]    Procedure(s) (LRB):  ENDARTERECTOMY ARTERY CAROTID w/bovine patch completion duplex imaging (Right)    Specimen(s):  * No specimens in log *    Estimated Blood Loss:   Minimal    Drains:  * No LDAs found *    Anesthesia Type:   General    Operative Indications:  Carotid stenosis, bilateral [I65 23]      Operative Findings:  See op report    Complications:   None      Procedure and Technique:    The patient was brought to the operating room and placed on the operating table in the supine position  The patient was identified by verbal confirmation and armband identification  The patient was prepped and draped in usual sterile fashion and a formal timeout was called  A longitudinal incision was made in a skin fold overlying the previously marked right carotid bifurcation  Dissection was carried through the subcutaneous tissue and platysma to the anterior border the sternocleidomastoid muscle  The Weitleaner retractor was inserted exposing the facial vein  The vein was doubly ligated and divided exposing the carotid artery  With careful mobilization the proximal and distal carotid artery were dissected and encircled with Vesseloops  The external carotid artery was separately encircled with a vessel loop as was the superior Thyroid artery  The hypoglossal nerve was carefully mobilized and the ansa hypoglossus was divided between clips enhancing exposure of the distal internal carotid artery  When adequate mobilization of the internal carotid artery was achieved the patient received 5000 units of heparin   After suitable delay traction was placed on the vessel loops and an anterolateral arteriotomy was created in the common carotid artery extending it into the internal carotid artery with the Wright scissors  The 8 [place] Shunt was inserted first into the distal internal carotid artery with good backbleeding noted  The proximal end was placed in the common carotid artery and the endarterectomy was then performed  Plaque was removed from the common external and internal carotid artery to a good endpoint  Distal intimal tacking suture was placed  A bovine pericardial patch was then sewn into the arteriotomy with running 6-0 Prolene suture  When the suture line was nearly completed the shunt was removed the vessels were flushed vigorously in both directions and the suture line tied down  Flow was restored first into the external and then the internal carotid artery  Duplex ultrasound was then brought to the field and insonation of the external/internal and common carotid arteries showed excellent waveforms and no technical defects  The wound edges were infiltrated with half percent Marcaine with epinephrine  Fibrillar  was placed in the operative field for added hemostasis  When hemostasis was secured closure was performed with 3-0 Monocryl for the platysma and 4-0 Monocryl subcuticular skin closure with a sterile bandage and tegaderm  The patient awoke moving all 4 extremities          Vascular Quality Initiative - Carotid Endarterectomy     Urgency:  Elective    Anesthesia: General Type: Conventional    Side: right    Patch Type: Bovine Pericardial     If Prosthetic, Patch : Margaret    Shunt: Yes- Routine    Skin Prep: Chlorhexidine    Drain: no  Heparin: yes    Protamine: yes      Dextran: no     Re-explore artery after closure: no    Total Procedure time: 130 min    Monitoring:   EEG: no   Stump Pressure: no   Other: no    Completion Study:   Doppler: no   Duplex: yes   Arteriogram: no    Concomitant Procedure:   Proximal Endovascular: no   Distal Endovascular: no   CABG: no   Other Arterial Op: no     I was present for the entire procedure and A qualified resident physician was not available    Patient Disposition:  PACU  and hemodynamically stable        SIGNATURE: Michelle Meadows MD  DATE: September 28, 2022  TIME: 10:20 AM

## 2022-09-28 NOTE — PROGRESS NOTES
Post- OP Note - Vascular Surgery   Wilman Vasquez 80 y o  female MRN: [id number]  Unit/Bed#: ICU 14 Encounter: [id number]    Assessment:  80 y o  female Day of Surgery s/p Procedure(s) (LRB):  ENDARTERECTOMY ARTERY CAROTID w/bovine patch completion duplex imaging (Right)      Plan:   Monitor Neurovascular exam   BP monitoring   Plavix, statin   Please Tigertext on call Vascular Surgery resident with any questions    Subjective/Objective     Subjective:   Patient alert and oriented  No chest pains or shortness of breath  Denies numbness tingling or weakness  Objective:    Blood pressure (!) 79/40, pulse (!) 48, temperature (!) 97 3 °F (36 3 °C), temperature source Tympanic, resp  rate 13, height 5' 1" (1 549 m), weight 62 9 kg (138 lb 10 7 oz), SpO2 97 %, not currently breastfeeding  ,Body mass index is 26 2 kg/m²  Physical Exam:   Gen:  NAD  HEENT: NCAT  MMM, no hematoma  CV: well perfused   Lungs: Normal respiratory effort  Abd: soft, nt/nd  Skin: warm/ dry  Extremities: no peripheral edema, no clubbing or cyanosis  Neuro:  AxO x3, sensorimotor intact, CN 2 through 12 intact, strength equal bilaterally

## 2022-09-28 NOTE — ASSESSMENT & PLAN NOTE
· POD #1 R CEA with bovine patch  · Vascular surgery primary  · -140mmHg, PRN Cardene and Dima as needed for SBP goal  · Serial neuro checks  · Continue Plavix daily, received ASA x 1 dose postop in the ICU

## 2022-09-28 NOTE — PLAN OF CARE
Problem: MOBILITY - ADULT  Goal: Maintain or return to baseline ADL function  Description: INTERVENTIONS:  -  Assess patient's ability to carry out ADLs; assess patient's baseline for ADL function and identify physical deficits which impact ability to perform ADLs (bathing, care of mouth/teeth, toileting, grooming, dressing, etc )  - Assess/evaluate cause of self-care deficits   - Assess range of motion  - Assess patient's mobility; develop plan if impaired  - Assess patient's need for assistive devices and provide as appropriate  - Encourage maximum independence but intervene and supervise when necessary  - Involve family in performance of ADLs  - Assess for home care needs following discharge   - Consider OT consult to assist with ADL evaluation and planning for discharge  - Provide patient education as appropriate  Outcome: Progressing  Goal: Maintains/Returns to pre admission functional level  Description: INTERVENTIONS:  - Perform BMAT or MOVE assessment daily    - Set and communicate daily mobility goal to care team and patient/family/caregiver     - Collaborate with rehabilitation services on mobility goals if consulted  - Out of bed for toileting  - Record patient progress and toleration of activity level   Outcome: Progressing     Problem: PAIN - ADULT  Goal: Verbalizes/displays adequate comfort level or baseline comfort level  Description: Interventions:  - Encourage patient to monitor pain and request assistance  - Assess pain using appropriate pain scale  - Administer analgesics based on type and severity of pain and evaluate response  - Implement non-pharmacological measures as appropriate and evaluate response  - Consider cultural and social influences on pain and pain management  - Notify physician/advanced practitioner if interventions unsuccessful or patient reports new pain  Outcome: Progressing     Problem: INFECTION - ADULT  Goal: Absence or prevention of progression during hospitalization  Description: INTERVENTIONS:  - Assess and monitor for signs and symptoms of infection  - Monitor lab/diagnostic results  - Monitor all insertion sites, i e  indwelling lines, tubes, and drains  - Monitor endotracheal if appropriate and nasal secretions for changes in amount and color  - [place] appropriate cooling/warming therapies per order  - Administer medications as ordered  - Instruct and encourage patient and family to use good hand hygiene technique  - Identify and instruct in appropriate isolation precautions for identified infection/condition  Outcome: Progressing  Goal: Absence of fever/infection during neutropenic period  Description: INTERVENTIONS:  - Monitor WBC    Outcome: Progressing     Problem: SAFETY ADULT  Goal: Maintain or return to baseline ADL function  Description: INTERVENTIONS:  -  Assess patient's ability to carry out ADLs; assess patient's baseline for ADL function and identify physical deficits which impact ability to perform ADLs (bathing, care of mouth/teeth, toileting, grooming, dressing, etc )  - Assess/evaluate cause of self-care deficits   - Assess range of motion  - Assess patient's mobility; develop plan if impaired  - Assess patient's need for assistive devices and provide as appropriate  - Encourage maximum independence but intervene and supervise when necessary  - Involve family in performance of ADLs  - Assess for home care needs following discharge   - Consider OT consult to assist with ADL evaluation and planning for discharge  - Provide patient education as appropriate  Outcome: Progressing  Goal: Maintains/Returns to pre admission functional level  Description: INTERVENTIONS:  - Perform BMAT or MOVE assessment daily    - Set and communicate daily mobility goal to care team and patient/family/caregiver     - Collaborate with rehabilitation services on mobility goals if consulted  - Out of bed for toileting  - Record patient progress and toleration of activity level   Outcome: Progressing  Goal: Patient will remain free of falls  Description: INTERVENTIONS:  - Educate patient/family on patient safety including physical limitations  - Instruct patient to call for assistance with activity   - Consult OT/PT to assist with strengthening/mobility   - Keep Call bell within reach  - Keep bed low and locked with side rails adjusted as appropriate  - Keep care items and personal belongings within reach  - Initiate and maintain comfort rounds  - Make Fall Risk Sign visible to staff  - Apply yellow socks and bracelet for high fall risk patients  - Consider moving patient to room near nurses station  Outcome: Progressing     Problem: DISCHARGE PLANNING  Goal: Discharge to home or other facility with appropriate resources  Description: INTERVENTIONS:  - Identify barriers to discharge w/patient and caregiver  - Arrange for needed discharge resources and transportation as appropriate  - Identify discharge learning needs (meds, wound care, etc )  - Arrange for interpretive services to assist at discharge as needed  - Refer to Case Management Department for coordinating discharge planning if the patient needs post-hospital services based on physician/advanced practitioner order or complex needs related to functional status, cognitive ability, or social support system  Outcome: Progressing     Problem: Knowledge Deficit  Goal: Patient/family/caregiver demonstrates understanding of disease process, treatment plan, medications, and discharge instructions  Description: Complete learning assessment and assess knowledge base    Interventions:  - Provide teaching at level of understanding  - Provide teaching via preferred learning methods  Outcome: Progressing

## 2022-09-28 NOTE — ASSESSMENT & PLAN NOTE
POD #0 R CEA with bovine patch  Vascular surgery primary  -140mmHg, PRN Cardene and Dima as needed for SBP goal  Serial neuro checks  Continue Plavix daily, received ASA x 1 dose postop in the ICU

## 2022-09-28 NOTE — ANESTHESIA POSTPROCEDURE EVALUATION
Post-Op Assessment Note    CV Status:  Stable    Pain management: adequate     Mental Status:  Alert and awake   Hydration Status:  Euvolemic   PONV Controlled:  Controlled   Airway Patency:  Patent      Post Op Vitals Reviewed: Yes      Staff: Anesthesiologist         No complications documented      BP      Temp     Pulse     Resp      SpO2      /56   Pulse 58   Temp (!) 97 1 °F (36 2 °C)   Resp 12   Ht 5' 1" (1 549 m)   Wt 62 9 kg (138 lb 10 7 oz)   SpO2 94%   BMI 26 20 kg/m²

## 2022-09-29 ENCOUNTER — TELEPHONE (OUTPATIENT)
Dept: VASCULAR SURGERY | Facility: CLINIC | Age: 82
End: 2022-09-29

## 2022-09-29 ENCOUNTER — TRANSITIONAL CARE MANAGEMENT (OUTPATIENT)
Dept: FAMILY MEDICINE CLINIC | Facility: CLINIC | Age: 82
End: 2022-09-29

## 2022-09-29 VITALS
TEMPERATURE: 99 F | BODY MASS INDEX: 26.18 KG/M2 | DIASTOLIC BLOOD PRESSURE: 49 MMHG | SYSTOLIC BLOOD PRESSURE: 90 MMHG | WEIGHT: 138.67 LBS | RESPIRATION RATE: 18 BRPM | HEIGHT: 61 IN | HEART RATE: 62 BPM | OXYGEN SATURATION: 88 %

## 2022-09-29 LAB
ANION GAP SERPL CALCULATED.3IONS-SCNC: 9 MMOL/L (ref 4–13)
APTT PPP: 46 SECONDS (ref 23–37)
BUN SERPL-MCNC: 17 MG/DL (ref 5–25)
CALCIUM SERPL-MCNC: 8.1 MG/DL (ref 8.3–10.1)
CHLORIDE SERPL-SCNC: 109 MMOL/L (ref 96–108)
CO2 SERPL-SCNC: 22 MMOL/L (ref 21–32)
CREAT SERPL-MCNC: 0.98 MG/DL (ref 0.6–1.3)
ERYTHROCYTE [DISTWIDTH] IN BLOOD BY AUTOMATED COUNT: 13.8 % (ref 11.6–15.1)
GFR SERPL CREATININE-BSD FRML MDRD: 53 ML/MIN/1.73SQ M
GLUCOSE SERPL-MCNC: 107 MG/DL (ref 65–140)
HCT VFR BLD AUTO: 33.1 % (ref 34.8–46.1)
HGB BLD-MCNC: 11 G/DL (ref 11.5–15.4)
INR PPP: 1.05 (ref 0.84–1.19)
MCH RBC QN AUTO: 34.9 PG (ref 26.8–34.3)
MCHC RBC AUTO-ENTMCNC: 33.2 G/DL (ref 31.4–37.4)
MCV RBC AUTO: 105 FL (ref 82–98)
PLATELET # BLD AUTO: 168 THOUSANDS/UL (ref 149–390)
PMV BLD AUTO: 9.8 FL (ref 8.9–12.7)
POTASSIUM SERPL-SCNC: 3.8 MMOL/L (ref 3.5–5.3)
PROTHROMBIN TIME: 13.7 SECONDS (ref 11.6–14.5)
RBC # BLD AUTO: 3.15 MILLION/UL (ref 3.81–5.12)
SODIUM SERPL-SCNC: 140 MMOL/L (ref 135–147)
WBC # BLD AUTO: 7.56 THOUSAND/UL (ref 4.31–10.16)

## 2022-09-29 PROCEDURE — 85027 COMPLETE CBC AUTOMATED: CPT | Performed by: NURSE PRACTITIONER

## 2022-09-29 PROCEDURE — 85730 THROMBOPLASTIN TIME PARTIAL: CPT | Performed by: NURSE PRACTITIONER

## 2022-09-29 PROCEDURE — 85610 PROTHROMBIN TIME: CPT | Performed by: NURSE PRACTITIONER

## 2022-09-29 PROCEDURE — NC001 PR NO CHARGE: Performed by: SURGERY

## 2022-09-29 PROCEDURE — 80048 BASIC METABOLIC PNL TOTAL CA: CPT | Performed by: NURSE PRACTITIONER

## 2022-09-29 PROCEDURE — 99233 SBSQ HOSP IP/OBS HIGH 50: CPT | Performed by: NURSE PRACTITIONER

## 2022-09-29 RX ORDER — HYDROCODONE BITARTRATE AND ACETAMINOPHEN 5; 325 MG/1; MG/1
1 TABLET ORAL EVERY 6 HOURS PRN
Qty: 10 TABLET | Refills: 0 | Status: SHIPPED | OUTPATIENT
Start: 2022-09-29 | End: 2022-10-09

## 2022-09-29 RX ADMIN — CLOPIDOGREL BISULFATE 75 MG: 75 TABLET ORAL at 08:07

## 2022-09-29 RX ADMIN — ACETAMINOPHEN 325MG 975 MG: 325 TABLET ORAL at 05:43

## 2022-09-29 RX ADMIN — CALCIUM 1 TABLET: 500 TABLET ORAL at 08:07

## 2022-09-29 RX ADMIN — HEPARIN SODIUM 5000 UNITS: 5000 INJECTION INTRAVENOUS; SUBCUTANEOUS at 05:43

## 2022-09-29 NOTE — NURSING NOTE
Patient discharge instruction reviewed and patient verbalized understanding  All questions answered to patient satisfaction  All belongings brought home with patient  Importance of follow up appointments reviewed

## 2022-09-29 NOTE — UTILIZATION REVIEW
Initial Clinical Review    Elective inpatient surgical procedure  Age/Sex: 80 y o  female  Surgery Date:  Normal sinus rhythm at a rate of 64 beats per minute   Lead V3 he is poorly placed   Normal EKG  Procedure: ENDARTERECTOMY ARTERY CAROTID w/bovine patch completion duplex imaging (Right)     Anesthesia: general   Operative Findings: " The proximal end was placed in the common carotid artery and the endarterectomy was then performed  Plaque was removed from the common external and internal carotid artery to a good endpoint  Distal intimal tacking suture was placed  A bovine pericardial patch was then sewn into the arteriotomy with running 6-0 Prolene suture  When the suture line was nearly completed the shunt was removed the vessels were flushed vigorously in both directions and the suture line tied down  Flow was restored first into the external and then the internal carotid artery " Duplex ultrasound was then brought to the field and insonation of the external/internal and common carotid arteries showed excellent waveforms and no technical defects    POD#1 Progress Note:   Critical care Post OP did not req any pressors; cot neuro checks, received IVF for CKD stage 3 & monitoring Cr  Vascular Surgery   No acute events overnight  Denies numbness or weakness  No chest pain shortness of breath  Per nursing staff patient did well has been up out of bed  Systolic blood pressures have been 100 or above she has not required any gtt per parameters  Received 500 cc saline bolus per order set earlier in the evening last night   DC Arterial line, cont ASA, statin, Plavix  Admission Orders: Date/Time/Statement:   Admission Orders (From admission, onward)     Ordered        09/28/22 6745  Inpatient Admission  Once                      Orders Placed This Encounter   Procedures    Inpatient Admission     Standing Status:   Standing     Number of Occurrences:   1     Order Specific Question:   Level of Care     Answer: Critical Care [15]     Order Specific Question:   Estimated length of stay     Answer:   Inpatient Only Surgery     Vital Signs: BP (!) 90/49   Pulse 62   Temp 99 °F (37 2 °C) (Oral)   Resp 18   Ht 5' 1" (1 549 m)   Wt 62 9 kg (138 lb 10 7 oz)   SpO2 (!) 88%   BMI 26 20 kg/m²     Pertinent Labs/Diagnostic Test Results:   VAS intra-op ultrasound CEA    9/28 Duplex ultrasound was then brought to the field and insonation of the external/internal and common carotid arteries showed excellent waveforms and no technical defects           Results from last 7 days   Lab Units 09/29/22  0404   WBC Thousand/uL 7 56   HEMOGLOBIN g/dL 11 0*   HEMATOCRIT % 33 1*   PLATELETS Thousands/uL 168         Results from last 7 days   Lab Units 09/29/22  0404   SODIUM mmol/L 140   POTASSIUM mmol/L 3 8   CHLORIDE mmol/L 109*   CO2 mmol/L 22   ANION GAP mmol/L 9   BUN mg/dL 17   CREATININE mg/dL 0 98   EGFR ml/min/1 73sq m 53   CALCIUM mg/dL 8 1*             Results from last 7 days   Lab Units 09/29/22  0404   GLUCOSE RANDOM mg/dL 107             No results found for: BETA-HYDROXYBUTYRATE                           Results from last 7 days   Lab Units 09/29/22  0404   PROTIME seconds 13 7   INR  1 05   PTT seconds 46*          Arterial line monitoring  Cardio pulmonary monitoring  Neuro checks  Diet: regular  Mobility: OOB  DVT Prophylaxis:  SCD,  intra OP 5000 units of heparin SQ & Q 8hr    Medications/Pain Control:   Scheduled Medications:  acetaminophen, 975 mg, Oral, Q6H THOMAS  calcium carbonate, 1 tablet, Oral, Daily With Breakfast  clopidogrel, 75 mg, Oral, Daily  heparin (porcine), 5,000 Units, Subcutaneous, Q8H THOMAS  pravastatin, 40 mg, Oral, Daily With Dinner      Continuous IV Infusions:  niCARdipine, 1-15 mg/hr, Intravenous, Continuous PRN  phenylephine,  mcg/min, Intravenous, Continuous PRN  sodium chloride, 125 mL/hr, Intravenous, Continuous      PRN Meds:  labetalol, 5 mg, Intravenous, Q15 Min PRN   And  hydrALAZINE, 15 mg, Intravenous, Q15 Min PRN   And  niCARdipine, 1-15 mg/hr, Intravenous, Continuous PRN  phenylephine,  mcg/min, Intravenous, Continuous PRN    Network Utilization Review Department  ATTENTION: Please call with any questions or concerns to [phone] and carefully listen to the prompts so that you are directed to the right person  All voicemails are confidential   Ena Grand all requests for admission clinical reviews, approved or denied determinations and any other requests to dedicated fax number below belonging to the campus where the patient is receiving treatment   List of dedicated fax numbers for the Facilities:  [address] DENIALS (Administrative/Medical Necessity) [phone]   [address] (Maternity/NICU/Pediatrics) [phone]   [address]  [address] 150 Medical [place] [address] [address] [place] [address] [place] Mariha Warner 1481 [address] [place] [address] [phone]

## 2022-09-29 NOTE — TELEPHONE ENCOUNTER
Informed by James Cody PA-C at 7:35 am via tiger text, that patient is adamant about leaving as soon as she is able to and Cain Reyes was going to work on patient's discharge  Informed her that if patient was discharged prior to seeing patient, I would reach out to her via phone to provide education  Cain Reyes was agreeable to this plan  Contacted ICU at 8:40 am and spoke with nurse Lauri Sequeira who stated that patient has already left  Attempted to contact patient via phone to provide post op education as patient was discharged prior to being seen in hospital by Nurse Navigator  Left her a message to return call to review post op education

## 2022-09-29 NOTE — UTILIZATION REVIEW
Notification of Discharge   This is a Notification of Discharge from our facility [address]  Please be advised that this patient has been discharge from our facility  Below you will find the admission and discharge date and time including the patients disposition  UTILIZATION REVIEW CONTACT:  Ivonne Miller  Utilization   Network Utilization Review Department  Phone: [phone] carefully listen to the prompts  All voicemails are confidential   Email: [email or web address]     PHYSICIAN ADVISORY SERVICES:  FOR [id number] REVIEW - MEDICAL NECESSITY DENIAL  Phone: [phone]  Fax: [phone]  Email: [email or web address]     PRESENTATION DATE: 9/28/2022  5:48 AM  OBERVATION ADMISSION DATE:   INPATIENT ADMISSION DATE: 9/28/22  7:45 AM   DISCHARGE DATE: 9/29/2022  8:50 AM  DISPOSITION: Home/Self Care Home/Self Care      IMPORTANT INFORMATION:  Send all requests for admission clinical reviews, approved or denied determinations and any other requests to dedicated fax number below belonging to the campus where the patient is receiving treatment   List of dedicated fax numbers:  [address] DENIALS (Administrative/Medical Necessity) [phone]   [address] (Maternity/NICU/Pediatrics) [phone]   [facility] [phone]   130 [facility] [phone]   [address] [place] [address] [phone]   2000 [facility] [address] [place] [address],[address] [phone]   [facility]  [phone]   [address] [facility] [address], [address]  2401 [facility] 1000 [facility] [phone]

## 2022-09-29 NOTE — DISCHARGE INSTRUCTIONS
DISCHARGE INSTRUCTIONS  CAROTID ENDARTERECTOMY    ACTIVITY:  Limit your physical activity to walking for the first week and then increase your activity as tolerated  Walking up steps and normal activities may be resumed as you feel ready  Avoid strenuous activity such as vigorous exercise  Avoid heavy lifting (do not lift more than 15 pounds) for the first four weeks after surgery  You should not drive a car for at least one week following discharge from the hospital and you are off all narcotic pain medication  You may ride in a car  If you have had a stroke or mini-stroke, please consult with your neurologist prior to driving  DO NOT START OR RESUME ANY PREVIOUSLY PLANNED THERAPY (PHYSICAL, CARDIAC, REHAB, ETC) UNTIL YOU DISCUSS WITH YOUR SURGEON AND THEY FEEL IT IS SAFE TO ENGAGE IN THERAPY  DIET:  Resume your normal diet  Good nutrition is important for healing of your incision  You can expect to have a sore throat and trouble swallowing after surgery which should improve quickly  If you feel like you are choking, please call your doctor  RECURRENT SYMPTOMS: If you develop any new numbness, weakness, vision changes, drooping of the face, or difficulty with speech after discharge, CALL 911 or go to the nearest Emergency department immediately  INCISION SITE:  You may have surgical glue at your incision site  There are stitches present under the skin which will absorb on their own  The glue is used to cover the incision, assist in closure, and prevent contamination  This adhesive will darken and peel away on its own within one to two weeks  Do not pick at it  If you have a bandage, please remove this on the second day after surgery  You should shower daily  Wash incision daily with soap and water, but do not rub or scrub the incision; rinse thoroughly and pat dry  Do not bathe in a tub or swim for the first 4 weeks following surgery or if you have any open wounds    It is normal to have some bruising, swelling or discoloration around the incision  IF increasing redness, pain, bleeding or a bulge develops, call our office immediately  If you notice any active bleeding at the site, apply pressure to the site and call our office ([phone]) or [phone]  FOLLOW UP STUDIES: Doppler ultrasound studies are very important to your post-operative care  Your surgeon will arrange them at your first postoperative visit  The first study will be 3 months after surgery  FOLLOW UP APPOINTMENTS:  Making and keeping follow up appointments and ultrasound tests are important to your recovery  If you have difficulty making it to or keeping your follow up appointments, call the office  If you have increased pain, fever >101 5, increased drainage, redness or a bad smell at your surgery site, new coldness/numbness of your arm or leg, please call us immediately and GO directly to the ER  PLEASE CALL THE OFFICE IF YOU HAVE ANY QUESTIONS  [phone]  [phone]  [address] [place] [address] , [address], [place], [address] [place] [address] Rd  600 [facility] 20, 500 15[facility], 210 [facility]  [address]   2707 [facility], [facility], [address] [place] [address]  611 [facility], [facility],[address], [address] [place] [address], [address] [facility] [address]  Ellyn Mendieta 62, 1st Floor, David Woods 34  1 [facility],5Th Floor West, [address] [facility], [address] E [facility], 830 [facility]  1307 [facility], 8614 [facility], [address]  One [facility], 532 [facility], [facility],[address], Frederick Thomason 6  201 [facility], Jarrell Carranza, [address]

## 2022-09-29 NOTE — PROGRESS NOTES
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) now s/p R CEA 9/28  Plan:   Hopeful d/c today   D/c isaac   Neuro Checks   Monitor Incisions site   Aspirin, Plavix, statin   -140 goal   Please TigerText on call Vascular Surgery Resident with any questions     Subjective/Objective     Subjective:   No acute events overnight  Denies numbness or weakness  No chest pain shortness of breath  Per nursing staff patient did well has been up out of bed  Systolic blood pressures have been 100 or above she has not required any gtt per parameters  Received 500 cc saline bolus per order set earlier in the evening last night  Pertinent review of systems as above  All other review of systems negative  Objective:    Blood pressure (!) 99/41, pulse 60, temperature (!) 97 3 °F (36 3 °C), temperature source Tympanic, resp  rate 19, height 5' 1" (1 549 m), weight 62 9 kg (138 lb 10 7 oz), SpO2 (!) 88 %, not currently breastfeeding  ,Body mass index is 26 2 kg/m²  Intake/Output Summary (Last 24 hours) at 9/29/2022 3861  Last data filed at 9/29/2022 0001  Gross per 24 hour   Intake 3651 25 ml   Output 175 ml   Net 3476 25 ml       Invasive Devices  Report    Peripheral Intravenous Line  Duration           Peripheral IV 09/28/22 Left;Ventral (anterior) Forearm <1 day                Physical Exam:   Gen:  NAD  HEENT: NCAT  MMM, no hematoma  CV: well perfused   Lungs: Normal respiratory effort  Abd: soft, nt/nd  Skin: warm/ dry  Extremities: no peripheral edema, no clubbing or cyanosis  Neuro:  AxO x3, sensorimotor intact, equal strength b/l, CN 2-12 grossly intact      Results from last 7 days   Lab Units 09/29/22  0404 09/22/22  0841   WBC Thousand/uL 7 56 8 82   HEMOGLOBIN g/dL 11 0* 13 8   HEMATOCRIT % 33 1* 42 2   PLATELETS Thousands/uL 168 234     Results from last 7 days   Lab Units 09/29/22  0404 09/22/22  0841   POTASSIUM mmol/L 3 8 4 4   CHLORIDE mmol/L 109* 110*   CO2 mmol/L 22 24   BUN mg/dL 17 16   CREATININE mg/dL 0 98 0 87   CALCIUM mg/dL 8 1* 9 4     Results from last 7 days   Lab Units 09/29/22  0404   INR  1 05   PTT seconds 46*        I have personally reviewed pertinent films in PACS      Medications:   Scheduled Meds:  Current Facility-Administered Medications   Medication Dose Route Frequency Provider Last Rate    acetaminophen  975 mg Oral Q6H [facility] Nesha Carton, DO      calcium carbonate  1 tablet Oral Daily With Breakfast Nesha Carton, DO      clopidogrel  75 mg Oral Daily Nesha Carton, DO      heparin (porcine)  5,000 Units Subcutaneous [facility] Nesha Carton, [place]      labetalol  5 mg Intravenous B63 Min PRN Nesha Carton, DO      And    hydrALAZINE  15 mg Intravenous Y06 Min PRN Nesha Carton, DO      And    niCARdipine  1-15 mg/hr Intravenous Continuous PRN Nesha Carton, DO      phenylephine   mcg/min Intravenous Continuous PRN Nesha Carton, DO      pravastatin  40 mg Oral Daily With [address], DO      sodium chloride  125 mL/hr Intravenous Continuous Adair Parlin,  mL/hr (09/28/22 1021)     Continuous Infusions:niCARdipine, 1-15 mg/hr  phenylephine,  mcg/min  sodium chloride, 125 mL/hr, Last Rate: 125 mL/hr (09/28/22 1021)      PRN Meds:  labetalol, 5 mg, Q15 Min PRN   And  hydrALAZINE, 15 mg, Q15 Min PRN   And  niCARdipine, 1-15 mg/hr, Continuous PRN  phenylephine,  mcg/min, Continuous PRN      VTE Pharmacologic Prophylaxis: Heparin  VTE Mechanical Prophylaxis: sequential compression device

## 2022-09-29 NOTE — PLAN OF CARE
Problem: MOBILITY - ADULT  Goal: Maintain or return to baseline ADL function  Description: INTERVENTIONS:  -  Assess patient's ability to carry out ADLs; assess patient's baseline for ADL function and identify physical deficits which impact ability to perform ADLs (bathing, care of mouth/teeth, toileting, grooming, dressing, etc )  - Assess/evaluate cause of self-care deficits   - Assess range of motion  - Assess patient's mobility; develop plan if impaired  - Assess patient's need for assistive devices and provide as appropriate  - Encourage maximum independence but intervene and supervise when necessary  - Involve family in performance of ADLs  - Assess for home care needs following discharge   - Consider OT consult to assist with ADL evaluation and planning for discharge  - Provide patient education as appropriate  Outcome: Progressing  Goal: Maintains/Returns to pre admission functional level  Description: INTERVENTIONS:  - Perform BMAT or MOVE assessment daily    - Set and communicate daily mobility goal to care team and patient/family/caregiver     - Collaborate with rehabilitation services on mobility goals if consulted  - Out of bed for toileting  - Record patient progress and toleration of activity level   Outcome: Progressing     Problem: PAIN - ADULT  Goal: Verbalizes/displays adequate comfort level or baseline comfort level  Description: Interventions:  - Encourage patient to monitor pain and request assistance  - Assess pain using appropriate pain scale  - Administer analgesics based on type and severity of pain and evaluate response  - Implement non-pharmacological measures as appropriate and evaluate response  - Consider cultural and social influences on pain and pain management  - Notify physician/advanced practitioner if interventions unsuccessful or patient reports new pain  Outcome: Progressing     Problem: INFECTION - ADULT  Goal: Absence or prevention of progression during hospitalization  Description: INTERVENTIONS:  - Assess and monitor for signs and symptoms of infection  - Monitor lab/diagnostic results  - Monitor all insertion sites, i e  indwelling lines, tubes, and drains  - Monitor endotracheal if appropriate and nasal secretions for changes in amount and color  - Avinger appropriate cooling/warming therapies per order  - Administer medications as ordered  - Instruct and encourage patient and family to use good hand hygiene technique  - Identify and instruct in appropriate isolation precautions for identified infection/condition  Outcome: Progressing  Goal: Absence of fever/infection during neutropenic period  Description: INTERVENTIONS:  - Monitor WBC    Outcome: Progressing     Problem: SAFETY ADULT  Goal: Maintain or return to baseline ADL function  Description: INTERVENTIONS:  -  Assess patient's ability to carry out ADLs; assess patient's baseline for ADL function and identify physical deficits which impact ability to perform ADLs (bathing, care of mouth/teeth, toileting, grooming, dressing, etc )  - Assess/evaluate cause of self-care deficits   - Assess range of motion  - Assess patient's mobility; develop plan if impaired  - Assess patient's need for assistive devices and provide as appropriate  - Encourage maximum independence but intervene and supervise when necessary  - Involve family in performance of ADLs  - Assess for home care needs following discharge   - Consider OT consult to assist with ADL evaluation and planning for discharge  - Provide patient education as appropriate  Outcome: Progressing  Goal: Maintains/Returns to pre admission functional level  Description: INTERVENTIONS:  - Perform BMAT or MOVE assessment daily    - Set and communicate daily mobility goal to care team and patient/family/caregiver     - Collaborate with rehabilitation services on mobility goals if consulted  - Out of bed for toileting  - Record patient progress and toleration of activity level   Outcome: Progressing  Goal: Patient will remain free of falls  Description: INTERVENTIONS:  - Educate patient/family on patient safety including physical limitations  - Instruct patient to call for assistance with activity   - Consult OT/PT to assist with strengthening/mobility   - Keep Call bell within reach  - Keep bed low and locked with side rails adjusted as appropriate  - Keep care items and personal belongings within reach  - Initiate and maintain comfort rounds  - Make Fall Risk Sign visible to staff  - Apply yellow socks and bracelet for high fall risk patients  - Consider moving patient to room near nurses station  Outcome: Progressing     Problem: DISCHARGE PLANNING  Goal: Discharge to home or other facility with appropriate resources  Description: INTERVENTIONS:  - Identify barriers to discharge w/patient and caregiver  - Arrange for needed discharge resources and transportation as appropriate  - Identify discharge learning needs (meds, wound care, etc )  - Arrange for interpretive services to assist at discharge as needed  - Refer to Case Management Department for coordinating discharge planning if the patient needs post-hospital services based on physician/advanced practitioner order or complex needs related to functional status, cognitive ability, or social support system  Outcome: Progressing     Problem: Knowledge Deficit  Goal: Patient/family/caregiver demonstrates understanding of disease process, treatment plan, medications, and discharge instructions  Description: Complete learning assessment and assess knowledge base    Interventions:  - Provide teaching at level of understanding  - Provide teaching via preferred learning methods  Outcome: Progressing     Problem: Potential for Falls  Goal: Patient will remain free of falls  Description: INTERVENTIONS:  - Educate patient/family on patient safety including physical limitations  - Instruct patient to call for assistance with activity   - Consult OT/PT to assist with strengthening/mobility   - Keep Call bell within reach  - Keep bed low and locked with side rails adjusted as appropriate  - Keep care items and personal belongings within reach  - Initiate and maintain comfort rounds  - Make Fall Risk Sign visible to staff  - Apply yellow socks and bracelet for high fall risk patients  - Consider moving patient to room near nurses station  Outcome: Progressing

## 2022-09-29 NOTE — PROGRESS NOTES
[facility]  Progress Note - Dwayne Lawrence 1940, 80 y o  female MRN: [id number]  Unit/Bed#: ICU 14 Encounter: [id number]  Primary Care Provider: Bridgette Green DO   Date and time admitted to hospital: 9/28/2022  5:48 AM    * Carotid stenosis, bilateral  Assessment & Plan  · POD #1 R CEA with bovine patch  · Vascular surgery primary  · -140mmHg, PRN Cardene and Dima as needed for SBP goal  · Serial neuro checks  · Continue Plavix daily, received ASA x 1 dose postop in the ICU    CKD (chronic kidney disease) stage 3, GFR 30-59 ml/min [facility])  Assessment & Plan  Lab Results   Component Value Date    EGFR 62 09/22/2022    EGFR 64 07/28/2022    EGFR 55 12/03/2021    CREATININE 0 87 09/22/2022    CREATININE 0 85 07/28/2022    CREATININE 0 97 12/03/2021     · Baseline Cr 0 8  · Discontinue IV fluids  · Avoid nephrotoxic agents  · Strict I&O    COPD (chronic obstructive pulmonary disease) (Former[facility])  Assessment & Plan  · Unknown severity  · No PTA inhalers on record  · Monitor for s/s of distress or wheezing    Smoker  Assessment & Plan  · Pt currently smokes 5-10 cigarettes per day  · Smoking cessation counseling        ----------------------------------------------------------------------------------------  HPI/24hr events: No events overnight    Patient appropriate for transfer out of the ICU today?: Patient does not meet criteria for ICU Follow-up Clinic; referral has not been made  Disposition: Discharge to home   Code Status: Prior  ---------------------------------------------------------------------------------------  SUBJECTIVE  No complaints  Ready to go home    Review of Systems   All other systems reviewed and are negative      Review of systems was reviewed and negative unless stated above in HPI/24-hour events   ---------------------------------------------------------------------------------------  OBJECTIVE    Vitals   Vitals:    09/29/22 0200 09/29/22 0300 09/29/22 0400 22 0500   BP:    (!) 99/41   BP Location:    Left arm   Pulse: 56 68 64 60   Resp: 19 22 (!) 25 19   Temp:       TempSrc:       SpO2: 92% 90% 91% (!) 88%   Weight:       Height:         Temp (24hrs), Av 2 °F (36 2 °C), Min:96 5 °F (35 8 °C), Max:98 °F (36 7 °C)  Current: Temperature: (!) 97 3 °F (36 3 °C)  Arterial Line BP: 128/42  Arterial Line MAP (mmHg): 70 mmHg    Respiratory:  SpO2: SpO2: (!) 88 %    Nasal Cannula O2 Flow Rate (L/min): 2 L/min    Invasive/non-invasive ventilation settings   Respiratory  Report   Lab Data (Last 4 hours)    None         O2/Vent Data (Last 4 hours)    None                Physical Exam  Vitals reviewed  Constitutional:       General: She is not in acute distress  Appearance: She is not ill-appearing  HENT:      Head: Normocephalic and atraumatic  Nose: Nose normal       Mouth/Throat:      Mouth: Mucous membranes are moist    Eyes:      Extraocular Movements: Extraocular movements intact  Pupils: Pupils are equal, round, and reactive to light  Neck:      Comments: Right incision c/d/i  Cardiovascular:      Rate and Rhythm: Normal rate and regular rhythm  Pulses: Normal pulses  Heart sounds: Normal heart sounds  Pulmonary:      Effort: Pulmonary effort is normal       Breath sounds: Normal breath sounds  Abdominal:      General: Abdomen is flat  Bowel sounds are normal  There is no distension  Palpations: Abdomen is soft  Tenderness: There is no abdominal tenderness  Musculoskeletal:         General: No swelling  Normal range of motion  Cervical back: Normal range of motion and neck supple  Skin:     General: Skin is warm and dry  Neurological:      General: No focal deficit present  Mental Status: She is alert and oriented to person, place, and time               Laboratory and Diagnostics:  Results from last 7 days   Lab Units 22  0404 22  0841   WBC Thousand/uL 7 56 8 82   HEMOGLOBIN g/dL 11 0* 13 8 HEMATOCRIT % 33 1* 42 2   PLATELETS Thousands/uL 168 234     Results from last 7 days   Lab Units 09/29/22  0404 09/22/22  0841   SODIUM mmol/L 140 141   POTASSIUM mmol/L 3 8 4 4   CHLORIDE mmol/L 109* 110*   CO2 mmol/L 22 24   ANION GAP mmol/L 9 7   BUN mg/dL 17 16   CREATININE mg/dL 0 98 0 87   CALCIUM mg/dL 8 1* 9 4   GLUCOSE RANDOM mg/dL 107  --           Results from last 7 days   Lab Units 09/29/22  0404   INR  1 05   PTT seconds 46*              ABG:    VBG:          Micro        EKG:   Imaging: I have personally reviewed pertinent reports  Intake and Output  I/O       09/27 0701  09/28 0700 09/28 0701 09/29 0700    P  O   510    I V  (mL/kg)  2641 3 (42)    IV Piggyback  500    Total Intake(mL/kg)  3651 3 (58)    Urine (mL/kg/hr)  175 (0 1)    Total Output  175    Net  +3476 3          Unmeasured Urine Occurrence  1 x          Height and Weights   Height: 5' 1" (154 9 cm)     Body mass index is 26 2 kg/m²  Weight (last 2 days)     Date/Time Weight    09/28/22 0611 62 9 (138 67)    09/27/22 0904 62 6 (138)            Nutrition       Diet Orders   (From admission, onward)             Start     Ordered    09/28/22 1430  Diet Regular; Regular House  Diet effective now        References:    Nutrtion Support Algorithm Enteral vs  Parenteral   Question Answer Comment   Diet Type Regular    Regular Regular House    RD to adjust diet per protocol?  Yes        09/28/22 1023                  Active Medications  Scheduled Meds:  Current Facility-Administered Medications   Medication Dose Route Frequency Provider Last Rate    acetaminophen  975 mg Oral Q6H [address] Twyla Balloon, DO      calcium carbonate  1 tablet Oral Daily With Breakfast Twyla Balloon, DO      clopidogrel  75 mg Oral Daily Twyla Balloon, DO      heparin (porcine)  5,000 Units Subcutaneous [facility] Ball[place], [place]      labetalol  5 mg Intravenous V92 Min PRN Twyla Balloon, DO      And    hydrALAZINE  15 mg Intravenous Q15 Min PRN Juancarlos Fear, DO      And    niCARdipine  1-15 mg/hr Intravenous Continuous PRN Juancarlos Fear, DO      phenylephine   mcg/min Intravenous Continuous PRN Juancarlos Fear, DO      pravastatin  40 mg Oral Daily With [address], DO      sodium chloride  125 mL/hr Intravenous Continuous Alexandria Downer,  mL/hr (09/28/22 1021)     Continuous Infusions:  niCARdipine, 1-15 mg/hr  phenylephine,  mcg/min  sodium chloride, 125 mL/hr, Last Rate: 125 mL/hr (09/28/22 1021)      PRN Meds:   labetalol, 5 mg, Q15 Min PRN   And  hydrALAZINE, 15 mg, Q15 Min PRN   And  niCARdipine, 1-15 mg/hr, Continuous PRN  phenylephine,  mcg/min, Continuous PRN        Invasive Devices Review  Invasive Devices  Report    Peripheral Intravenous Line  Duration           Peripheral IV 09/28/22 Left;Ventral (anterior) Forearm <1 day                Rationale for remaining devices:   ---------------------------------------------------------------------------------------  Advance Directive and Living Will:      Power of :    POLST:    ---------------------------------------------------------------------------------------  Care Time Delivered:   No Critical Care time spent       GABBY Sequeira      Portions of the record may have been created with voice recognition software  Occasional wrong word or "sound a like" substitutions may have occurred due to the inherent limitations of voice recognition software    Read the chart carefully and recognize, using context, where substitutions have occurred

## 2022-09-30 ENCOUNTER — TELEPHONE (OUTPATIENT)
Dept: VASCULAR SURGERY | Facility: CLINIC | Age: 82
End: 2022-09-30

## 2022-09-30 NOTE — UTILIZATION REVIEW
Notification of Discharge   This is a Notification of Discharge from our facility [address]  Please be advised that this patient has been discharge from our facility  Below you will find the admission and discharge date and time including the patients disposition  UTILIZATION REVIEW CONTACT:  MIGUEL Belcher  Utilization   Network Utilization Review Department  Phone: [phone] x carefully listen to the prompts  All voicemails are confidential   Email: [email or web address]     PHYSICIAN ADVISORY SERVICES:  FOR [id number] REVIEW - MEDICAL NECESSITY DENIAL  Phone: [phone]  Fax: [phone]  Email: [email or web address]     PRESENTATION DATE: 9/28/2022  5:48 AM  OBERVATION ADMISSION DATE:   INPATIENT ADMISSION DATE: 9/28/22  7:45 AM   DISCHARGE DATE: 9/29/2022  8:50 AM  DISPOSITION: Home/Self Care Home/Self Care      IMPORTANT INFORMATION:  Send all requests for admission clinical reviews, approved or denied determinations and any other requests to dedicated fax number below belonging to the campus where the patient is receiving treatment   List of dedicated fax numbers:  [address] DENIALS (Administrative/Medical Necessity) [phone]   [address] (Maternity/NICU/Pediatrics) [phone]   [facility] [phone]   [address] [facility] [phone]   [address] [place] [address] [phone]   [address] [place] [address],[address] [phone]   [facility]  [phone]   [address] [place] [address]  [facility] [address] [facility] [phone]

## 2022-09-30 NOTE — TELEPHONE ENCOUNTER
Vascular Nurse Navigator Post Op Call    Procedure: ENDARTERECTOMY ARTERY CAROTID w/bovine patch completion duplex imaging (Right)    Date of Procedure: 9/28/22    Surgeon:    Cristina Portillo MD - Primary     * Juve Oconnell DO - Fellow     * Vicente Montano DO - Co-surgeon    Discharge Date: 9/29/22    Discharge Disposition:  Home    Change in Vision?: No    Change in Speech?: No    Weakness?: No    Uncontrolled Pain?: No    Hoarseness?: No    Trouble Swallowing?: No    Incision Concerns?: No    Anticoagulation pt was discharged on post op?: Aspirin and Clopidogrel (Plavix)    Statin pt was discharged on post op?: Zocor (simvastatin)    Bleeding?: No    Fever/chills?: No      Reviewed discharge instructions and incision care with patient  NEXT OFFICE VISIT SCHEDULED:  10/6/22 at 11:30 am with Rc Rose PA-C at [facility] Available?: Yes      Any further questions/concerns? Patient stated that she is doing well since discharge  Reviewed incision care with her - wash daily with soap and water  Reviewed discharge medications - Aspirin, Plavix, and Zocor  Reviewed activity restrictions with her  All questions answered  No concerns expressed at this time

## 2022-10-05 NOTE — PROGRESS NOTES
Assessment/Plan:    Carotid stenosis, bilateral  Patient is a 81yo female, current smoker, with PMHx significant for COPD, CKD, OA, HLD, PAD with intermittent claudication, bilateral carotid artery stenosis s/p R CEA 9/28/22 by Dr Alexander Haas  She presents today for post operative visit  -Doing well post-operatively  Denies fever, chills  Denies incisional pain, swelling, drainage  Denies dysphagia    -Neurovascularly intact  Smile symmetric, tongue midline, 5/5 UE/LE strength  -R CEA incision is c/d/i with 9 sutures in place  All sutures were removed today in office  Incision was cleansed with NSS after procedure   -Incisional care reviewed with patient    -Obtain CV duplex in 3 months per protocol  -F/u after CV duplex with Dr Alexander Haas to review   -Tylenol prn for pain   -Continue ASA 81mg, plavix, statin  Patient states she stopped taking plavix after she was discharged due to bruising on her arm  I have advised her to restart her plavix as it is important to continue DAPT for at least 3 months post procedure  Patient verbalized understanding   -Discussed the importance of smoking cessation and the correlation between smoking and arterial disease   -Discussed signs and symptoms of worsening condition and when to notify the office or go to the ED for evaluation   -Educated on s/sx of worsening condition, including TIA/Stroke-like symptoms, and to call 911 or go to the ED if symptoms occur  Hyperlipidemia  -Stable   -Encourage low cholesterol, low fat diet  -Continue with statin therapy  -Medical Management per PCP          Diagnoses and all orders for this visit:    Carotid stenosis, bilateral  -     VAS carotid complete study; Future    Pure hypercholesterolemia          Subjective:      Patient ID: Phuong Mayer is a 80 y o  female  HPI  Patient presents today post op R CEA done on 9/28/22 by Dr Alexander Haas  Patient denies any pain,, hoarseness or trouble swallowing  No pain fever or chills   She has not required any pain medication  She denies headaches or incisional pain  Her only complaint is that the stitches are being bothersome  Patient is currently taking ASA, Simvastatin  She reports she stopped taking plavix after she was discharged due to arm bruising  I have advised her to restart plavix and continue until her next visit in 3 months  She was instructed to contact the office with questions, concerns, or new symptoms  The following portions of the patient's history were reviewed and updated as appropriate: allergies, current medications, past family history, past medical history, past social history, past surgical history and problem list     Review of Systems   Constitutional: Negative  HENT: Negative  Eyes: Negative  Respiratory: Negative  Cardiovascular: Negative  Gastrointestinal: Negative  Endocrine: Negative  Genitourinary: Negative  Musculoskeletal: Negative  Skin: Negative  Allergic/Immunologic: Negative  Neurological: Negative  Hematological: Negative  Psychiatric/Behavioral: Negative  I have personally reviewed and made appropriate changes to the ROS that was input by the medical assistant         Objective:      Vitals:    10/06/22 1203   BP: 142/80   BP Location: Left arm   Patient Position: Sitting   Cuff Size: Adult   Pulse: 81   Weight: 59 9 kg (132 lb)   Height: 5' 1" (1 549 m)       Patient Active Problem List   Diagnosis    Smoker    Peripheral arterial occlusive disease (HCC)    Proximal humerus fracture    Atherosclerosis of native arteries of extremities with intermittent claudication, unspecified extremity (HCC)    COPD (chronic obstructive pulmonary disease) (HCC)    Degeneration of intervertebral disc    Hyperlipidemia    Iliac artery stenosis, bilateral (HCC)    Carotid stenosis, bilateral    Osteoarthritis    Osteopenia    Varicose veins of right lower extremity with pain    Vitamin D deficiency    Age-related nuclear cataract of both eyes    Open angle with borderline findings, low risk, bilateral    Disorder of arteries and arterioles, unspecified (HCC)    Stricture of artery (HCC)    CKD (chronic kidney disease) stage 3, GFR 30-59 ml/min (HCC)    Fall    Scalp laceration    Embolism and thrombosis of arteries of the lower extremities (HCC)    Preoperative cardiovascular examination       Past Surgical History:   Procedure Laterality Date    APPENDECTOMY      BREAST SURGERY      gilberto    COLONOSCOPY      HEMORRHOID SURGERY      IR AORTAGRAM WITH RUN-OFF  8/26/2019    AR RECONSTR TOTAL SHOULDER IMPLANT Right 12/8/2016    Procedure: ARTHROPLASTY SHOULDER REVERSE;  Surgeon: Ashia Beyer MD;  Location: [facility];  Service: Orthopedics    AR THROMBOENDARTECTMY Domenic Pellet INCIS Right 9/28/2022    Procedure: ENDARTERECTOMY ARTERY CAROTID w/bovine patch completion duplex imaging;  Surgeon: Lina Covington MD;  Location: [facility];  Service: Vascular    THROMBOENDARTERECTOMY      TONSILLECTOMY         Family History   Problem Relation Age of Onset    No Known Problems Mother     No Known Problems Father        Social History     Socioeconomic History    Marital status:      Spouse name: Not on file    Number of children: Not on file    Years of education: Not on file    Highest education level: Not on file   Occupational History    Not on file   Tobacco Use    Smoking status: Current Every Day Smoker     Packs/day: 0 50     Years: 64 00     Pack years: 32 00     Types: Cigarettes    Smokeless tobacco: Never Used    Tobacco comment: smoked cigarette at 0400 9 28 22   Vaping Use    Vaping Use: Never used   Substance and Sexual Activity    Alcohol use: Not Currently     Alcohol/week: 2 0 standard drinks     Types: 2 Cans of beer per week     Comment: glass of beer twice a week    Drug use: No    Sexual activity: Not Currently   Other Topics Concern    Not on file   Social History Narrative    Not on file     Social Determinants of Health     Financial Resource Strain: Not on file   Food Insecurity: Not on file   Transportation Needs: Not on file   Physical Activity: Not on file   Stress: Not on file   Social Connections: Not on file   Intimate Partner Violence: Not on file   Housing Stability: Not on file       Allergies   Allergen Reactions    Celecoxib GI Intolerance    Other Rash     Adhesive tape    Chocolate - Food Allergy     Cilostazol Edema     rash    Sutures  [Suture]     Wound Dressing Adhesive      Other reaction(s): Itching         Current Outpatient Medications:     aspirin 81 MG tablet, Take 81 mg by mouth daily Stopped taking 9/19/22 for sx, Disp: , Rfl:     Calcium Citrate-Vitamin D (CALCIUM + D PO), Take 2 tablets by mouth daily at bedtime, Disp: , Rfl:     clopidogrel (PLAVIX) 75 mg tablet, Take 1 tablet (75 mg total) by mouth daily (Patient taking differently: Take 75 mg by mouth daily Stopped taking 9/19/22), Disp: 90 tablet, Rfl: 0    HYDROcodone-acetaminophen (Norco) 5-325 mg per tablet, Take 1 tablet by mouth every 6 (six) hours as needed for pain for up to 10 days Max Daily Amount: 4 tablets, Disp: 10 tablet, Rfl: 0    simvastatin (ZOCOR) 40 mg tablet, Take 1 tablet (40 mg total) by mouth daily at bedtime, Disp: 90 tablet, Rfl: 1      /80 (BP Location: Left arm, Patient Position: Sitting, Cuff Size: Adult)   Pulse 81   Ht 5' 1" (1 549 m)   Wt 59 9 kg (132 lb)   BMI 24 94 kg/m²          Physical Exam  Vitals and nursing note reviewed  Constitutional:       Appearance: Normal appearance  HENT:      Head: Normocephalic and atraumatic  Neck:      Vascular: No carotid bruit  Comments: R CEA incision is clean, dry, intact with sutures in place  No erythema, swelling, drainage appreciated  No hematoma or pulsatile mass  Cardiovascular:      Rate and Rhythm: Normal rate and regular rhythm        Pulses:           Carotid pulses are 2+ on the right side and 2+ on the left side  Radial pulses are 2+ on the right side and 2+ on the left side  Posterior tibial pulses are 2+ on the right side and 2+ on the left side  Heart sounds: Normal heart sounds  Comments: No carotid bruit   Pulmonary:      Effort: Pulmonary effort is normal  No respiratory distress  Breath sounds: Normal breath sounds  Abdominal:      General: Bowel sounds are normal  There is no distension  Palpations: Abdomen is soft  Comments: No abdominal bruit or pulsatile masses  Musculoskeletal:         General: No swelling or tenderness  Normal range of motion  Cervical back: Normal range of motion and neck supple  Skin:     General: Skin is warm  Capillary Refill: Capillary refill takes less than 2 seconds  Findings: No erythema  Neurological:      General: No focal deficit present  Mental Status: She is alert and oriented to person, place, and time  Comments: Tongue midline, smile symmetric  5/5 UE/LE strength     Psychiatric:         Mood and Affect: Mood normal          Behavior: Behavior normal        Atilio Almodovar PA-C  [facility]  [phone]

## 2022-10-06 ENCOUNTER — OFFICE VISIT (OUTPATIENT)
Dept: VASCULAR SURGERY | Facility: CLINIC | Age: 82
End: 2022-10-06

## 2022-10-06 VITALS
WEIGHT: 132 LBS | HEART RATE: 81 BPM | BODY MASS INDEX: 24.92 KG/M2 | HEIGHT: 61 IN | DIASTOLIC BLOOD PRESSURE: 80 MMHG | SYSTOLIC BLOOD PRESSURE: 142 MMHG

## 2022-10-06 DIAGNOSIS — E78.00 PURE HYPERCHOLESTEROLEMIA: ICD-10-CM

## 2022-10-06 DIAGNOSIS — I65.23 CAROTID STENOSIS, BILATERAL: Primary | ICD-10-CM

## 2022-10-06 PROCEDURE — 99024 POSTOP FOLLOW-UP VISIT: CPT

## 2022-10-06 NOTE — LETTER
October 6, 2022     Zulema Morataya MD  [address]  [place]  [address] [facility] [address]  [address] [place] [address] [address] [address]    Patient: Matilda Serrano   YOB: 1940   Date of Visit: 10/6/2022       Dear Dr María Shirley: Thank you for referring Wendi Rose to me for evaluation  Below are my notes for this consultation  If you have questions, please do not hesitate to call me  I look forward to following your patient along with you  Sincerely,        Olga Palm PA-C        CC: No Recipients  Olga Palm PA-C  10/6/2022 12:43 PM  Incomplete  Assessment/Plan:    Carotid stenosis, bilateral  Patient is a 81yo female, current smoker, with PMHx significant for COPD, CKD, OA, HLD, PAD with intermittent claudication, bilateral carotid artery stenosis s/p R CEA 9/28/22 by Dr María Shirley  She presents today for post operative visit  -Doing well post-operatively  Denies fever, chills  Denies incisional pain, swelling, drainage  Denies dysphagia    -Neurovascularly intact  Smile symmetric, tongue midline, 5/5 UE/LE strength  -R CEA incision is c/d/i with 9 sutures in place  All sutures were removed today in office  Incision was cleansed with NSS after procedure   -Incisional care reviewed with patient    -Obtain CV duplex in 3 months per protocol  -F/u after CV duplex with Dr María Shirley to review   -Tylenol prn for pain   -Continue ASA 81mg, plavix, statin  Patient states she stopped taking plavix after she was discharged due to bruising on her arm  I have advised her to restart her plavix as it is important to continue DAPT for at least 3 months post procedure   Patient verbalized understanding   -Discussed the importance of smoking cessation and the correlation between smoking and arterial disease   -Discussed signs and symptoms of worsening condition and when to notify the office or go to the ED for evaluation   -Educated on s/sx of worsening condition, including TIA/Stroke-like symptoms, and to call 911 or go to the ED if symptoms occur  CC Dr Casa Vazquez    Hyperlipidemia  -Stable   -Encourage low cholesterol, low fat diet  -Continue with statin therapy  -Medical Management per PCP          Diagnoses and all orders for this visit:    Carotid stenosis, bilateral  -     VAS carotid complete study; Future    Pure hypercholesterolemia          Subjective:      Patient ID: Eva Koenig is a 80 y o  female  HPI  Patient presents today post op R CEA done on 9/28/22 by Dr Casa Vazquez  Patient denies any pain,, hoarseness or trouble swallowing  No pain fever or chills  She has not required any pain medication  She denies headaches or incisional pain  Her only complaint is that the stitches are being bothersome  Patient is currently taking ASA, Simvastatin  She reports she stopped taking plavix after she was discharged due to arm bruising  I have advised her to restart plavix and continue until her next visit in 3 months  She was instructed to contact the office with questions, concerns, or new symptoms  The following portions of the patient's history were reviewed and updated as appropriate: allergies, current medications, past family history, past medical history, past social history, past surgical history and problem list     Review of Systems   Constitutional: Negative  HENT: Negative  Eyes: Negative  Respiratory: Negative  Cardiovascular: Negative  Gastrointestinal: Negative  Endocrine: Negative  Genitourinary: Negative  Musculoskeletal: Negative  Skin: Negative  Allergic/Immunologic: Negative  Neurological: Negative  Hematological: Negative  Psychiatric/Behavioral: Negative  I have personally reviewed and made appropriate changes to the ROS that was input by the medical assistant         Objective:      Vitals:    10/06/22 1203   BP: 142/80   BP Location: Left arm   Patient Position: Sitting   Cuff Size: Adult   Pulse: 81   Weight: 59 9 kg (132 lb)   Height: 5' 1" (1 549 m) Patient Active Problem List   Diagnosis    Smoker    Peripheral arterial occlusive disease [facility]    Proximal humerus fracture    Atherosclerosis of native arteries of extremities with intermittent claudication, unspecified extremity ([facility] )    COPD (chronic obstructive pulmonary disease) ([facility])    Degeneration of intervertebral disc    Hyperlipidemia    Iliac artery stenosis, bilateral (HCC)    Carotid stenosis, bilateral    Osteoarthritis    Osteopenia    Varicose veins of right lower extremity with pain    Vitamin D deficiency    Age-related nuclear cataract of both eyes    Open angle with borderline findings, low risk, bilateral    Disorder of arteries and arterioles, unspecified ([facility])    Stricture of artery ([facility])    CKD (chronic kidney disease) stage 3, GFR 30-59 ml/min ([facility])    Fall    Scalp laceration    Embolism and thrombosis of arteries of the lower extremities ([facility] )    Preoperative cardiovascular examination       Past Surgical History:   Procedure Laterality Date    APPENDECTOMY      BREAST SURGERY      gilberto    COLONOSCOPY      HEMORRHOID SURGERY      IR AORTAGRAM WITH RUN-OFF  8/26/2019    TN RECONSTR TOTAL SHOULDER IMPLANT Right 12/8/2016    Procedure: ARTHROPLASTY SHOULDER REVERSE;  Surgeon: Maxx Hunt MD;  Location: [facility];  Service: Orthopedics    TN THROMBOENDARTECTMY Kalee Palafox Right 9/28/2022    Procedure: ENDARTERECTOMY ARTERY CAROTID w/bovine patch completion duplex imaging;  Surgeon: Gerhardt Hoyles, MD;  Location: [facility];  Service: Vascular    THROMBOENDARTERECTOMY      TONSILLECTOMY         Family History   Problem Relation Age of Onset    No Known Problems Mother     No Known Problems Father        Social History     Socioeconomic History    Marital status:      Spouse name: Not on file    Number of children: Not on file    Years of education: Not on file    Highest education level: Not on file   Occupational History    Not on file   Tobacco Use    Smoking status: Current Every Day Smoker     Packs/day: 0 50     Years: 64 00     Pack years: 32 00     Types: Cigarettes    Smokeless tobacco: Never Used    Tobacco comment: smoked cigarette at [phone]   Vaping Use    Vaping Use: Never used   Substance and Sexual Activity    Alcohol use: Not Currently     Alcohol/week: 2 0 standard drinks     Types: 2 Cans of beer per week     Comment: glass of beer twice a week    Drug use: No    Sexual activity: Not Currently   Other Topics Concern    Not on file   Social History Narrative    Not on file     Social Determinants of Health     Financial Resource Strain: Not on file   Food Insecurity: Not on file   Transportation Needs: Not on file   Physical Activity: Not on file   Stress: Not on file   Social Connections: Not on file   Intimate Partner Violence: Not on file   Housing Stability: Not on file       Allergies   Allergen Reactions    Celecoxib GI Intolerance    Other Rash     Adhesive tape    Chocolate - Food Allergy     Cilostazol Edema     rash    Sutures  [Suture]     Wound Dressing Adhesive      Other reaction(s): Itching         Current Outpatient Medications:     aspirin 81 MG tablet, Take 81 mg by mouth daily Stopped taking 9/19/22 for sx, Disp: , Rfl:     Calcium Citrate-Vitamin D (CALCIUM + D PO), Take 2 tablets by mouth daily at bedtime, Disp: , Rfl:     clopidogrel (PLAVIX) 75 mg tablet, Take 1 tablet (75 mg total) by mouth daily (Patient taking differently: Take 75 mg by mouth daily Stopped taking 9/19/22), Disp: 90 tablet, Rfl: 0    HYDROcodone-acetaminophen (Norco) 5-325 mg per tablet, Take 1 tablet by mouth every 6 (six) hours as needed for pain for up to 10 days Max Daily Amount: 4 tablets, Disp: 10 tablet, Rfl: 0    simvastatin (ZOCOR) 40 mg tablet, Take 1 tablet (40 mg total) by mouth daily at bedtime, Disp: 90 tablet, Rfl: 1      /80 (BP Location: Left arm, Patient Position: Sitting, Cuff Size: Adult)   Pulse 81   Ht 5' 1" (1 549 m)   Wt 59 9 kg (132 lb)   BMI 24 94 kg/m²          Physical Exam  Vitals and nursing note reviewed  Constitutional:       Appearance: Normal appearance  HENT:      Head: Normocephalic and atraumatic  Neck:      Vascular: No carotid bruit  Comments: R CEA incision is clean, dry, intact with sutures in place  No erythema, swelling, drainage appreciated  No hematoma or pulsatile mass  Cardiovascular:      Rate and Rhythm: Normal rate and regular rhythm  Pulses:           Carotid pulses are 2+ on the right side and 2+ on the left side  Radial pulses are 2+ on the right side and 2+ on the left side  Posterior tibial pulses are 2+ on the right side and 2+ on the left side  Heart sounds: Normal heart sounds  Comments: No carotid bruit   Pulmonary:      Effort: Pulmonary effort is normal  No respiratory distress  Breath sounds: Normal breath sounds  Abdominal:      General: Bowel sounds are normal  There is no distension  Palpations: Abdomen is soft  Comments: No abdominal bruit or pulsatile masses  Musculoskeletal:         General: No swelling or tenderness  Normal range of motion  Cervical back: Normal range of motion and neck supple  Skin:     General: Skin is warm  Capillary Refill: Capillary refill takes less than 2 seconds  Findings: No erythema  Neurological:      General: No focal deficit present  Mental Status: She is alert and oriented to person, place, and time  Comments: Tongue midline, smile symmetric  5/5 UE/LE strength     Psychiatric:         Mood and Affect: Mood normal          Behavior: Behavior normal        Tommy Villalobos PA-C  [facility]  [phone]

## 2022-10-06 NOTE — ASSESSMENT & PLAN NOTE
-Stable   -Encourage low cholesterol, low fat diet  -Continue with statin therapy  -Medical Management per PCP [facility] MEDICINE PROGRESS NOTE   Patient: Harmeet Jacobs  Today's Date: 2022    YOB: 1965  Admission Date: 2022    MRN: [id number]  Inpatient LOS: 0    Room: 312/  Hospital Day: Hospital Day: 2    Subjective   HISTORY AND SUBJECTIVE COMPLAINTS     Chief Complaint:   Fever, vomiting    Interval History / Subjective:   Patient states she is feeling tired this morning. She otherwise offers no complaints. Denies fever, chills, chest pain, shortness of breath, abdominal pain, nausea, dysuria, hematuria, lightheadedness.    Hospital Course:  Harmeet Jacobs is a 56 year old female who presented on 2022 with complaints of Fever 9 Weeks to 74 years and Vomiting      Patient admitted for sepsis secondary to UTI and left pyelonephritis and acute renal failure.    ROS:  Pertinent systems negative except as above.    Objective   PHYSICAL EXAMINATION     Vital 24 Hour Range Most Recent Value   Temperature Temp  Min: 98 °F (36.7 °C)  Max: 100.3 °F (37.9 °C) 100.3 °F (37.9 °C)   Pulse Pulse  Min: 103  Max: 113 (!) 108   Respiratory Resp  Min: 18  Max: 23 18   Blood Pressure BP  Min: 109/93  Max: 148/67 (!) 109/93   Pulse Oximetry SpO2  Min: 88 %  Max: 99 % 94 %   Arterial BP No data recorded     O2 O2 Flow Rate (L/min)  Av L/min  Min: 2 L/min   Min taken time: 22  Max: 2 L/min   Max taken time: 22       Recorded Intake and Output:    Intake/Output Summary (Last 24 hours) at 2022 0832  Last data filed at 2022 0749  Gross per 24 hour   Intake 200 ml   Output --   Net 200 ml      Recorded Last Stool Occurrence:       Vital Most Recent Value First Value   Weight 126.1 kg (278 lb) Weight: 113.4 kg (250 lb)   Height 5' 4\" (162.6 cm) Height: 5' 4\" (162.6 cm)   BMI 47.72 N/A     General: Patient lying in bed. Does not appear to be in acute distress.  HEENT: Normocephalic. Pupils equal. External ears and nose without abnormalities. Oropharynx  moist.  CV: Regular rate and rhythm. Normal S1 and S2. No murmurs, rubs, or gallops.  Resp: Normal respiratory effort. No use of accessory muscles. Clear to ausculation throughout peripheral lung fields.  Abd: Soft, nontender, nondistended. Bowel sounds normoactive. Obese.  Ext: No bilateral lower extremity edema.  Skin: Warm, dry. No appreciable rashes.  Neuro: Alert and oriented x3. No gross neuro deficits. Moves all extremities spontaneously.  Psych: Pleasant. Engages in conversation. Appropriate mood and affect.    TEST RESULTS     Labs: The Laboratory values listed below have been reviewed and pertinent findings discussed in the Assessment and Plan.    Recent Labs   Lab 06/09/22  0608 06/09/22  0022   SODIUM 134* 134*   POTASSIUM 3.4 3.4   CHLORIDE 99 98   CO2 26 27   BUN 20 20   CREATININE 1.16* 1.11*   GLUCOSE 116* 123*   CALCIUM 8.7 9.1   ANIONGAP 12 12   MG 1.9  --    ALBUMIN  --  3.0*   AST  --  11   GPT  --  18   BILIRUBIN  --  1.3*     Recent Labs   Lab 06/09/22  0608 06/09/22  0002   WBC 13.0* 16.2*   HGB 8.5* 10.6*   HCT 28.7* 35.9*    420     Recent Labs   Lab 06/09/22  0827   GLUCOSE BEDSIDE 108*     Radiology:   CXR 6/8/2022  Low inspiratory volume. The heart is normal in size. Pulmonary vessels are normally distributed.   No focal pulmonary consolidation, pleural effusion or pneumothorax.  CT abdomen/pelvis without contrast 6/9/2022  1. Interval removal of left-sided percutaneous nephrostomy tube.  2. Stable positioning of left double-J ureteral stent.  3. Multiple left-sided staghorn calculus fragments, unchanged.  4. Stable size of left perinephric subcapsular hematoma measuring 15 mm in thickness.  Bilateral lower extremity venous duplex US 6/9/2022  1. No evidence of deep venous thrombosis of either lower extremity.   CXR 6/9/2022  Tip of the right PICC seen to the level of the SVC right atrium junction. No evidence of pneumothorax. Lungs and cardiac silhouette are stable compared to  prior exam.     ANCILLARY ORDERS     Diet:  Consistent Carb Moderate (45-75 Gm/meal) Diet  Telemetry: On  Consults:    None  Therapy Orders:   No orders of the defined types were placed in this encounter.      Advance Care Planning    ADVANCED DIRECTIVES     Code Status: Full Resuscitation          ASSESSMENT AND PLAN     Sepsis secondary to acute cystitis and possible left pyelonephritis  -recently underwent percutaneous nephrolithotomy with Dr. Lim 5/18, nephrostomy tube removed 5/19  -has left ureteral stent in place  -patient presented with nausea/vomiting and fever for two days  -SIRS criteria on presentation: , RR 23, WBC 16.2  -lactic acid 1.9  -UA with positive nitrite, large leukocyte esterase, >100 WBC, large bacteria  -urine culture in process  -blood cultures with no growth to date  -continue cefepime for now pending culture results    Leukocytosis  -WBC 16.2>13.0  -in setting of above  -antibiotics as noted above  -continue to monitor    Acute renal failure  -Cr 1.16; baseline appears to be ~0.6-0.9  -IVFs  -hold losartan  -avoid nephrotoxic agents and renally dose medications as appropriate  -continue to monitor    Mildly elevated D dimer  -D dimer 0.96  -suspect secondary to acute infection  -bilateral lower extremity venous duplex US 6/9 negative for DVT  -no hypoxia  -low suspicion for PE; attempted to perform CT chest PE in the ED, but lost IV access    Type 2 diabetes  -A1c 5.2  -hold glipizide  -sliding scale insulin  -monitor glucose and adjust regimen as indicated    NELIDA  -continue CPAP while sleeping    Morbid obesity  -BMI 47.7  -this increases risk of morbidity and mortality    Essential HTN  -BP acceptable  -hold losartan in setting of acute renal failure  -continue to monitor    Deconditioning  -in setting of above  -PT/OT to evaluate and treat  -discharge planning    Mild hyponatremia  -Na 134  -monitor    Chronic anemia  -Hgb 8.5; baseline appears to be ~10-11  -no overt  bleeding  -continue to monitor    Smoking status: non smoker    Nutrition status: appropriate  Body mass index is 47.72 kg/m². - Morbid obesity (BMI >40 or 35+ and a comorbid condition)  DVT Prophylaxis: Lovenox prophylactic dosing (dose adjusted as per renal function)         DISCHARGE PLANNING     The patient's treatment plans were discussed with patient, RN and .     Recommendations for Discharge   SW     PT     OT     SLP        Anticipated discharge destination: Home  Expected Discharge Date: TBD  Barriers to Discharge: Patient is not medically ready and needs to remain in the hospital today due to culture results        Ya Magdaleno PA-C  Hospitalist  6/9/2022  Care in collaboration with Dr. Uriarte.

## 2022-10-06 NOTE — ASSESSMENT & PLAN NOTE
Patient is a 81yo female, current smoker, with PMHx significant for COPD, CKD, OA, HLD, PAD with intermittent claudication, bilateral carotid artery stenosis s/p R CEA 9/28/22 by Dr Susan Guzman  She presents today for post operative visit  -Doing well post-operatively  Denies fever, chills  Denies incisional pain, swelling, drainage  Denies dysphagia    -Neurovascularly intact  Smile symmetric, tongue midline, 5/5 UE/LE strength  -R CEA incision is c/d/i with 9 sutures in place  All sutures were removed today in office  Incision was cleansed with NSS after procedure   -Incisional care reviewed with patient    -Obtain CV duplex in 3 months per protocol  -F/u after CV duplex with Dr Susan Guzman to review   -Tylenol prn for pain   -Continue ASA 81mg, plavix, statin  Patient states she stopped taking plavix after she was discharged due to bruising on her arm  I have advised her to restart her plavix as it is important to continue DAPT for at least 3 months post procedure  Patient verbalized understanding   -Discussed the importance of smoking cessation and the correlation between smoking and arterial disease   -Discussed signs and symptoms of worsening condition and when to notify the office or go to the ED for evaluation   -Educated on s/sx of worsening condition, including TIA/Stroke-like symptoms, and to call 911 or go to the ED if symptoms occur

## 2022-10-11 ENCOUNTER — OFFICE VISIT (OUTPATIENT)
Dept: FAMILY MEDICINE CLINIC | Facility: CLINIC | Age: 82
End: 2022-10-11
Payer: COMMERCIAL

## 2022-10-11 VITALS
OXYGEN SATURATION: 95 % | RESPIRATION RATE: 16 BRPM | SYSTOLIC BLOOD PRESSURE: 136 MMHG | WEIGHT: 133.2 LBS | TEMPERATURE: 98 F | BODY MASS INDEX: 25.17 KG/M2 | DIASTOLIC BLOOD PRESSURE: 86 MMHG | HEART RATE: 72 BPM

## 2022-10-11 DIAGNOSIS — Z23 FLU VACCINE NEED: ICD-10-CM

## 2022-10-11 DIAGNOSIS — J44.9 CHRONIC OBSTRUCTIVE PULMONARY DISEASE, UNSPECIFIED COPD TYPE (HCC): ICD-10-CM

## 2022-10-11 DIAGNOSIS — I70.211 ATHEROSCLEROSIS OF NATIVE ARTERY OF RIGHT LOWER EXTREMITY WITH INTERMITTENT CLAUDICATION (HCC): ICD-10-CM

## 2022-10-11 DIAGNOSIS — F17.200 SMOKER: ICD-10-CM

## 2022-10-11 DIAGNOSIS — I65.23 CAROTID STENOSIS, BILATERAL: Primary | ICD-10-CM

## 2022-10-11 DIAGNOSIS — E78.00 PURE HYPERCHOLESTEROLEMIA: ICD-10-CM

## 2022-10-11 PROCEDURE — 99495 TRANSJ CARE MGMT MOD F2F 14D: CPT | Performed by: FAMILY MEDICINE

## 2022-10-11 PROCEDURE — 90662 IIV NO PRSV INCREASED AG IM: CPT

## 2022-10-11 PROCEDURE — G0008 ADMIN INFLUENZA VIRUS VAC: HCPCS

## 2022-10-11 NOTE — PROGRESS NOTES
Name: Maria Eugenia Benavides      : 1940      MRN: [id number]  Encounter Provider: Yamileth Saldaña DO  Encounter Date: 10/11/2022   Encounter department: [address] [place] [address]   Patient received high-dose flu vaccine today  Patient to continue present treatment  Patient instructed to follow a low-fat and a low-salt diet and get regular exercise walking as tolerated  Recommend patient discontinue smoking completely  Return to the office next month as scheduled or call sooner p r n  1  Carotid stenosis, bilateral    2  Chronic obstructive pulmonary disease, unspecified COPD type ([facility] )    3  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 ([facility] )    4  Pure hypercholesterolemia    5  Smoker    6  Flu vaccine need  -     influenza vaccine, high-dose, PF 0 7 mL (FLUZONE HIGH-DOSE)      TCM Call     Date and time call was made  2022 11:49 AM    Patient was hospitialized at  [address] [address]    Date of Admission  22    Date of discharge  22    Diagnosis  Carotid stenosis, bilateral    Disposition  Home    Were the patients medications reviewed and updated  No    Current Symptoms  Incisional pain    Incisional pain severity  Moderate      TCM Call     Post hospital issues  None    Should patient be enrolled in anticoag monitoring? No    Scheduled for follow up?   Patient refused    Patients specialists  Other (comment)    Other specialists names  RE[facility] Due[facility]as    Other specialists contcat #  [phone]    Did you obtain your prescribed medications  No    Do you need help managing your prescriptions or medications  No    Is transportation to your appointment needed  No    I have advised the patient to call PCP with any new or worsening symptoms  Olga Mckoy MA    Living Arrangements  Spouse or Significiant other    Support System  Spouse    The type of support provided  Emotional    Do you have social support Yes, as much as I need    Are you recieving any outpatient services  No    Are you recieving home care services  No    Have you fallen in the last 12 months  No               Subjective      Patient is being seen in follow-up from recent hospitalization at [place] [address] from 09/28/2022 until 09/29/2022 for right carotid endarterectomy with Dr Arlene Millan, vascular surgeon  Patient was seen by vascular surgery last week on 10/05/2022 and patient is taking Plavix, aspirin and statin  She has significantly decreased her smoking to 1 pack of cigarettes per week  Patient is feeling well overall although admits to occasional lightheadedness with standing up  Review of Systems   Constitutional: Negative for activity change, appetite change, chills, diaphoresis, fatigue, fever and unexpected weight change  Respiratory: Positive for cough and shortness of breath  Negative for chest tightness and wheezing  Cardiovascular: Negative for chest pain, palpitations and leg swelling  Gastrointestinal: Positive for constipation  Negative for abdominal pain, blood in stool, diarrhea, nausea and vomiting  Genitourinary: Positive for urgency  Negative for difficulty urinating, dysuria, frequency and hematuria  Neurological: Positive for light-headedness  Negative for dizziness, syncope, weakness and headaches  Hematological: Negative for adenopathy  Does not bruise/bleed easily  Psychiatric/Behavioral: Negative for decreased concentration, dysphoric mood and sleep disturbance  The patient is not nervous/anxious          Current Outpatient Medications on File Prior to Visit   Medication Sig   • aspirin 81 MG tablet Take 81 mg by mouth daily Stopped taking 9/19/22 for sx   • Calcium Citrate-Vitamin D (CALCIUM + D PO) Take 2 tablets by mouth daily at bedtime   • clopidogrel (PLAVIX) 75 mg tablet Take 1 tablet (75 mg total) by mouth daily (Patient taking differently: Take 75 mg by mouth daily Stopped taking 9/19/22) • simvastatin (ZOCOR) 40 mg tablet Take 1 tablet (40 mg total) by mouth daily at bedtime       Objective     /86 (BP Location: Left arm, Patient Position: Sitting, Cuff Size: Standard)   Pulse 72   Temp 98 °F (36 7 °C) (Temporal)   Resp 16   Wt 60 4 kg (133 lb 3 2 oz)   SpO2 95%   BMI 25 17 kg/m²     Physical Exam  Constitutional:       General: She is not in acute distress  Appearance: Normal appearance  HENT:      Head: Normocephalic  Mouth/Throat:      Mouth: Mucous membranes are moist    Eyes:      General: No scleral icterus  Conjunctiva/sclera: Conjunctivae normal    Cardiovascular:      Rate and Rhythm: Normal rate and regular rhythm  Pulmonary:      Effort: Pulmonary effort is normal       Comments: Decreased breath sounds throughout  Abdominal:      Palpations: Abdomen is soft  Tenderness: There is no abdominal tenderness  Musculoskeletal:      Cervical back: Neck supple  Right lower leg: No edema  Left lower leg: No edema  Lymphadenopathy:      Cervical: No cervical adenopathy  Skin:     General: Skin is warm and dry  Neurological:      General: No focal deficit present  Mental Status: She is alert and oriented to person, place, and time  Psychiatric:         Mood and Affect: Mood normal          Behavior: Behavior normal          Thought Content:  Thought content normal          Judgment: Judgment normal        Veronica Bermudez DO

## 2022-10-12 PROBLEM — S01.01XA SCALP LACERATION: Status: RESOLVED | Noted: 2022-02-08 | Resolved: 2022-10-12

## 2022-10-26 DIAGNOSIS — I70.211 ATHEROSCLEROSIS OF NATIVE ARTERY OF RIGHT LOWER EXTREMITY WITH INTERMITTENT CLAUDICATION (HCC): ICD-10-CM

## 2022-10-26 RX ORDER — CLOPIDOGREL BISULFATE 75 MG/1
75 TABLET ORAL DAILY
Qty: 90 TABLET | Refills: 0 | Status: SHIPPED | OUTPATIENT
Start: 2022-10-26

## 2022-10-26 NOTE — TELEPHONE ENCOUNTER
Pt s/p R CEA   Per OV note from [facility] 10/6, "Continue medical optimization with ASA 81 mg, plavix 75 mg and statin "

## 2022-11-21 ENCOUNTER — TELEPHONE (OUTPATIENT)
Dept: VASCULAR SURGERY | Facility: CLINIC | Age: 82
End: 2022-11-21

## 2022-11-21 NOTE — TELEPHONE ENCOUNTER
This is a reminder; patient is due for 9 MO CV & OV    Please call patient and schedule the following by the dates provided  Surgeon - DILAN // Vadim Reddy (NPI: [id number])  Date of Surgery - 09/28/22  All appointments must be scheduled by, 09/28/23  !!!! Patient must be scheduled for their 9-month office visit before the follow-up window close date !!!!    ANDRÉS/ CEA VQI Protocol  patients must be scheduled for the following    [x] 3-month Doppler - done 12/28/22    [] 9-month Doppler Expected - due on or after 06/30/23    [] 9-month OV after Doppler - due on or after 06/30/23          Scheduling Reminders  1  Insurance requires, 9-month doppler to be scheduled 6-months and 2-days after the 3-month doppler  2  Appointment notes MUST be entered in the following format:  a  VQI SmartPhrase NEEDED - VQI LTFU - (ANDRÉS or CEA) (Date of Surgery) (Surgeon's Initials) - CV Duplex (Date of Doppler)   3  If unable to schedule, a recall MUST be entered  >>Please route this encounter to Holly Vidales, Lynsey Bowling, and Maria De Jesus Escalona  <<

## 2022-11-28 ENCOUNTER — RA CDI HCC (OUTPATIENT)
Dept: OTHER | Facility: HOSPITAL | Age: 82
End: 2022-11-28

## 2022-11-28 NOTE — PROGRESS NOTES
[facility]  coding opportunities       Chart reviewed, no opportunity found: CHART REVIEWED, NO OPPORTUNITY FOUND        Patients Insurance     Medicare Insurance: [employer] [facility] Advantage

## 2022-12-06 ENCOUNTER — OFFICE VISIT (OUTPATIENT)
Dept: FAMILY MEDICINE CLINIC | Facility: CLINIC | Age: 82
End: 2022-12-06

## 2022-12-06 VITALS
RESPIRATION RATE: 16 BRPM | SYSTOLIC BLOOD PRESSURE: 136 MMHG | WEIGHT: 132.6 LBS | OXYGEN SATURATION: 97 % | BODY MASS INDEX: 25.04 KG/M2 | HEIGHT: 61 IN | HEART RATE: 64 BPM | DIASTOLIC BLOOD PRESSURE: 72 MMHG | TEMPERATURE: 97.4 F

## 2022-12-06 DIAGNOSIS — E55.9 VITAMIN D DEFICIENCY: ICD-10-CM

## 2022-12-06 DIAGNOSIS — J44.9 CHRONIC OBSTRUCTIVE PULMONARY DISEASE, UNSPECIFIED COPD TYPE (HCC): Primary | ICD-10-CM

## 2022-12-06 DIAGNOSIS — I77.9 PERIPHERAL ARTERIAL OCCLUSIVE DISEASE (HCC): ICD-10-CM

## 2022-12-06 DIAGNOSIS — M85.80 OSTEOPENIA, UNSPECIFIED LOCATION: ICD-10-CM

## 2022-12-06 DIAGNOSIS — I65.23 CAROTID STENOSIS, BILATERAL: ICD-10-CM

## 2022-12-06 DIAGNOSIS — M15.9 PRIMARY OSTEOARTHRITIS INVOLVING MULTIPLE JOINTS: ICD-10-CM

## 2022-12-06 DIAGNOSIS — E78.00 PURE HYPERCHOLESTEROLEMIA: ICD-10-CM

## 2022-12-06 RX ORDER — MULTIVIT WITH MINERALS/LUTEIN
1000 TABLET ORAL DAILY
COMMUNITY

## 2022-12-06 NOTE — PATIENT INSTRUCTIONS
Medicare Preventive Visit Patient Instructions  Thank you for completing your Welcome to Medicare Visit or Medicare Annual Wellness Visit today  Your next wellness visit will be due in one year (12/7/2023)  The screening/preventive services that you may require over the next 5-10 years are detailed below  Some tests may not apply to you based off risk factors and/or age  Screening tests ordered at today's visit but not completed yet may show as past due  Also, please note that scanned in results may not display below  Preventive Screenings:  Service Recommendations Previous Testing/Comments   Colorectal Cancer Screening  * Colonoscopy    * Fecal Occult Blood Test (FOBT)/Fecal Immunochemical Test (FIT)  * Fecal DNA/Cologuard Test  * Flexible Sigmoidoscopy Age: 39-70 years old   Colonoscopy: every 10 years (may be performed more frequently if at higher risk)  OR  FOBT/FIT: every 1 year  OR  Cologuard: every 3 years  OR  Sigmoidoscopy: every 5 years  Screening may be recommended earlier than age 39 if at higher risk for colorectal cancer  Also, an individualized decision between you and your healthcare provider will decide whether screening between the ages of 74-80 would be appropriate  Colonoscopy: Not on file  FOBT/FIT: Not on file  Cologuard: Not on file  Sigmoidoscopy: Not on file          Breast Cancer Screening Age: 36 years old  Frequency: every 1-2 years  Not required if history of left and right mastectomy Mammogram: 07/08/2014        Cervical Cancer Screening Between the ages of 21-29, pap smear recommended once every 3 years  Between the ages of 33-67, can perform pap smear with HPV co-testing every 5 years     Recommendations may differ for women with a history of total hysterectomy, cervical cancer, or abnormal pap smears in past  Pap Smear: Not on file    Screening Not Indicated   Hepatitis C Screening Once for adults born between [facility]  More frequently in patients at high risk for Hepatitis C Hep C Antibody: Not on file        Diabetes Screening 1-2 times per year if you're at risk for diabetes or have pre-diabetes Fasting glucose: 96 mg/dL (9/22/2022)  A1C: No results in last 5 years (No results in last 5 years)  Screening Current   Cholesterol Screening Once every 5 years if you don't have a lipid disorder  May order more often based on risk factors  Lipid panel: 12/03/2021    Screening Not Indicated  History Lipid Disorder     Other Preventive Screenings Covered by Medicare:  1  Abdominal Aortic Aneurysm (AAA) Screening: covered once if your at risk  You're considered to be at risk if you have a family history of AAA  2  Lung Cancer Screening: covers low dose CT scan once per year if you meet all of the following conditions: (1) Age 50-69; (2) No signs or symptoms of lung cancer; (3) Current smoker or have quit smoking within the last 15 years; (4) You have a tobacco smoking history of at least 20 pack years (packs per day multiplied by number of years you smoked); (5) You get a written order from a healthcare provider  3  Glaucoma Screening: covered annually if you're considered high risk: (1) You have diabetes OR (2) Family history of glaucoma OR (3)  aged 48 and older OR (3)  American aged 72 and older  3  Osteoporosis Screening: covered every 2 years if you meet one of the following conditions: (1) You're estrogen deficient and at risk for osteoporosis based off medical history and other findings; (2) Have a vertebral abnormality; (3) On glucocorticoid therapy for more than 3 months; (4) Have primary hyperparathyroidism; (5) On osteoporosis medications and need to assess response to drug therapy  · Last bone density test (DXA Scan): 04/23/2013  5  HIV Screening: covered annually if you're between the age of 12-76  Also covered annually if you are younger than 13 and older than 72 with risk factors for HIV infection   For pregnant patients, it is covered up to 3 times per pregnancy  Immunizations:  Immunization Recommendations   Influenza Vaccine Annual influenza vaccination during flu season is recommended for all persons aged >= 6 months who do not have contraindications   Pneumococcal Vaccine   * Pneumococcal conjugate vaccine = PCV13 (Prevnar 13), PCV15 (Vaxneuvance), PCV20 (Prevnar 20)  * Pneumococcal polysaccharide vaccine = PPSV23 (Pneumovax) Adults 25-60 years old: 1-3 doses may be recommended based on certain risk factors  Adults 72 years old: 1-2 doses may be recommended based off what pneumonia vaccine you previously received   Hepatitis B Vaccine 3 dose series if at intermediate or high risk (ex: diabetes, end stage renal disease, liver disease)   Tetanus (Td) Vaccine - COST NOT COVERED BY MEDICARE PART B Following completion of primary series, a booster dose should be given every 10 years to maintain immunity against tetanus  Td may also be given as tetanus wound prophylaxis  Tdap Vaccine - COST NOT COVERED BY MEDICARE PART B Recommended at least once for all adults  For pregnant patients, recommended with each pregnancy  Shingles Vaccine (Shingrix) - COST NOT COVERED BY MEDICARE PART B  2 shot series recommended in those aged 48 and above     Health Maintenance Due:      Topic Date Due   • Lung Cancer Screening  Discontinued     Immunizations Due:      Topic Date Due   • Hepatitis A Vaccine (1 of 2 - Risk 2-dose series) Never done   • Hepatitis B Vaccine (1 of 3 - Risk 3-dose series) Never done   • COVID-19 Vaccine (3 - Booster for Moderna series) 09/22/2021     Advance Directives   What are advance directives? Advance directives are legal documents that state your wishes and plans for medical care  These plans are made ahead of time in case you lose your ability to make decisions for yourself  Advance directives can apply to any medical decision, such as the treatments you want, and if you want to donate organs     What are the types of advance directives? There are many types of advance directives, and each state has rules about how to use them  You may choose a combination of any of the following:  · Living will: This is a written record of the treatment you want  You can also choose which treatments you do not want, which to limit, and which to stop at a certain time  This includes surgery, medicine, IV fluid, and tube feedings  · Durable power of  for healthcare [facility]): This is a written record that states who you want to make healthcare choices for you when you are unable to make them for yourself  This person, called a proxy, is usually a family member or a friend  You may choose more than 1 proxy  · Do not resuscitate (DNR) order:  A DNR order is used in case your heart stops beating or you stop breathing  It is a request not to have certain forms of treatment, such as CPR  A DNR order may be included in other types of advance directives  · Medical directive: This covers the care that you want if you are in a coma, near death, or unable to make decisions for yourself  You can list the treatments you want for each condition  Treatment may include pain medicine, surgery, blood transfusions, dialysis, IV or tube feedings, and a ventilator (breathing machine)  · Values history: This document has questions about your views, beliefs, and how you feel and think about life  This information can help others choose the care that you would choose  Why are advance directives important? An advance directive helps you control your care  Although spoken wishes may be used, it is better to have your wishes written down  Spoken wishes can be misunderstood, or not followed  Treatments may be given even if you do not want them  An advance directive may make it easier for your family to make difficult choices about your care     Cigarette Smoking and Your Health   Risks to your health if you smoke:  Nicotine and other chemicals found in tobacco damage every cell in your body  Even if you are a light smoker, you have an increased risk for cancer, heart disease, and lung disease  If you are pregnant or have diabetes, smoking increases your risk for complications  Benefits to your health if you stop smoking:   · You decrease respiratory symptoms such as coughing, wheezing, and shortness of breath  · You reduce your risk for cancers of the lung, mouth, throat, kidney, bladder, pancreas, stomach, and cervix  If you already have cancer, you increase the benefits of chemotherapy  You also reduce your risk for cancer returning or a second cancer from developing  · You reduce your risk for heart disease, blood clots, heart attack, and stroke  · You reduce your risk for lung infections, and diseases such as pneumonia, asthma, chronic bronchitis, and emphysema  · Your circulation improves  More oxygen can be delivered to your body  If you have diabetes, you lower your risk for complications, such as kidney, artery, and eye diseases  You also lower your risk for nerve damage  Nerve damage can lead to amputations, poor vision, and blindness  · You improve your body's ability to heal and to fight infections  For more information and support to stop smoking:   · [employer]  Phone: 0- 228 - [phone]  Web Address: www [employer]  Weight Management   Why it is important to manage your weight:  Being overweight increases your risk of health conditions such as heart disease, high blood pressure, type 2 diabetes, and certain types of cancer  It can also increase your risk for osteoarthritis, sleep apnea, and other respiratory problems  Aim for a slow, steady weight loss  Even a small amount of weight loss can lower your risk of health problems  How to lose weight safely:  A safe and healthy way to lose weight is to eat fewer calories and get regular exercise   You can lose up about 1 pound a week by decreasing the number of calories you eat by 500 calories each day  Healthy meal plan for weight management:  A healthy meal plan includes a variety of foods, contains fewer calories, and helps you stay healthy  A healthy meal plan includes the following:  · Eat whole-grain foods more often  A healthy meal plan should contain fiber  Fiber is the part of grains, fruits, and vegetables that is not broken down by your body  Whole-grain foods are healthy and provide extra fiber in your diet  Some examples of whole-grain foods are whole-wheat breads and pastas, oatmeal, brown rice, and bulgur  · Eat a variety of vegetables every day  Include dark, leafy greens such as spinach, kale, malcolm greens, and mustard greens  Eat yellow and orange vegetables such as carrots, sweet potatoes, and winter squash  · Eat a variety of fruits every day  Choose fresh or canned fruit (canned in its own juice or light syrup) instead of juice  Fruit juice has very little or no fiber  · Eat low-fat dairy foods  Drink fat-free (skim) milk or 1% milk  Eat fat-free yogurt and low-fat cottage cheese  Try low-fat cheeses such as mozzarella and other reduced-fat cheeses  · Choose meat and other protein foods that are low in fat  Choose beans or other legumes such as split peas or lentils  Choose fish, skinless poultry (chicken or turkey), or lean cuts of red meat (beef or pork)  Before you cook meat or poultry, cut off any visible fat  · Use less fat and oil  Try baking foods instead of frying them  Add less fat, such as margarine, sour cream, regular salad dressing and mayonnaise to foods  Eat fewer high-fat foods  Some examples of high-fat foods include french fries, doughnuts, ice cream, and cakes  · Eat fewer sweets  Limit foods and drinks that are high in sugar  This includes candy, cookies, regular soda, and sweetened drinks  Exercise:  Exercise at least 30 minutes per day on most days of the week  Some examples of exercise include walking, biking, dancing, and swimming   You can also fit in more physical activity by taking the stairs instead of the elevator or parking farther away from stores  Ask your healthcare provider about the best exercise plan for you  © Copyright [employer] 2018 Information is for End User's use only and may not be sold, redistributed or otherwise used for commercial purposes   All illustrations and images included in CareNotes® are the copyrighted property of A D A M , Inc  or [address] [place] [address]

## 2022-12-06 NOTE — PROGRESS NOTES
Name: Maria Eugenia Benavides      : 1940      MRN: [id number]  Encounter Provider: Yamileth Saldaña DO  Encounter Date: 2022   Encounter department: [address] [place] [address]   Patient given lab requisition for fasting labs as below  Patient given lab requisition for fasting labs as below  To continue present treatment  Patient to continue present treatment  Instructed to follow a low-fat diet and get regular exercise walking as tolerated  Recommend increased water intake to 8  Patient instructed to follow a low-fat diet and get regular exercise walking as tolerated  Recommend increased water, 8 ounce glasses daily  intake to 8, 8 oz glasses daily  Follow-up with specialist as scheduled and return to the office in 6 months  Follow up with specialists as scheduled return to the office in 6 months  1  Chronic obstructive pulmonary disease, unspecified COPD type ([facility] )  -     CBC; Future    2  Carotid stenosis, bilateral    3  Peripheral arterial occlusive disease ([facility] )    4  Primary osteoarthritis involving multiple joints    5  Osteopenia, unspecified location    6  Pure hypercholesterolemia  -     CBC; Future  -     Comprehensive metabolic panel; Future  -     Lipid Panel with Direct LDL reflex; Future  -     TSH, 3rd generation with Free T4 reflex; Future  -     UA w Reflex to Microscopic w Reflex to Culture; Future    7  Vitamin D deficiency  -     Vitamin D 25 hydroxy; Future           Subjective      Patient is here for follow-up appoint for chronic conditions and due for fasting labs  Also annual Medicare wellness exam Patient is here for follow-up appoint for chronic conditions and due for fasting labs  Also annual Medicare wellness exam   Patient's been feeling well overall although admits to  Patient has been feeling occasional lightheadedness well overall although admits to occasional lightheadedness since her carotid artery surgery 2 months ago   since her carotid artery surgery 2 months ago  She denies she denies vertigo or spinning sensation  Vertigo or spinning sensation  Patient admits to not drinking much water daily  Patient admits to not drinking much water daily  Continues to smoke and does not plan to quit  he continues to smoke and does not plan to quit  Patient is scheduled for follow-up carotid is scheduled for follow-up carotid is scheduled ultrasound the end of this month then follow-up appoint with Dr María Shirley at [place] [address] vascular surgery  for follow-up carotid ultrasound the end of this month then follow-up appoint with Dr María Shirley at [facility] vascular surgery  Hyperlipidemia  This is a chronic problem  The problem is controlled  She has no history of diabetes or hypothyroidism  Pertinent negatives include no chest pain, focal sensory loss, focal weakness, leg pain, myalgias or shortness of breath  Current antihyperlipidemic treatment includes statins  The current treatment provides significant improvement of lipids  There are no compliance problems  Risk factors for coronary artery disease include dyslipidemia, family history and post-menopausal      Review of Systems   Respiratory: Negative for shortness of breath  Cardiovascular: Negative for chest pain  Musculoskeletal: Negative for myalgias  Neurological: Negative for focal weakness         Current Outpatient Medications on File Prior to Visit   Medication Sig   • Ascorbic Acid (vitamin C) 1000 MG tablet Take 1,000 mg by mouth daily   • aspirin 81 MG tablet Take 81 mg by mouth daily Stopped taking 9/19/22 for sx   • Calcium Citrate-Vitamin D (CALCIUM + D PO) Take 2 tablets by mouth daily at bedtime   • clopidogrel (PLAVIX) 75 mg tablet Take 1 tablet (75 mg total) by mouth daily   • simvastatin (ZOCOR) 40 mg tablet Take 1 tablet (40 mg total) by mouth daily at bedtime       Objective     /72   Pulse 64   Temp (!) 97 4 °F (36 3 °C) (Tympanic)   Resp 16   Ht 5' 1" (1 549 m)   Wt 60 1 kg (132 lb 9 6 oz)   SpO2 97%   BMI 25 05 kg/m²     Physical Exam  Constitutional:       General: She is not in acute distress  Appearance: Normal appearance  HENT:      Head: Normocephalic  Right Ear: Tympanic membrane normal       Left Ear: Tympanic membrane normal       Nose: Nose normal       Mouth/Throat:      Mouth: Mucous membranes are moist       Pharynx: Oropharynx is clear  Eyes:      General: No scleral icterus  Conjunctiva/sclera: Conjunctivae normal    Neck:      Vascular: No carotid bruit  Cardiovascular:      Rate and Rhythm: Normal rate and regular rhythm  Pulmonary:      Effort: Pulmonary effort is normal       Breath sounds: Normal breath sounds  Abdominal:      Palpations: Abdomen is soft  Tenderness: There is no abdominal tenderness  Musculoskeletal:      Cervical back: Neck supple  Right lower leg: No edema  Left lower leg: No edema  Lymphadenopathy:      Cervical: No cervical adenopathy  Skin:     General: Skin is warm and dry  Neurological:      General: No focal deficit present  Mental Status: She is alert and oriented to person, place, and time  Psychiatric:         Mood and Affect: Mood normal          Behavior: Behavior normal          Thought Content:  Thought content normal          Judgment: Judgment normal        Nusrat Miramontes DO

## 2022-12-06 NOTE — PROGRESS NOTES
Assessment and Plan:     Problem List Items Addressed This Visit    None      Depression Screening and Follow-up Plan: Patient was screened for depression during today's encounter  They screened negative with a PHQ-2 score of 0  Preventive health issues were discussed with patient, and age appropriate screening tests were ordered as noted in patient's After Visit Summary  Personalized health advice and appropriate referrals for health education or preventive services given if needed, as noted in patient's After Visit Summary       History of Present Illness:     Patient presents for a Medicare Wellness Visit    HPI   Patient Care Team:  Darek Kim DO as PCP - General (Family Medicine)  Darek Kim DO as PCP - [facility]CP-[facility]  MD Darek Stein DO Jacki Pew, MD Kenyon Crea, DO     Review of Systems:     Review of Systems     Problem List:     Patient Active Problem List   Diagnosis   • Smoker   • Peripheral arterial occlusive disease [facility])   • Proximal humerus fracture   • Atherosclerosis of native arteries of extremities with intermittent claudication, unspecified extremity ([facility])   • COPD (chronic obstructive pulmonary disease) ([facility] [address] )   • Degeneration of intervertebral disc   • Hyperlipidemia   • Iliac artery stenosis, bilateral ([facility])   • Carotid stenosis, bilateral   • Osteoarthritis   • Osteopenia   • Varicose veins of right lower extremity with pain   • Vitamin D deficiency   • Age-related nuclear cataract of both eyes   • Open angle with borderline findings, low risk, bilateral   • Disorder of arteries and arterioles, unspecified ([facility])   • Stricture of artery ([facility])   • CKD (chronic kidney disease) stage 3, GFR 30-59 ml/min ([facility])   • Fall   • Embolism and thrombosis of arteries of the lower extremities ([facility] [address] )   • Preoperative cardiovascular examination      Past Medical and Surgical History:     Past Medical History: Diagnosis Date   • Colon polyp    • Food allergy     chocholate   • Hyperlipidemia    • Peripheral arterial occlusive disease ([address] )    • Right humeral fracture     right poximal   • Smoking    • Wears dentures     full upper   • Wears glasses      Past Surgical History:   Procedure Laterality Date   • APPENDECTOMY     • BREAST SURGERY      gilberto   • COLONOSCOPY     • HEMORRHOID SURGERY     • IR AORTAGRAM WITH RUN-OFF  8/26/2019   • NC RECONSTR TOTAL SHOULDER IMPLANT Right 12/8/2016    Procedure: ARTHROPLASTY SHOULDER REVERSE;  Surgeon: Marilee Baez MD;  [facility];  Service: Orthopedics   • NC THROMBOENDARTECTMY Dene Records Right 9/28/2022    Procedure: ENDARTERECTOMY ARTERY CAROTID w/bovine patch completion duplex imaging;  Surgeon: Dajuan Arevalo MD;  [facility];  Service: Vascular   • THROMBOENDARTERECTOMY     • TONSILLECTOMY        Family History:     Family History   Problem Relation Age of Onset   • No Known Problems Mother    • No Known Problems Father       Social History:     Social History     Socioeconomic History   • Marital status:      Spouse name: None   • Number of children: None   • Years of education: None   • Highest education level: None   Occupational History   • None   Tobacco Use   • Smoking status: Every Day     Packs/day: 0 50     Years: 64 00     Pack years: 32 00     Types: Cigarettes   • Smokeless tobacco: Never   • Tobacco comments:     smoked cigarette at 0400 9 28 22   Vaping Use   • Vaping Use: Never used   Substance and Sexual Activity   • Alcohol use: Not Currently     Alcohol/week: 2 0 standard drinks     Types: 2 Cans of beer per week     Comment: glass of beer twice a week   • Drug use: No   • Sexual activity: Not Currently   Other Topics Concern   • None   Social History Narrative   • None     Social Determinants of Health     Financial Resource Strain: Not on file   Food Insecurity: Not on file   Transportation Needs: Not on file   Physical Activity: Not on file   Stress: Not on file   Social Connections: Not on file   Intimate Partner Violence: Not on file   Housing Stability: Not on file      Medications and Allergies:     Current Outpatient Medications   Medication Sig Dispense Refill   • aspirin 81 MG tablet Take 81 mg by mouth daily Stopped taking 9/19/22 for sx     • Calcium Citrate-Vitamin D (CALCIUM + D PO) Take 2 tablets by mouth daily at bedtime     • clopidogrel (PLAVIX) 75 mg tablet Take 1 tablet (75 mg total) by mouth daily 90 tablet 0   • simvastatin (ZOCOR) 40 mg tablet Take 1 tablet (40 mg total) by mouth daily at bedtime 90 tablet 1     No current facility-administered medications for this visit  Allergies   Allergen Reactions   • Celecoxib GI Intolerance   • Other Rash     Adhesive tape   • Chocolate - Food Allergy    • Cilostazol Edema     rash   • Sutures  [Suture]    • Wound Dressing Adhesive      Other reaction(s): Itching      Immunizations:     Immunization History   Administered Date(s) Administered   • COVID-19 MODERNA VACC 0 5 ML IM 03/23/2021, 04/22/2021   • INFLUENZA 11/02/2017, 12/13/2018   • Influenza Split High Dose Preservative Free IM 10/11/2012, 11/26/2013, 09/04/2014, 09/24/2015, 09/09/2016, 11/02/2017   • Influenza, high dose seasonal 0 7 mL 12/13/2018, 10/29/2019, 11/19/2020, 11/30/2021, 10/11/2022   • Influenza, seasonal, injectable 10/14/2011   • Pneumococcal Conjugate 13-Valent 12/08/2014   • Pneumococcal Polysaccharide PPV23 08/31/2005, 11/26/2013   • Tdap 01/30/2022      Health Maintenance:         Topic Date Due   • Lung Cancer Screening  Discontinued         Topic Date Due   • Hepatitis A Vaccine (1 of 2 - Risk 2-dose series) Never done   • Hepatitis B Vaccine (1 of 3 - Risk 3-dose series) Never done   • COVID-19 Vaccine (3 - Booster for Zach Cruel series) 09/22/2021      Medicare Screening Tests and Risk Assessments:     Annie Rubio is here for her Subsequent Wellness visit       Health Risk Assessment: Patient rates overall health as very good  Patient feels that their physical health rating is slightly better  Patient is very satisfied with their life  Eyesight was rated as same  Hearing was rated as same  Patient feels that their emotional and mental health rating is same  Patients states they are never, rarely angry  Patient states they are never, rarely unusually tired/fatigued  Pain experienced in the last 7 days has been some  Patient's pain rating has been 2/10  Patient states that she has experienced no weight loss or gain in last 6 months  Depression Screening:   PHQ-2 Score: 0      Fall Risk Screening: In the past year, patient has experienced: no history of falling in past year      Urinary Incontinence Screening:   Patient has not leaked urine accidently in the last six months  Home Safety:  Patient does not have trouble with stairs inside or outside of their home  Patient has working smoke alarms and has working carbon monoxide detector  Home safety hazards include: none  Nutrition:   Current diet is Regular  Medications:   Patient is currently taking over-the-counter supplements  OTC medications include: see medication list  Patient is able to manage medications  Activities of Daily Living (ADLs)/Instrumental Activities of Daily Living (IADLs):   Walk and transfer into and out of bed and chair?: Yes  Dress and groom yourself?: Yes    Bathe or shower yourself?: Yes    Feed yourself? Yes  Do your laundry/housekeeping?: Yes  Manage your money, pay your bills and track your expenses?: Yes  Make your own meals?: Yes    Do your own shopping?: Yes    Previous Hospitalizations:   Any hospitalizations or ED visits within the last 12 months?: Yes    How many hospitalizations have you had in the last year?: 1-2    Hospitalization Comments: Carotid art surg    Advance Care Planning:   Living will: Yes    Durable POA for healthcare:  Yes    Advanced directive: Yes      Cognitive Screening: Provider or family/friend/caregiver concerned regarding cognition?: No    PREVENTIVE SCREENINGS      Cardiovascular Screening:    General: Screening Not Indicated, History Lipid Disorder and Risks and Benefits Discussed    Due for: Lipid Panel      Diabetes Screening:     General: Screening Current and Risks and Benefits Discussed    Due for: Blood Glucose      Colorectal Cancer Screening:     General: Screening Not Indicated      Breast Cancer Screening:     General: Risks and Benefits Discussed and Patient Declines      Cervical Cancer Screening:    General: Screening Not Indicated      Osteoporosis Screening:    General: Risks and Benefits Discussed and Patient Declines      Abdominal Aortic Aneurysm (AAA) Screening:        General: Screening Not Indicated and Risks and Benefits Discussed      Lung Cancer Screening:     General: Screening Not Indicated, Risks and Benefits Discussed and Patient Declines      Hepatitis C Screening:    General: Screening Not Indicated    Screening, Brief Intervention, and Referral to Treatment (SBIRT)    Screening  Typical number of drinks in a day: 0  Typical number of drinks in a week: 0  Interpretation: Low risk drinking behavior  AUDIT-C Screenin) How often did you have a drink containing alcohol in the past year? never  2) How many drinks did you have on a typical day when you were drinking in the past year? 0  3) How often did you have 6 or more drinks on one occasion in the past year? never    AUDIT-C Score: 0  Interpretation: Score 0-2 (female): Negative screen for alcohol misuse    Single Item Drug Screening:  How often have you used an illegal drug (including marijuana) or a prescription medication for non-medical reasons in the past year? never    Single Item Drug Screen Score: 0  Interpretation: Negative screen for possible drug use disorder    Brief Intervention  Alcohol & drug use screenings were reviewed   No concerns regarding substance use disorder identified  Other Counseling Topics:   Car/seat belt/driving safety, skin self-exam, sunscreen and calcium and vitamin D intake and regular weightbearing exercise  No results found       Physical Exam:     Pulse 70   Temp (!) 97 4 °F (36 3 °C) (Tympanic)   Ht 5' 1" (1 549 m)   Wt 60 1 kg (132 lb 9 6 oz)   SpO2 97%   BMI 25 05 kg/m²     Physical Exam     Chiquita Fong, DO

## 2022-12-13 ENCOUNTER — APPOINTMENT (OUTPATIENT)
Dept: LAB | Facility: IMAGING CENTER | Age: 82
End: 2022-12-13

## 2022-12-13 DIAGNOSIS — E55.9 VITAMIN D DEFICIENCY: ICD-10-CM

## 2022-12-13 DIAGNOSIS — J44.9 CHRONIC OBSTRUCTIVE PULMONARY DISEASE, UNSPECIFIED COPD TYPE (HCC): ICD-10-CM

## 2022-12-13 DIAGNOSIS — E78.00 PURE HYPERCHOLESTEROLEMIA: ICD-10-CM

## 2022-12-13 LAB
ALBUMIN SERPL BCP-MCNC: 4 G/DL (ref 3.5–5)
ALP SERPL-CCNC: 74 U/L (ref 46–116)
ALT SERPL W P-5'-P-CCNC: 15 U/L (ref 12–78)
ANION GAP SERPL CALCULATED.3IONS-SCNC: 5 MMOL/L (ref 4–13)
AST SERPL W P-5'-P-CCNC: 12 U/L (ref 5–45)
BILIRUB SERPL-MCNC: 0.6 MG/DL (ref 0.2–1)
BUN SERPL-MCNC: 23 MG/DL (ref 5–25)
CALCIUM SERPL-MCNC: 9.3 MG/DL (ref 8.3–10.1)
CHLORIDE SERPL-SCNC: 111 MMOL/L (ref 96–108)
CHOLEST SERPL-MCNC: 142 MG/DL
CO2 SERPL-SCNC: 26 MMOL/L (ref 21–32)
CREAT SERPL-MCNC: 1.03 MG/DL (ref 0.6–1.3)
ERYTHROCYTE [DISTWIDTH] IN BLOOD BY AUTOMATED COUNT: 13.5 % (ref 11.6–15.1)
GFR SERPL CREATININE-BSD FRML MDRD: 50 ML/MIN/1.73SQ M
GLUCOSE P FAST SERPL-MCNC: 92 MG/DL (ref 65–99)
HCT VFR BLD AUTO: 43.3 % (ref 34.8–46.1)
HDLC SERPL-MCNC: 51 MG/DL
HGB BLD-MCNC: 13.8 G/DL (ref 11.5–15.4)
LDLC SERPL CALC-MCNC: 67 MG/DL (ref 0–100)
MCH RBC QN AUTO: 32.8 PG (ref 26.8–34.3)
MCHC RBC AUTO-ENTMCNC: 31.9 G/DL (ref 31.4–37.4)
MCV RBC AUTO: 103 FL (ref 82–98)
PLATELET # BLD AUTO: 200 THOUSANDS/UL (ref 149–390)
PMV BLD AUTO: 10.6 FL (ref 8.9–12.7)
POTASSIUM SERPL-SCNC: 4.3 MMOL/L (ref 3.5–5.3)
PROT SERPL-MCNC: 7.3 G/DL (ref 6.4–8.4)
RBC # BLD AUTO: 4.21 MILLION/UL (ref 3.81–5.12)
SODIUM SERPL-SCNC: 142 MMOL/L (ref 135–147)
TRIGL SERPL-MCNC: 121 MG/DL
TSH SERPL DL<=0.05 MIU/L-ACNC: 1.22 UIU/ML (ref 0.45–4.5)
WBC # BLD AUTO: 8.03 THOUSAND/UL (ref 4.31–10.16)

## 2022-12-14 ENCOUNTER — APPOINTMENT (OUTPATIENT)
Dept: LAB | Facility: IMAGING CENTER | Age: 82
End: 2022-12-14

## 2022-12-14 LAB
25(OH)D3 SERPL-MCNC: 35.2 NG/ML (ref 30–100)
BILIRUB UR QL STRIP: NEGATIVE
CLARITY UR: CLEAR
COLOR UR: NORMAL
GLUCOSE UR STRIP-MCNC: NEGATIVE MG/DL
HGB UR QL STRIP.AUTO: NEGATIVE
KETONES UR STRIP-MCNC: NEGATIVE MG/DL
LEUKOCYTE ESTERASE UR QL STRIP: NEGATIVE
NITRITE UR QL STRIP: NEGATIVE
PH UR STRIP.AUTO: 5.5 [PH]
PROT UR STRIP-MCNC: NEGATIVE MG/DL
SP GR UR STRIP.AUTO: 1.01 (ref 1–1.03)
UROBILINOGEN UR STRIP-ACNC: <2 MG/DL

## 2022-12-28 ENCOUNTER — HOSPITAL ENCOUNTER (OUTPATIENT)
Dept: NON INVASIVE DIAGNOSTICS | Facility: CLINIC | Age: 82
Discharge: HOME | End: 2022-12-28

## 2022-12-28 DIAGNOSIS — I65.23 CAROTID STENOSIS, BILATERAL: ICD-10-CM

## 2023-01-03 ENCOUNTER — TELEPHONE (OUTPATIENT)
Dept: VASCULAR SURGERY | Facility: CLINIC | Age: 83
End: 2023-01-03

## 2023-01-03 DIAGNOSIS — I65.23 CAROTID STENOSIS, BILATERAL: Primary | ICD-10-CM

## 2023-01-03 NOTE — TELEPHONE ENCOUNTER
Reviewed imaging  No significant changes from previous carotid study  The study continues to show >70% stenosis in the left ICA with a patent right ICA  Recommend repeating duplex in 6 months with f/u for 9 month VQI and results review

## 2023-01-03 NOTE — TELEPHONE ENCOUNTER
vm left by pt ? cv doppler results, test was done 12/28/22  She has not heard from us and would like to know plan  Currently not scheduled for f/u visit  Per consensus results were tasked to Fabienne Homans PA-C for review  Please review and advise  Thank you

## 2023-01-06 DIAGNOSIS — I70.211 ATHEROSCLEROSIS OF NATIVE ARTERY OF RIGHT LOWER EXTREMITY WITH INTERMITTENT CLAUDICATION (HCC): ICD-10-CM

## 2023-01-06 RX ORDER — CLOPIDOGREL BISULFATE 75 MG/1
75 TABLET ORAL DAILY
Qty: 90 TABLET | Refills: 0 | Status: SHIPPED | OUTPATIENT
Start: 2023-01-06

## 2023-01-06 NOTE — TELEPHONE ENCOUNTER
Pt called for refill of clopidogrel  She has OV scheduled with Dr Siddhartha Blanchard on 2/21/23  She is requesting a refill of clopidogrel to last until her OV with provider, so she can discuss if she needs to continue the medication

## 2023-02-18 DIAGNOSIS — E78.5 HYPERLIPIDEMIA, UNSPECIFIED HYPERLIPIDEMIA TYPE: ICD-10-CM

## 2023-02-18 RX ORDER — SIMVASTATIN 40 MG
TABLET ORAL
Qty: 90 TABLET | Refills: 1 | Status: SHIPPED | OUTPATIENT
Start: 2023-02-18

## 2023-02-21 ENCOUNTER — OFFICE VISIT (OUTPATIENT)
Dept: VASCULAR SURGERY | Facility: CLINIC | Age: 83
End: 2023-02-21

## 2023-02-21 VITALS
HEART RATE: 74 BPM | WEIGHT: 133.1 LBS | SYSTOLIC BLOOD PRESSURE: 120 MMHG | DIASTOLIC BLOOD PRESSURE: 70 MMHG | HEIGHT: 61 IN | BODY MASS INDEX: 25.13 KG/M2

## 2023-02-21 DIAGNOSIS — I65.23 CAROTID STENOSIS, BILATERAL: Primary | ICD-10-CM

## 2023-02-21 DIAGNOSIS — R42 SEVERE DIZZINESS: ICD-10-CM

## 2023-02-21 NOTE — PATIENT INSTRUCTIONS
Presents with moderate to severe dizziness with bilateral patent vertebral arteries  Her right carotid endarterectomy site is widely patent she does have an approximate 70% left internal carotid artery stenosis which would not be an etiology for dizziness  Because of discrepancy in the carotid imaging I am ordering a carotid Doppler in 3 months  Also requesting an urgent ENT consult as her dizziness is quite severe    Plan: Carotid Doppler in 3 months an ear nose and throat consult on an urgent basis

## 2023-02-21 NOTE — PROGRESS NOTES
Assessment/Plan:    Presents with moderate to severe dizziness with bilateral patent vertebral arteries  Her right carotid endarterectomy site is widely patent she does have an approximate 70% left internal carotid artery stenosis which would not be an etiology for dizziness  Because of discrepancy in the carotid imaging I am ordering a carotid Doppler in 3 months  Also requesting an urgent ENT consult as her dizziness is quite severe    Plan: Carotid Doppler in 3 months an ear nose and throat consult on an urgent basis  Diagnoses and all orders for this visit:    Carotid stenosis, bilateral  -     VAS carotid complete study; Future    Severe dizziness  -     Ambulatory Referral to Otolaryngology; Future        Subjective:      Patient ID: vEa Koenig is a 80 y o  female  Patient present to discuss Plavix  Patient c/o dizziness and and feeling fuzzy  Patient states she still have pain on legs after walking for 30min  Patient states R L  Patient last MIKAEL was July of 2022 and CV December of 2022  Patient is currently taking ASA, Simvastatin and Plavix  Patient smokes     HPI    The following portions of the patient's history were reviewed and updated as appropriate: allergies, current medications, past family history, past medical history, past social history, past surgical history and problem list     Review of Systems   Constitutional: Negative  HENT: Negative  Eyes: Negative  Respiratory: Negative  Cardiovascular: Negative  Gastrointestinal: Negative  Endocrine: Negative  Genitourinary: Negative  Musculoskeletal: Negative  Skin: Negative  Allergic/Immunologic: Negative  Neurological: Negative  Hematological: Negative  Psychiatric/Behavioral: Negative  Objective: There were no vitals taken for this visit  Physical Exam  Bilateral carotid arteries equal bilaterally, no bruit on the right audible bruit on the left      I have reviewed and made appropriate changes to the review of systems input by the medical assistant      Vitals:    02/21/23 1106   BP: 120/70   BP Location: Right arm   Patient Position: Sitting   Cuff Size: Adult   Pulse: 74   Weight: 60 4 kg (133 lb 1 6 oz)   Height: 5' 1" (1 549 m)       Patient Active Problem List   Diagnosis   • Smoker   • Peripheral arterial occlusive disease (HCC)   • Proximal humerus fracture   • Atherosclerosis of native arteries of extremities with intermittent claudication, unspecified extremity ([facility])   • COPD (chronic obstructive pulmonary disease) ([facility])   • Degeneration of intervertebral disc   • Hyperlipidemia   • Iliac artery stenosis, bilateral ([facility])   • Carotid stenosis, bilateral   • Osteoarthritis   • Osteopenia   • Varicose veins of right lower extremity with pain   • Vitamin D deficiency   • Age-related nuclear cataract of both eyes   • Open angle with borderline findings, low risk, bilateral   • Disorder of arteries and arterioles, unspecified ([facility])   • Stricture of artery ([facility])   • CKD (chronic kidney disease) stage 3, GFR 30-59 ml/min ([facility])   • Fall   • Embolism and thrombosis of arteries of the lower extremities ([address] )   • Preoperative cardiovascular examination   • Severe dizziness       Past Surgical History:   Procedure Laterality Date   • APPENDECTOMY     • BREAST SURGERY      gilberto   • COLONOSCOPY     • HEMORRHOID SURGERY     • IR AORTAGRAM WITH RUN-OFF  8/26/2019   • ND ARTHROPLASTY GLENOHUMERAL JOINT TOTAL SHOULDER Right 12/8/2016    Procedure: ARTHROPLASTY SHOULDER REVERSE;  Surgeon: Mauri Lees MD;  [facility];  Service: Orthopedics   • ND TEAEC W/PATCH GRF CAROTID ALFREDOTB Roger Right 9/28/2022    Procedure: ENDARTERECTOMY ARTERY CAROTID w/bovine patch completion duplex imaging;  Surgeon: Ladarius Madison MD;  [facility];  Service: Vascular   • THROMBOENDARTERECTOMY     • TONSILLECTOMY         Family History   Problem Relation Age of Onset   • No Known Problems Mother    • No Known Problems Father        Social History     Socioeconomic History   • Marital status:      Spouse name: Not on file   • Number of children: Not on file   • Years of education: Not on file   • Highest education level: Not on file   Occupational History   • Not on file   Tobacco Use   • Smoking status: Every Day     Packs/day: 0 50     Years: 64 00     Pack years: 32 00     Types: Cigarettes   • Smokeless tobacco: Never   • Tobacco comments:     smoked cigarette at 0400 9 28 22   Vaping Use   • Vaping Use: Never used   Substance and Sexual Activity   • Alcohol use: Not Currently     Alcohol/week: 2 0 standard drinks     Types: 2 Cans of beer per week     Comment: glass of beer twice a week   • Drug use: No   • Sexual activity: Not Currently   Other Topics Concern   • Not on file   Social History Narrative   • Not on file     Social Determinants of Health     Financial Resource Strain: High Risk   • Difficulty of Paying Living Expenses: Hard   Food Insecurity: Not on file   Transportation Needs: No Transportation Needs   • Lack of Transportation (Medical): No   • Lack of Transportation (Non-Medical):  No   Physical Activity: Not on file   Stress: Not on file   Social Connections: Not on file   Intimate Partner Violence: Not on file   Housing Stability: Not on file       Allergies   Allergen Reactions   • Celecoxib GI Intolerance   • Other Rash     Adhesive tape   • Chocolate - Food Allergy    • Cilostazol Edema     rash   • Sutures  [Suture]    • Wound Dressing Adhesive      Other reaction(s): Itching         Current Outpatient Medications:   •  Ascorbic Acid (vitamin C) 1000 MG tablet, Take 1,000 mg by mouth daily, Disp: , Rfl:   •  aspirin 81 MG tablet, Take 81 mg by mouth daily Stopped taking 9/19/22 for sx, Disp: , Rfl:   •  Calcium Citrate-Vitamin D (CALCIUM + D PO), Take 2 tablets by mouth daily at bedtime, Disp: , Rfl:   •  clopidogrel (PLAVIX) 75 mg tablet, Take 1 tablet (75 mg total) by mouth daily, Disp: 90 tablet, Rfl: 0  •  simvastatin (ZOCOR) 40 mg tablet, TAKE 1 TABLET BY MOUTH DAILY AT BEDTIME, Disp: 90 tablet, Rfl: 1

## 2023-05-30 ENCOUNTER — HOSPITAL ENCOUNTER (OUTPATIENT)
Dept: NON INVASIVE DIAGNOSTICS | Facility: CLINIC | Age: 83
Discharge: HOME | End: 2023-05-30

## 2023-05-30 DIAGNOSIS — I65.23 CAROTID STENOSIS, BILATERAL: ICD-10-CM

## 2023-06-08 ENCOUNTER — OFFICE VISIT (OUTPATIENT)
Dept: VASCULAR SURGERY | Facility: CLINIC | Age: 83
End: 2023-06-08
Payer: COMMERCIAL

## 2023-06-08 VITALS
DIASTOLIC BLOOD PRESSURE: 82 MMHG | BODY MASS INDEX: 27.56 KG/M2 | SYSTOLIC BLOOD PRESSURE: 152 MMHG | WEIGHT: 146 LBS | HEART RATE: 64 BPM | HEIGHT: 61 IN

## 2023-06-08 DIAGNOSIS — I65.23 CAROTID STENOSIS, BILATERAL: Primary | ICD-10-CM

## 2023-06-08 DIAGNOSIS — R42 VERTIGO: ICD-10-CM

## 2023-06-08 PROCEDURE — 99213 OFFICE O/P EST LOW 20 MIN: CPT | Performed by: SURGERY

## 2023-06-08 NOTE — ASSESSMENT & PLAN NOTE
Presents for further evaluation regarding carotid artery stenosis  Her right carotid is widely patent status post carotid endarterectomy in the recent past, her left internal carotid does have a moderate approximate 60% stenosis and she remains asymptomatic      Plan: Follow-up carotid Doppler in 6 months, also recommending [facility] to evaluate her dizziness

## 2023-06-08 NOTE — PROGRESS NOTES
Assessment/Plan:    Presents for further evaluation regarding carotid artery stenosis  Her right carotid is widely patent status post carotid endarterectomy in the recent past, her left internal carotid does have a moderate approximate 60% stenosis and she remains asymptomatic  Plan: Follow-up carotid Doppler in 6 months, also recommending [facility] to evaluate her dizziness     Diagnoses and all orders for this visit:    Carotid stenosis, bilateral  -     VAS carotid complete study; Future    Vertigo  -     Ambulatory Referral to Physical Therapy; Future        Subjective:      Patient ID: Meche Sheth is a 80 y o  female  Patient presents to rev CV done 5/30, s/p R CEA 9/28/22  Denies s/s CVA  HPI    The following portions of the patient's history were reviewed and updated as appropriate: allergies, current medications, past family history, past medical history, past social history, past surgical history and problem list     Review of Systems   Constitutional: Negative  HENT: Negative  Eyes: Negative  Respiratory: Negative  Cardiovascular: Negative  Gastrointestinal: Negative  Endocrine: Negative  Genitourinary: Negative  Musculoskeletal: Negative  Skin: Negative  Allergic/Immunologic: Negative  Neurological: Positive for dizziness  Hematological: Negative  Psychiatric/Behavioral: Negative  Objective: There were no vitals taken for this visit  Physical Exam      Discussion regarding her continued dizziness I am recommending physical therapy    [facility]/Jose J Palacio

## 2023-06-26 ENCOUNTER — OFFICE VISIT (OUTPATIENT)
Dept: FAMILY MEDICINE CLINIC | Facility: CLINIC | Age: 83
End: 2023-06-26
Payer: COMMERCIAL

## 2023-06-26 VITALS
DIASTOLIC BLOOD PRESSURE: 78 MMHG | SYSTOLIC BLOOD PRESSURE: 138 MMHG | OXYGEN SATURATION: 96 % | RESPIRATION RATE: 16 BRPM | BODY MASS INDEX: 27.94 KG/M2 | HEART RATE: 80 BPM | TEMPERATURE: 98.2 F | WEIGHT: 148 LBS | HEIGHT: 61 IN

## 2023-06-26 DIAGNOSIS — J44.9 CHRONIC OBSTRUCTIVE PULMONARY DISEASE, UNSPECIFIED COPD TYPE (HCC): Primary | ICD-10-CM

## 2023-06-26 DIAGNOSIS — R42 VERTIGO: ICD-10-CM

## 2023-06-26 DIAGNOSIS — M15.9 PRIMARY OSTEOARTHRITIS INVOLVING MULTIPLE JOINTS: ICD-10-CM

## 2023-06-26 DIAGNOSIS — I65.23 CAROTID STENOSIS, BILATERAL: ICD-10-CM

## 2023-06-26 DIAGNOSIS — I77.9 PERIPHERAL ARTERIAL OCCLUSIVE DISEASE (HCC): ICD-10-CM

## 2023-06-26 DIAGNOSIS — R42 SEVERE DIZZINESS: ICD-10-CM

## 2023-06-26 DIAGNOSIS — F17.200 SMOKER: ICD-10-CM

## 2023-06-26 DIAGNOSIS — E55.9 VITAMIN D DEFICIENCY: ICD-10-CM

## 2023-06-26 PROCEDURE — 99214 OFFICE O/P EST MOD 30 MIN: CPT | Performed by: FAMILY MEDICINE

## 2023-06-26 NOTE — PROGRESS NOTES
Name: Talat Current      : 1940      MRN: [id number]  Encounter Provider: Erin Ochoa DO  Encounter Date: 2023   Encounter department: [address] [place] [address]   Patient to continue present treatment  Patient is being referred to Dr Negin Hobson, ENT for second opinion  Patient to follow a low-fat and a low salt diet and get regular exercise walking as tolerated  Recommend patient discontinue smoking  Follow-up with specialist as scheduled and return to the office in 6 months  1  Chronic obstructive pulmonary disease, unspecified COPD type ([address] )    2  Peripheral arterial occlusive disease (HCC)    3  Carotid stenosis, bilateral    4  Primary osteoarthritis involving multiple joints    5  Vertigo  -     Ambulatory Referral to Otolaryngology; Future    6  Severe dizziness  -     Ambulatory Referral to Otolaryngology; Future    7  Smoker    8  Vitamin D deficiency           Subjective      Patient is here for follow-up appoint for chronic conditions  Patient status post carotid artery surgery with Dr Josué Strong at [place] [address] vascular and he referred patient to [place] [address] [facility] regarding dizziness although patient was not satisfied with the evaluation and requests another opinion  Patient admits to dizziness and buzzing or ringing in her ears/head since carotid surgery  Patient continues to smoke half pack of cigarettes daily  Hyperlipidemia  This is a chronic problem  The problem is controlled  She has no history of diabetes or hypothyroidism  Associated symptoms include leg pain  Pertinent negatives include no chest pain, focal sensory loss, focal weakness, myalgias or shortness of breath  Current antihyperlipidemic treatment includes statins  The current treatment provides significant improvement of lipids  There are no compliance problems  Risk factors for coronary artery disease include dyslipidemia, post-menopausal and family history       Review of Systems "  Respiratory: Negative for shortness of breath  Cardiovascular: Negative for chest pain  Musculoskeletal: Negative for myalgias  Neurological: Negative for focal weakness  Current Outpatient Medications on File Prior to Visit   Medication Sig   • Ascorbic Acid (vitamin C) 1000 MG tablet Take 1,000 mg by mouth daily   • Calcium Citrate-Vitamin D (CALCIUM + D PO) Take 2 tablets by mouth daily at bedtime   • clopidogrel (PLAVIX) 75 mg tablet Take 1 tablet (75 mg total) by mouth daily   • simvastatin (ZOCOR) 40 mg tablet TAKE 1 TABLET BY MOUTH DAILY AT BEDTIME   • [DISCONTINUED] aspirin 81 MG tablet Take 81 mg by mouth daily Stopped taking 9/19/22 for sx (Patient not taking: Reported on 6/8/2023)       Objective     /78 (BP Location: Left arm, Patient Position: Sitting, Cuff Size: Standard)   Pulse 80   Temp 98 2 °F (36 8 °C) (Tympanic)   Resp 16   Ht 5' 1\" (1 549 m)   Wt 67 1 kg (148 lb)   SpO2 96%   BMI 27 96 kg/m²     Physical Exam  Constitutional:       General: She is not in acute distress  Appearance: Normal appearance  HENT:      Head: Normocephalic  Mouth/Throat:      Mouth: Mucous membranes are moist    Eyes:      General: No scleral icterus  Conjunctiva/sclera: Conjunctivae normal    Neck:      Vascular: No carotid bruit  Cardiovascular:      Rate and Rhythm: Normal rate and regular rhythm  Pulmonary:      Effort: Pulmonary effort is normal  No respiratory distress  Breath sounds: Wheezing present  Abdominal:      Palpations: Abdomen is soft  Tenderness: There is no abdominal tenderness  Musculoskeletal:      Cervical back: Neck supple  Right lower leg: No edema  Left lower leg: No edema  Lymphadenopathy:      Cervical: No cervical adenopathy  Skin:     General: Skin is warm and dry  Neurological:      General: No focal deficit present  Mental Status: She is alert and oriented to person, place, and time     Psychiatric:         " Mood and Affect: Mood normal          Behavior: Behavior normal          Thought Content:  Thought content normal          Judgment: Judgment normal        Tere Bailey DO

## 2023-07-20 DIAGNOSIS — I70.211 ATHEROSCLEROSIS OF NATIVE ARTERY OF RIGHT LOWER EXTREMITY WITH INTERMITTENT CLAUDICATION (HCC): ICD-10-CM

## 2023-07-21 RX ORDER — CLOPIDOGREL BISULFATE 75 MG/1
75 TABLET ORAL DAILY
Qty: 90 TABLET | Refills: 0 | Status: SHIPPED | OUTPATIENT
Start: 2023-07-21

## 2023-08-03 ENCOUNTER — TELEPHONE (OUTPATIENT)
Dept: VASCULAR SURGERY | Facility: CLINIC | Age: 83
End: 2023-08-03

## 2023-08-03 DIAGNOSIS — I70.211 ATHEROSCLEROSIS OF NATIVE ARTERY OF RIGHT LOWER EXTREMITY WITH INTERMITTENT CLAUDICATION (HCC): Primary | ICD-10-CM

## 2023-08-03 NOTE — TELEPHONE ENCOUNTER
MIKAEL scheduled 8/31/23 at [address]/ GERTRUDIS 9/13/23 in 248. Pt is checking with family for transportation and will call back if needed. Gave [phone] opt 3 for scheduling.

## 2023-08-03 NOTE — TELEPHONE ENCOUNTER
Pt called. Since mid July she has been experiencing pain in her right lateral calf area that goes up into her knee and hip area during ambulation. She used to be able to walk a full block, but now has pain after walking 1/4 block. Denies rest pain. No open areas. Occasional bilateral foot edema, which comes and goes during the day. Pt also experiences some claudication in the left leg, but the right leg is much worse. Pt was questioning if she should have a doppler. Routed to triage to advise.

## 2023-08-03 NOTE — TELEPHONE ENCOUNTER
Spoke with pt and made her aware of the recommendations. Transferred to the Call Center to schedule the MIKAEL and OV after to discuss results.

## 2023-08-03 NOTE — TELEPHONE ENCOUNTER
Chart reviewed. Last LEAD >1 year ago. Please get b/l MIKAEL and schedule OV to evaluate symptoms and review imaging.

## 2023-08-25 DIAGNOSIS — E78.5 HYPERLIPIDEMIA, UNSPECIFIED HYPERLIPIDEMIA TYPE: ICD-10-CM

## 2023-08-25 RX ORDER — SIMVASTATIN 40 MG
40 TABLET ORAL
Qty: 90 TABLET | Refills: 1 | Status: SHIPPED | OUTPATIENT
Start: 2023-08-25

## 2023-09-19 ENCOUNTER — OFFICE VISIT (OUTPATIENT)
Dept: FAMILY MEDICINE CLINIC | Facility: CLINIC | Age: 83
End: 2023-09-19
Payer: COMMERCIAL

## 2023-09-19 VITALS
HEART RATE: 68 BPM | TEMPERATURE: 98 F | RESPIRATION RATE: 16 BRPM | HEIGHT: 61 IN | DIASTOLIC BLOOD PRESSURE: 64 MMHG | OXYGEN SATURATION: 98 % | BODY MASS INDEX: 28.25 KG/M2 | SYSTOLIC BLOOD PRESSURE: 160 MMHG | WEIGHT: 149.6 LBS

## 2023-09-19 DIAGNOSIS — M79.661 RIGHT CALF PAIN: Primary | ICD-10-CM

## 2023-09-19 PROCEDURE — 99214 OFFICE O/P EST MOD 30 MIN: CPT | Performed by: FAMILY MEDICINE

## 2023-09-19 NOTE — PROGRESS NOTES
Name: Hema Speaker      : 1940      MRN: [id number]  Encounter Provider: Raj Mercado DO  Encounter Date: 2023   Encounter department: [address] [place] [address]   Patient will be scheduled for venous duplex of the right lower extremity. We will heed results. Recommend rest and leg elevation. Follow-up with specialist as scheduled and return to the office in 3 months as scheduled or call sooner as needed. 1. Right calf pain  -     VAS lower limb venous duplex study, unilateral/limited; Future; Expected date: 2023           Subjective      Patient complains of right lateral calf pain for the past 4 to 5 weeks. She denies any specific injury or fall. Patient denies any leg swelling. Patient states pain feels muscular and not like her typical claudication pain. Patient denies history of DVT. Patient denies chest pain or shortness of breath. Patient is scheduled for lower extremity arterial Dopplers on 10/10/2023 and carotid ultrasound on 2023 and then an appointment with [facility] vascular surgery on 11/10/2023. Leg Pain   The incident occurred more than 1 week ago. There was no injury mechanism. Pain location: right calf lateral. The quality of the pain is described as shooting, stabbing and aching. The pain has been intermittent since onset. Pertinent negatives include no inability to bear weight, loss of motion, muscle weakness, numbness or tingling. The symptoms are aggravated by weight bearing. She has tried rest and acetaminophen for the symptoms. The treatment provided mild relief. Review of Systems   Neurological: Negative for tingling and numbness.        Current Outpatient Medications on File Prior to Visit   Medication Sig   • Ascorbic Acid (vitamin C) 1000 MG tablet Take 1,000 mg by mouth daily   • Calcium Citrate-Vitamin D (CALCIUM + D PO) Take 2 tablets by mouth daily at bedtime   • clopidogrel (PLAVIX) 75 mg tablet TAKE 1 TABLET BY MOUTH EVERY DAY   • simvastatin (ZOCOR) 40 mg tablet TAKE 1 TABLET BY MOUTH EVERYDAY AT BEDTIME       Objective     /64   Pulse 68   Temp 98 °F (36.7 °C)   Resp 16   Ht 5' 1" (1.549 m)   Wt 67.9 kg (149 lb 9.6 oz)   SpO2 98%   BMI 28.27 kg/m²     Physical Exam  Constitutional:       General: She is not in acute distress. Appearance: Normal appearance. HENT:      Head: Normocephalic. Mouth/Throat:      Mouth: Mucous membranes are moist.   Eyes:      General: No scleral icterus. Conjunctiva/sclera: Conjunctivae normal.   Cardiovascular:      Rate and Rhythm: Normal rate and regular rhythm. Pulmonary:      Effort: Pulmonary effort is normal.      Breath sounds: Normal breath sounds. Abdominal:      Palpations: Abdomen is soft. Tenderness: There is no abdominal tenderness. Musculoskeletal:         General: Tenderness present. Cervical back: Neck supple. Right lower leg: No edema. Left lower leg: No edema. Comments: Mild tenderness along right lateral calf. Negative posterior calf or popliteal tenderness. Negative Homans' sign. Lymphadenopathy:      Cervical: No cervical adenopathy. Skin:     General: Skin is warm and dry. Neurological:      General: No focal deficit present. Mental Status: She is alert and oriented to person, place, and time. Psychiatric:         Mood and Affect: Mood normal.         Behavior: Behavior normal.         Thought Content:  Thought content normal.         Judgment: Judgment normal.       Louann Perez DO

## 2023-09-20 ENCOUNTER — HOSPITAL ENCOUNTER (OUTPATIENT)
Dept: VASCULAR ULTRASOUND | Facility: HOSPITAL | Age: 83
Discharge: HOME | End: 2023-09-20
Payer: COMMERCIAL

## 2023-09-20 DIAGNOSIS — M79.661 RIGHT CALF PAIN: ICD-10-CM

## 2023-09-20 PROCEDURE — 93971 EXTREMITY STUDY: CPT | Performed by: SURGERY

## 2023-09-20 PROCEDURE — 93971 EXTREMITY STUDY: CPT

## 2023-10-09 ENCOUNTER — TELEPHONE (OUTPATIENT)
Dept: FAMILY MEDICINE CLINIC | Facility: CLINIC | Age: 83
End: 2023-10-09

## 2023-10-09 NOTE — TELEPHONE ENCOUNTER
Received a VM from the patient stating she has been putting heat on her leg and it has been helping. She wanted to know what else you would like her to do. She cannot due P.T. due to having no transportation. Thanks!

## 2023-10-10 NOTE — TELEPHONE ENCOUNTER
Received a call back from the patient. Informed her of the recommendations. She said she will try that for about 2 weeks then contact us to let us know how it's going. She may consider P.T. due to her daughter telling her there's an office close to her house.

## 2023-10-10 NOTE — TELEPHONE ENCOUNTER
LMOM to notify pt of recommendations. Ok per Medical Communication Consent Form.  Advised to call back with any questions/concerns

## 2023-10-18 NOTE — TELEPHONE ENCOUNTER
Note written that patient is no longer on Plavix  Please notify patient  Pending Prescriptions:                       Disp   Refills    traZODone (DESYREL) 50 MG tablet [Pharmac*90 tab*0            Sig: TAKE 1 TABLET BY MOUTH AT BEDTIME NO  FURTHER            REFILLS  UNTIL  SEEN  BY  PROVIDER    Routing refill request to provider for review/approval because:  Drug interaction warning      Jay Fuentes RN

## 2023-11-02 ENCOUNTER — TELEPHONE (OUTPATIENT)
Dept: FAMILY MEDICINE CLINIC | Facility: CLINIC | Age: 83
End: 2023-11-02

## 2023-11-02 NOTE — TELEPHONE ENCOUNTER
Received a call from the patient asking if she should get the pneumonia shot. She is getting her flu shot tomorrow (not at our office) and can get the pneumonia shot at the same time, if you recommend it. Thanks!

## 2023-11-06 ENCOUNTER — HOSPITAL ENCOUNTER (OUTPATIENT)
Dept: NON INVASIVE DIAGNOSTICS | Facility: CLINIC | Age: 83
Discharge: HOME | End: 2023-11-06
Payer: COMMERCIAL

## 2023-11-06 DIAGNOSIS — I65.23 CAROTID STENOSIS, BILATERAL: ICD-10-CM

## 2023-11-06 DIAGNOSIS — I70.211 ATHEROSCLEROSIS OF NATIVE ARTERY OF RIGHT LOWER EXTREMITY WITH INTERMITTENT CLAUDICATION (HCC): ICD-10-CM

## 2023-11-06 PROCEDURE — 93922 UPR/L XTREMITY ART 2 LEVELS: CPT | Performed by: SURGERY

## 2023-11-06 PROCEDURE — 93923 UPR/LXTR ART STDY 3+ LVLS: CPT

## 2023-11-06 PROCEDURE — 93925 LOWER EXTREMITY STUDY: CPT

## 2023-11-06 PROCEDURE — 93880 EXTRACRANIAL BILAT STUDY: CPT

## 2023-11-06 PROCEDURE — 93925 LOWER EXTREMITY STUDY: CPT | Performed by: SURGERY

## 2023-11-06 PROCEDURE — 93880 EXTRACRANIAL BILAT STUDY: CPT | Performed by: SURGERY

## 2023-11-10 ENCOUNTER — OFFICE VISIT (OUTPATIENT)
Dept: VASCULAR SURGERY | Facility: CLINIC | Age: 83
End: 2023-11-10
Payer: COMMERCIAL

## 2023-11-10 VITALS
BODY MASS INDEX: 27.56 KG/M2 | HEIGHT: 61 IN | HEART RATE: 98 BPM | DIASTOLIC BLOOD PRESSURE: 82 MMHG | SYSTOLIC BLOOD PRESSURE: 162 MMHG | WEIGHT: 146 LBS

## 2023-11-10 DIAGNOSIS — I65.23 CAROTID STENOSIS, BILATERAL: Primary | ICD-10-CM

## 2023-11-10 DIAGNOSIS — I77.9 PERIPHERAL ARTERIAL OCCLUSIVE DISEASE (HCC): ICD-10-CM

## 2023-11-10 PROCEDURE — 99213 OFFICE O/P EST LOW 20 MIN: CPT

## 2023-11-10 NOTE — ASSESSMENT & PLAN NOTE
Patient is reporting pain in her right lateral calf area that radiates up into her knee and hip during ambulation. This has been ongoing since July. She used to be able to walk a full block, but now has pain after walking 1/4 block. Pain occurs with prolonged ambulation and when she goes from a sitting to standing position. Patient is reporting concurrent low back pain. Pain is similar to her sciatica pain which she reports is chronic and occurs to bilateral lower extremities. She is currently denying any rest pain, numbness/tingling, color/temperature change or tissue loss. Imaging:  LEAD 11/6/23:  Short segment high grade stenosis vs occlusion in the right distal SFA. >75% stenosis in the right proximal popliteal artery. Acoustic shadows from calcified plaque may obscure more significant disease. 50-75% stenosis in the left CFA and a high grade stenosis vs occlusion of the left distal SFA and proximal popliteal arteries. R JAVID: 0.81/131/95, L JAVID: 0.82/119/105    Plan:  -We discussed the pathophysiology of peripheral arterial disease as well as contributing lifestyle factors.  -We discussed the results of LEAD at length, progression of disease to right lower extremity however preserved ABIs. Patient lower extremity pain appears to be neuropathic in nature.  -Continue medical optimization with clopidogrel and simvastatin. Stressed the importance of compliance with recommended medications.  -The long-term benefits of smoking cessation were explained to the patient. Including but not limited to decreased mortality and morbidity from cardiovascular causes, respiratory diseases and decrease cancer risk. Strong recommendation for cessation was given to the patient. Emotional support was given.   -Will repeat LEAD in 6 months  -Instructed patient to notify office of any worsening claudication, rest pain, or tissue loss  -Instructed patient to report to the ED for any symptoms of acute limb ischemia (color/temperature change, motor/sensory loss, tissue loss).

## 2023-11-10 NOTE — ASSESSMENT & PLAN NOTE
Patient is denying any TIA/CVA symptoms (amaurosis fugax, slurred speech, facial droop, unilateral weakness, dysphagia, headache). Imaging:  Carotid duplex 11/6/2023: Widely patent R ICA endarterctomy. Progression of left ICA stenosis with further elevated velocities (70-99% stenosis, , , ratio 7.41). Plan:  -We discussed the pathophysiology of carotid artery disease as well as contributing lifestyle factors.  -We discussed the results of carotid duplex specifically worsening stenosis to left ICA. -Order placed for CTA head and neck to further evaluate left ICA stenosis.   -Order placed for BMP to check renal function prior to CTA  -Continue medical optimization with simvastatin and clopidogrel.  -Instructed patient to report to the ED for any TIA/CVA symptoms  -Patient will return to the office after CTA to discuss results with surgeon

## 2023-11-10 NOTE — PROGRESS NOTES
Assessment/Plan:    Peripheral arterial occlusive disease  Patient is reporting pain in her right lateral calf area that radiates up into her knee and hip during ambulation. This has been ongoing since July. She used to be able to walk a full block, but now has pain after walking 1/4 block. Pain occurs with prolonged ambulation and when she goes from a sitting to standing position. Patient is reporting concurrent low back pain. Pain is similar to her sciatica pain which she reports is chronic and occurs to bilateral lower extremities. She is currently denying any rest pain, numbness/tingling, color/temperature change or tissue loss. Imaging:  LEAD 11/6/23:  Short segment high grade stenosis vs occlusion in the right distal SFA. >75% stenosis in the right proximal popliteal artery. Acoustic shadows from calcified plaque may obscure more significant disease. 50-75% stenosis in the left CFA and a high grade stenosis vs occlusion of the left distal SFA and proximal popliteal arteries. R JAVID: 0.81/131/95, L JAVID: 0.82/119/105    Plan:  -We discussed the pathophysiology of peripheral arterial disease as well as contributing lifestyle factors.  -We discussed the results of LEAD at length, progression of disease to right lower extremity however preserved ABIs. Patient lower extremity pain appears to be neuropathic in nature.  -Continue medical optimization with clopidogrel and simvastatin. Stressed the importance of compliance with recommended medications.  -The long-term benefits of smoking cessation were explained to the patient. Including but not limited to decreased mortality and morbidity from cardiovascular causes, respiratory diseases and decrease cancer risk. Strong recommendation for cessation was given to the patient. Emotional support was given.   -Will repeat LEAD in 6 months  -Instructed patient to notify office of any worsening claudication, rest pain, or tissue loss  -Instructed patient to report to the ED for any symptoms of acute limb ischemia (color/temperature change, motor/sensory loss, tissue loss). Carotid stenosis, bilateral  Patient is denying any TIA/CVA symptoms (amaurosis fugax, slurred speech, facial droop, unilateral weakness, dysphagia, headache). Imaging:  Carotid duplex 11/6/2023: Widely patent R ICA endarterctomy. Progression of left ICA stenosis with further elevated velocities (70-99% stenosis, , , ratio 7.41). Plan:  -We discussed the pathophysiology of carotid artery disease as well as contributing lifestyle factors.  -We discussed the results of carotid duplex specifically worsening stenosis to left ICA. -Order placed for CTA head and neck to further evaluate left ICA stenosis. -Order placed for BMP to check renal function prior to CTA  -Continue medical optimization with simvastatin and clopidogrel.  -Instructed patient to report to the ED for any TIA/CVA symptoms  -Patient will return to the office after CTA to discuss results with surgeon       Diagnoses and all orders for this visit:    Carotid stenosis, bilateral  -     CTA head and neck w wo contrast; Future  -     Basic metabolic panel; Future    Peripheral arterial occlusive disease (HCC)  -     VAS lower limb arterial duplex, complete bilateral; Future          Subjective:      Patient ID: Holley Garrison is a 80 y.o. female. Patient is an 80-year-old female, current smoker (1/2 PPD), with PMH COPD, HLD, HTN, bilateral carotid artery stenosis s/p R CEA 9/28/22 [facility]), CKD 3, PAD, and aortoiliac occlusive disease. Patient is presenting to the vascular surgery office to review results of carotid duplex and LEAD completed 11/6/2023. Patient is reporting pain in her right lateral calf area that radiates up into her knee and hip during ambulation. This has been ongoing since July. She used to be able to walk a full block, but now has pain after walking 1/4 block.  Pain occurs with prolonged ambulation and when she goes from a sitting to standing position. Patient is reporting concurrent low back pain. Pain is similar to her sciatica pain which she reports is chronich and occurs to bilateral lower extremities. She is currently denying any rest pain, numbness/tingling, color/temperature change or tissue loss. Patient is also denying any TIA/CVA symptoms (amaurosis fugax, slurred speech, facial droop, unilateral weakness, dysphagia, headache). She does report increased stress at this time related to her daughter's cancer diagnosis. Patient is active and independent with ADLs, currently assisting her daughter with ADLs. Of note patient reports that she has been taking her simvastatin and clopidogrel inconsistently. Reports that she takes her medications "whenever I feel like it or remember". The following portions of the patient's history were reviewed and updated as appropriate: allergies, current medications, past family history, past medical history, past social history, past surgical history, and problem list.    Review of Systems   Constitutional:  Positive for activity change. HENT:  Negative for trouble swallowing and voice change. Eyes:  Negative for visual disturbance. Respiratory:  Negative for shortness of breath. Cardiovascular:  Negative for chest pain and leg swelling. Musculoskeletal:         BLE pain - constant   Skin:  Negative for color change, pallor and wound. Neurological:  Negative for facial asymmetry, speech difficulty, weakness and numbness. All other systems reviewed and are negative. Objective:    /82 (BP Location: Left arm, Patient Position: Sitting, Cuff Size: Standard)   Pulse 98   Ht 5' 1" (1.549 m)   Wt 66.2 kg (146 lb)   BMI 27.59 kg/m²        Physical Exam  Vitals reviewed. Constitutional:       General: She is not in acute distress. Appearance: Normal appearance. HENT:      Head: Normocephalic and atraumatic.    Neck: Vascular: Carotid bruit (left) present. Cardiovascular:      Rate and Rhythm: Normal rate and regular rhythm. Pulses:           Carotid pulses are 1+ on the right side and 1+ on the left side. Radial pulses are 2+ on the right side and 2+ on the left side. Dorsalis pedis pulses are 1+ on the right side and 1+ on the left side. Heart sounds: Normal heart sounds. No murmur heard. Pulmonary:      Effort: Pulmonary effort is normal. No respiratory distress. Breath sounds: Normal breath sounds. Musculoskeletal:         General: No swelling or tenderness. Cervical back: Normal range of motion and neck supple. No tenderness. Right lower leg: No edema. Left lower leg: No edema. Skin:     General: Skin is warm and dry. Capillary Refill: Capillary refill takes less than 2 seconds. Neurological:      General: No focal deficit present. Mental Status: She is alert and oriented to person, place, and time. Psychiatric:         Mood and Affect: Mood normal.         Behavior: Behavior normal.     I have reviewed and made appropriate changes to the review of systems input by the medical assistant.     Vitals:    11/10/23 1115   BP: 162/82   BP Location: Left arm   Patient Position: Sitting   Cuff Size: Standard   Pulse: 98   Weight: 66.2 kg (146 lb)   Height: 5' 1" (1.549 m)       Patient Active Problem List   Diagnosis    Smoker    Peripheral arterial occlusive disease (HCC)    Proximal humerus fracture    Atherosclerosis of native arteries of extremities with intermittent claudication, unspecified extremity (HCC)    COPD (chronic obstructive pulmonary disease) (HCC)    Degeneration of intervertebral disc    Hyperlipidemia    Iliac artery stenosis, bilateral (HCC)    Carotid stenosis, bilateral    Osteoarthritis    Osteopenia    Varicose veins of right lower extremity with pain    Vitamin D deficiency    Age-related nuclear cataract of both eyes    Open angle with borderline findings, low risk, bilateral    Disorder of arteries and arterioles, unspecified (HCC)    Stricture of artery (HCC)    CKD (chronic kidney disease) stage 3, GFR 30-59 ml/min (HCC)    Fall    Embolism and thrombosis of arteries of the lower extremities (HCC)    Preoperative cardiovascular examination    Severe dizziness    Vertigo       Past Surgical History:   Procedure Laterality Date    APPENDECTOMY      BREAST SURGERY      gilberto    COLONOSCOPY      HEMORRHOID SURGERY      IR AORTAGRAM WITH RUN-OFF  8/26/2019    CO ARTHROPLASTY GLENOHUMERAL JOINT TOTAL SHOULDER Right 12/8/2016    Procedure: ARTHROPLASTY SHOULDER REVERSE;  Surgeon: Noemi Krishna MD;  Location: [facility];  Service: Orthopedics    CO TEAEC W/PATCH GRF CAROTID VERTB [address] Right 9/28/2022    Procedure: ENDARTERECTOMY ARTERY CAROTID w/bovine patch completion duplex imaging;  Surgeon: Darian Marques MD;  Location: [facility];  Service: Vascular    THROMBOENDARTERECTOMY      TONSILLECTOMY         Family History   Problem Relation Age of Onset    No Known Problems Mother     No Known Problems Father        Social History     Socioeconomic History    Marital status:      Spouse name: Not on file    Number of children: Not on file    Years of education: Not on file    Highest education level: Not on file   Occupational History    Not on file   Tobacco Use    Smoking status: Every Day     Packs/day: 0.50     Years: 64.00     Total pack years: 32.00     Types: Cigarettes    Smokeless tobacco: Never    Tobacco comments:     smoked cigarette at 0400 9.28.22   Vaping Use    Vaping Use: Never used   Substance and Sexual Activity    Alcohol use: Not Currently     Alcohol/week: 2.0 standard drinks of alcohol     Types: 2 Cans of beer per week     Comment: glass of beer twice a week    Drug use: No    Sexual activity: Not Currently   Other Topics Concern    Not on file   Social History Narrative    Not on file     Social Determinants of Health     Financial Resource Strain: High Risk (12/6/2022)    Overall Financial Resource Strain (CARDIA)     Difficulty of Paying Living Expenses: Hard   Food Insecurity: Not on file   Transportation Needs: No Transportation Needs (12/6/2022)    PRAPARE - Transportation     Lack of Transportation (Medical): No     Lack of Transportation (Non-Medical): No   Physical Activity: Not on file   Stress: Not on file   Social Connections: Not on file   Intimate Partner Violence: Not on file   Housing Stability: Not on file       Allergies   Allergen Reactions    Celecoxib GI Intolerance    Other Rash     Adhesive tape    Chocolate - Food Allergy     Cilostazol Edema     rash    Sutures  [Suture]     Wound Dressing Adhesive      Other reaction(s): Itching         Current Outpatient Medications:     clopidogrel (PLAVIX) 75 mg tablet, TAKE 1 TABLET BY MOUTH EVERY DAY, Disp: 90 tablet, Rfl: 0    simvastatin (ZOCOR) 40 mg tablet, TAKE 1 TABLET BY MOUTH EVERYDAY AT BEDTIME, Disp: 90 tablet, Rfl: 1    Ascorbic Acid (vitamin C) 1000 MG tablet, Take 1,000 mg by mouth daily, Disp: , Rfl:     Calcium Citrate-Vitamin D (CALCIUM + D PO), Take 2 tablets by mouth daily at bedtime, Disp: , Rfl:     I have spent a total time of 30 minutes on 11/10/23 in caring for this patient including Diagnostic results, Prognosis, Risks and benefits of tx options, Instructions for management, Patient and family education, Importance of tx compliance, Risk factor reductions, Impressions, Counseling / Coordination of care, Documenting in the medical record, Reviewing / ordering tests, medicine, procedures  , and Obtaining or reviewing history  .

## 2023-11-21 ENCOUNTER — TELEPHONE (OUTPATIENT)
Dept: RADIOLOGY | Facility: HOSPITAL | Age: 83
End: 2023-11-21

## 2023-11-21 ENCOUNTER — TELEPHONE (OUTPATIENT)
Dept: VASCULAR SURGERY | Facility: CLINIC | Age: 83
End: 2023-11-21

## 2023-11-21 NOTE — TELEPHONE ENCOUNTER
Received call from [facility] ct dept, pt needs bmp done. Order is in chart. Called pt and reminded her she needs labs, pt states she is aware however she is cx CTA scheduled for 11/24. She does not want to go to [facility]. She states that there is a [facility]'s facility in [place]  on [address] [address] that performs CT scans and she would like to have it scheduled there. Advised pt I was not aware CT's were done at this facility, pt states her daughter has had them there. Offered to transfer her to central scheduling to r/s. Pt agreed. Called central scheduling and s/w Kaushik Graves states CT's are not done at this location, she will notify pt and offer pt other locations. Pt transferred to [facility] to r/s CTA.

## 2023-11-21 NOTE — TELEPHONE ENCOUNTER
Pt's cta r/s 12/2/23 SLB. [facility] [address]  "-Patient will return to the office after CTA to discuss results with surgeon "    Please contact pt to arrange ov w/ VS to review. Thank you.

## 2023-11-27 ENCOUNTER — APPOINTMENT (OUTPATIENT)
Dept: LAB | Facility: IMAGING CENTER | Age: 83
End: 2023-11-27
Payer: COMMERCIAL

## 2023-11-27 DIAGNOSIS — I65.23 CAROTID STENOSIS, BILATERAL: ICD-10-CM

## 2023-11-27 LAB
ANION GAP SERPL CALCULATED.3IONS-SCNC: 8 MMOL/L
BUN SERPL-MCNC: 12 MG/DL (ref 5–25)
CALCIUM SERPL-MCNC: 9.6 MG/DL (ref 8.4–10.2)
CHLORIDE SERPL-SCNC: 103 MMOL/L (ref 96–108)
CO2 SERPL-SCNC: 29 MMOL/L (ref 21–32)
CREAT SERPL-MCNC: 0.93 MG/DL (ref 0.6–1.3)
GFR SERPL CREATININE-BSD FRML MDRD: 57 ML/MIN/1.73SQ M
GLUCOSE SERPL-MCNC: 91 MG/DL (ref 65–140)
POTASSIUM SERPL-SCNC: 4.3 MMOL/L (ref 3.5–5.3)
SODIUM SERPL-SCNC: 140 MMOL/L (ref 135–147)

## 2023-11-27 PROCEDURE — 80048 BASIC METABOLIC PNL TOTAL CA: CPT

## 2023-11-27 PROCEDURE — 36415 COLL VENOUS BLD VENIPUNCTURE: CPT

## 2023-11-28 ENCOUNTER — TELEPHONE (OUTPATIENT)
Dept: FAMILY MEDICINE CLINIC | Facility: CLINIC | Age: 83
End: 2023-11-28

## 2023-11-28 NOTE — TELEPHONE ENCOUNTER
Received a vm from the patient stating they found lead in her grandson's blood. She was told that everyone has lead in their body and she wants to be sure that is true.

## 2023-12-01 NOTE — TELEPHONE ENCOUNTER
Patient will call us to schedule follow up after her CT on 12/9/23 because she needs to arrange transport

## 2023-12-28 ENCOUNTER — APPOINTMENT (OUTPATIENT)
Dept: LAB | Facility: IMAGING CENTER | Age: 83
End: 2023-12-28
Payer: COMMERCIAL

## 2023-12-28 ENCOUNTER — OFFICE VISIT (OUTPATIENT)
Dept: FAMILY MEDICINE CLINIC | Facility: CLINIC | Age: 83
End: 2023-12-28
Payer: COMMERCIAL

## 2023-12-28 ENCOUNTER — HOSPITAL ENCOUNTER (OUTPATIENT)
Dept: RADIOLOGY | Facility: IMAGING CENTER | Age: 83
End: 2023-12-28
Payer: COMMERCIAL

## 2023-12-28 ENCOUNTER — HOSPITAL ENCOUNTER (OUTPATIENT)
Dept: RADIOLOGY | Facility: HOSPITAL | Age: 83
Discharge: HOME | End: 2023-12-28
Payer: COMMERCIAL

## 2023-12-28 VITALS
HEART RATE: 90 BPM | HEIGHT: 61 IN | TEMPERATURE: 98 F | SYSTOLIC BLOOD PRESSURE: 148 MMHG | WEIGHT: 149.4 LBS | BODY MASS INDEX: 28.21 KG/M2 | DIASTOLIC BLOOD PRESSURE: 80 MMHG | OXYGEN SATURATION: 98 %

## 2023-12-28 DIAGNOSIS — M79.604 RIGHT LEG PAIN: ICD-10-CM

## 2023-12-28 DIAGNOSIS — I65.23 CAROTID STENOSIS, BILATERAL: ICD-10-CM

## 2023-12-28 DIAGNOSIS — N18.30 STAGE 3 CHRONIC KIDNEY DISEASE, UNSPECIFIED WHETHER STAGE 3A OR 3B CKD (HCC): ICD-10-CM

## 2023-12-28 DIAGNOSIS — F17.200 SMOKER: ICD-10-CM

## 2023-12-28 DIAGNOSIS — I70.211 ATHEROSCLEROSIS OF NATIVE ARTERY OF RIGHT LOWER EXTREMITY WITH INTERMITTENT CLAUDICATION (HCC): ICD-10-CM

## 2023-12-28 DIAGNOSIS — I74.09 OTHER ARTERIAL EMBOLISM AND THROMBOSIS OF ABDOMINAL AORTA (HCC): ICD-10-CM

## 2023-12-28 DIAGNOSIS — J44.9 CHRONIC OBSTRUCTIVE PULMONARY DISEASE, UNSPECIFIED COPD TYPE (HCC): ICD-10-CM

## 2023-12-28 DIAGNOSIS — M79.604 RIGHT LEG PAIN: Primary | ICD-10-CM

## 2023-12-28 DIAGNOSIS — R59.0 LYMPHADENOPATHY, CERVICAL: ICD-10-CM

## 2023-12-28 PROCEDURE — 70498 CT ANGIOGRAPHY NECK: CPT

## 2023-12-28 PROCEDURE — G1004 CDSM NDSC: HCPCS

## 2023-12-28 PROCEDURE — 99214 OFFICE O/P EST MOD 30 MIN: CPT | Performed by: FAMILY MEDICINE

## 2023-12-28 PROCEDURE — 70496 CT ANGIOGRAPHY HEAD: CPT

## 2023-12-28 PROCEDURE — 73590 X-RAY EXAM OF LOWER LEG: CPT

## 2023-12-28 RX ORDER — CLOPIDOGREL BISULFATE 75 MG/1
75 TABLET ORAL DAILY
Qty: 90 TABLET | Refills: 0 | Status: SHIPPED | OUTPATIENT
Start: 2023-12-28

## 2023-12-28 RX ADMIN — IOHEXOL 85 ML: 350 INJECTION, SOLUTION INTRAVENOUS at 14:52

## 2023-12-29 ENCOUNTER — TELEPHONE (OUTPATIENT)
Dept: FAMILY MEDICINE CLINIC | Facility: CLINIC | Age: 83
End: 2023-12-29

## 2023-12-29 NOTE — TELEPHONE ENCOUNTER
Received a call from the patient stating her 12/28/23 was to be her AWV. She is asking if she can get yearly labs/urine ordered. Thanks!

## 2024-01-01 DIAGNOSIS — E55.9 VITAMIN D DEFICIENCY: ICD-10-CM

## 2024-01-01 DIAGNOSIS — E78.00 PURE HYPERCHOLESTEROLEMIA: ICD-10-CM

## 2024-01-01 DIAGNOSIS — J44.9 CHRONIC OBSTRUCTIVE PULMONARY DISEASE, UNSPECIFIED COPD TYPE (HCC): Primary | ICD-10-CM

## 2024-01-02 ENCOUNTER — HOSPITAL ENCOUNTER (OUTPATIENT)
Dept: RADIOLOGY | Facility: IMAGING CENTER | Age: 84
Discharge: HOME | End: 2024-01-02
Payer: COMMERCIAL

## 2024-01-02 DIAGNOSIS — R59.0 LYMPHADENOPATHY, CERVICAL: ICD-10-CM

## 2024-01-02 PROCEDURE — 76536 US EXAM OF HEAD AND NECK: CPT

## 2024-01-06 ENCOUNTER — APPOINTMENT (OUTPATIENT)
Dept: LAB | Facility: IMAGING CENTER | Age: 84
End: 2024-01-06
Payer: COMMERCIAL

## 2024-01-06 DIAGNOSIS — J44.9 CHRONIC OBSTRUCTIVE PULMONARY DISEASE, UNSPECIFIED COPD TYPE (HCC): ICD-10-CM

## 2024-01-06 DIAGNOSIS — E78.00 PURE HYPERCHOLESTEROLEMIA: ICD-10-CM

## 2024-01-06 DIAGNOSIS — E55.9 VITAMIN D DEFICIENCY: ICD-10-CM

## 2024-01-06 LAB
25(OH)D3 SERPL-MCNC: 46.4 NG/ML (ref 30–100)
ALBUMIN SERPL BCP-MCNC: 4.4 G/DL (ref 3.5–5)
ALP SERPL-CCNC: 91 U/L (ref 34–104)
ALT SERPL W P-5'-P-CCNC: 15 U/L (ref 7–52)
ANION GAP SERPL CALCULATED.3IONS-SCNC: 11 MMOL/L
AST SERPL W P-5'-P-CCNC: 20 U/L (ref 13–39)
BACTERIA UR QL AUTO: ABNORMAL /HPF
BILIRUB SERPL-MCNC: 1.72 MG/DL (ref 0.2–1)
BILIRUB UR QL STRIP: NEGATIVE
BUN SERPL-MCNC: 15 MG/DL (ref 5–25)
CALCIUM SERPL-MCNC: 10.2 MG/DL (ref 8.4–10.2)
CHLORIDE SERPL-SCNC: 101 MMOL/L (ref 96–108)
CHOLEST SERPL-MCNC: 156 MG/DL
CLARITY UR: CLEAR
CO2 SERPL-SCNC: 27 MMOL/L (ref 21–32)
COLOR UR: ABNORMAL
CREAT SERPL-MCNC: 1.02 MG/DL (ref 0.6–1.3)
ERYTHROCYTE [DISTWIDTH] IN BLOOD BY AUTOMATED COUNT: 15.5 % (ref 11.6–15.1)
GFR SERPL CREATININE-BSD FRML MDRD: 50 ML/MIN/1.73SQ M
GLUCOSE P FAST SERPL-MCNC: 99 MG/DL (ref 65–99)
GLUCOSE UR STRIP-MCNC: NEGATIVE MG/DL
HCT VFR BLD AUTO: 42.1 % (ref 34.8–46.1)
HDLC SERPL-MCNC: 68 MG/DL
HGB BLD-MCNC: 13.8 G/DL (ref 11.5–15.4)
HGB UR QL STRIP.AUTO: NEGATIVE
KETONES UR STRIP-MCNC: NEGATIVE MG/DL
LDLC SERPL CALC-MCNC: 66 MG/DL (ref 0–100)
LEUKOCYTE ESTERASE UR QL STRIP: ABNORMAL
MCH RBC QN AUTO: 34.2 PG (ref 26.8–34.3)
MCHC RBC AUTO-ENTMCNC: 32.8 G/DL (ref 31.4–37.4)
MCV RBC AUTO: 104 FL (ref 82–98)
MUCOUS THREADS UR QL AUTO: ABNORMAL
NITRITE UR QL STRIP: NEGATIVE
NON-SQ EPI CELLS URNS QL MICRO: ABNORMAL /HPF
PH UR STRIP.AUTO: 6 [PH]
PLATELET # BLD AUTO: 205 THOUSANDS/UL (ref 149–390)
PMV BLD AUTO: 10.1 FL (ref 8.9–12.7)
POTASSIUM SERPL-SCNC: 4.2 MMOL/L (ref 3.5–5.3)
PROT SERPL-MCNC: 7.5 G/DL (ref 6.4–8.4)
PROT UR STRIP-MCNC: NEGATIVE MG/DL
RBC # BLD AUTO: 4.04 MILLION/UL (ref 3.81–5.12)
RBC #/AREA URNS AUTO: ABNORMAL /HPF
SODIUM SERPL-SCNC: 139 MMOL/L (ref 135–147)
SP GR UR STRIP.AUTO: 1.01 (ref 1–1.03)
TRIGL SERPL-MCNC: 110 MG/DL
TSH SERPL DL<=0.05 MIU/L-ACNC: 3.4 UIU/ML (ref 0.45–4.5)
UROBILINOGEN UR STRIP-ACNC: <2 MG/DL
WBC # BLD AUTO: 8.09 THOUSAND/UL (ref 4.31–10.16)
WBC #/AREA URNS AUTO: ABNORMAL /HPF

## 2024-01-06 PROCEDURE — 81001 URINALYSIS AUTO W/SCOPE: CPT

## 2024-01-06 PROCEDURE — 84443 ASSAY THYROID STIM HORMONE: CPT

## 2024-01-06 PROCEDURE — 80053 COMPREHEN METABOLIC PANEL: CPT

## 2024-01-06 PROCEDURE — 36415 COLL VENOUS BLD VENIPUNCTURE: CPT

## 2024-01-06 PROCEDURE — 80061 LIPID PANEL: CPT

## 2024-01-06 PROCEDURE — 85027 COMPLETE CBC AUTOMATED: CPT

## 2024-01-06 PROCEDURE — 82306 VITAMIN D 25 HYDROXY: CPT

## 2024-01-08 ENCOUNTER — TELEPHONE (OUTPATIENT)
Dept: VASCULAR SURGERY | Facility: CLINIC | Age: 84
End: 2024-01-08

## 2024-01-08 NOTE — TELEPHONE ENCOUNTER
Received call from Carmella from radiology with significant findings on pt's CTA head and neck w wo contrast done on 12/28/2023. Chart reviewed. GABBY Perkins reviewed the scan already and noted pt has a f/u appt on 1/18/2024 with Dr. Crockett.

## 2024-01-10 ENCOUNTER — TELEPHONE (OUTPATIENT)
Dept: FAMILY MEDICINE CLINIC | Facility: CLINIC | Age: 84
End: 2024-01-10

## 2024-01-10 NOTE — TELEPHONE ENCOUNTER
Lmom to call office back , ok to tell pt results below     Results per Dr Trevizo -  ultrasound looks okay.

## 2024-01-11 NOTE — TELEPHONE ENCOUNTER
Patient called back to follow up on test results and is aware they were WNL. 6 month follow up with PCP scheduled for 6/26/24.

## 2024-02-15 ENCOUNTER — OFFICE VISIT (OUTPATIENT)
Dept: VASCULAR SURGERY | Facility: CLINIC | Age: 84
End: 2024-02-15
Payer: COMMERCIAL

## 2024-02-15 ENCOUNTER — TELEPHONE (OUTPATIENT)
Dept: VASCULAR SURGERY | Facility: CLINIC | Age: 84
End: 2024-02-15

## 2024-02-15 ENCOUNTER — PREP FOR PROCEDURE (OUTPATIENT)
Dept: VASCULAR SURGERY | Facility: CLINIC | Age: 84
End: 2024-02-15

## 2024-02-15 VITALS
BODY MASS INDEX: 28.7 KG/M2 | SYSTOLIC BLOOD PRESSURE: 154 MMHG | HEIGHT: 61 IN | WEIGHT: 152 LBS | HEART RATE: 79 BPM | DIASTOLIC BLOOD PRESSURE: 84 MMHG

## 2024-02-15 DIAGNOSIS — I65.22 CAROTID ARTERY STENOSIS WITHOUT CEREBRAL INFARCTION, LEFT: Primary | ICD-10-CM

## 2024-02-15 PROCEDURE — 99215 OFFICE O/P EST HI 40 MIN: CPT | Performed by: SURGERY

## 2024-02-15 RX ORDER — CEFAZOLIN SODIUM 1 G/50ML
1000 SOLUTION INTRAVENOUS ONCE
OUTPATIENT
Start: 2024-02-15 | End: 2024-02-15

## 2024-02-15 RX ORDER — CHLORHEXIDINE GLUCONATE ORAL RINSE 1.2 MG/ML
15 SOLUTION DENTAL ONCE
OUTPATIENT
Start: 2024-02-15 | End: 2024-02-15

## 2024-02-15 NOTE — PROGRESS NOTES
"Assessment/Plan:    Carotid artery stenosis without cerebral infarction, left  83-year-old female with high-grade left carotid artery stenosis, asymptomatic.  Smoking cessation discussed.  Medical compliance with aspirin statin and Plavix discussed.  Plan for left carotid endarterectomy, timing to be determined as the patient is assisting with her daughters metastatic cancer care.    Subjective:      Patient ID: Jackelin Song is a 83 y.o. female.    Patient presents to review CTA h/n s/p R CEA 9/28/22.    MARCELLO Benítez is a pleasant 83-year-old female who presents for evaluation of her severe high-grade left carotid artery stenosis which is asymptomatic.  She denies any focal weakness or numbness in either extremity or her face.  She denies temporary blindness.  She is unfortunately still smoking.  She blames the stress upon the ongoing difficulty with her daughters metastatic ovarian cancer.  The daughter had bilateral pleural effusions tapped yesterday and has a appointment with the medical oncologist in the coming week.  Unfortunately appears that her daughter's disease is worsening.  She would like to delay her surgery until after her daughter's birthday so that she can share that time with her.  We discussed surgical timing and I told her that though I am sympathetic to which she has been going through, she does have risk of stroke.    Review of Systems   Constitutional: Negative.    HENT: Negative.     Eyes: Negative.    Respiratory: Negative.     Cardiovascular: Negative.    Gastrointestinal: Negative.    Endocrine: Negative.    Genitourinary: Negative.    Musculoskeletal: Negative.    Skin: Negative.    Allergic/Immunologic: Negative.    Neurological: Negative.    Hematological: Negative.    Psychiatric/Behavioral: Negative.         Objective:  /84 (BP Location: Left arm, Patient Position: Sitting, Cuff Size: Standard)   Pulse 79   Ht 5' 1\" (1.549 m)   Wt 68.9 kg (152 lb)   BMI 28.72 kg/m²      " Physical Exam  Constitutional:       Appearance: Normal appearance.   HENT:      Head: Normocephalic and atraumatic.      Nose: No congestion or rhinorrhea.   Eyes:      Extraocular Movements: Extraocular movements intact.      Pupils: Pupils are equal, round, and reactive to light.   Cardiovascular:      Rate and Rhythm: Normal rate and regular rhythm.   Pulmonary:      Effort: Pulmonary effort is normal.      Breath sounds: No stridor.   Abdominal:      General: There is no distension.      Tenderness: There is no abdominal tenderness.   Musculoskeletal:         General: No swelling. Normal range of motion.      Cervical back: Normal range of motion and neck supple.   Skin:     General: Skin is warm.      Coloration: Skin is not jaundiced.   Neurological:      General: No focal deficit present.      Mental Status: She is alert and oriented to person, place, and time.   Psychiatric:         Mood and Affect: Mood normal.         Behavior: Behavior normal.

## 2024-02-15 NOTE — ASSESSMENT & PLAN NOTE
83-year-old female with high-grade left carotid artery stenosis, asymptomatic.  Smoking cessation discussed.  Medical compliance with aspirin statin and Plavix discussed.  Plan for left carotid endarterectomy, timing to be determined as the patient is assisting with her daughters metastatic cancer care.

## 2024-02-15 NOTE — PATIENT INSTRUCTIONS
1. Carotid artery stenosis without cerebral infarction, left  Assessment & Plan:  83-year-old female with high-grade left carotid artery stenosis, asymptomatic.  Smoking cessation discussed.  Medical compliance with aspirin statin and Plavix discussed.  Plan for left carotid endarterectomy, timing to be determined as the patient is assisting with her daughters metastatic cancer care.    Orders:  -     Case request operating room: ENDARTERECTOMY ARTERY CAROTID; Standing  -     Case request operating room: ENDARTERECTOMY ARTERY CAROTID

## 2024-02-15 NOTE — TELEPHONE ENCOUNTER
Verified patient's insurance   CONFIRMED - Patient's insurance is [facility]  Is patient requesting a call when authorization has been obtained? Patient did not request a call.    Surgery Date: 4/17/24  Primary Surgeon: CAP // Mango Crockett (NPI: [id number])  Assisting Surgeon: Not Applicable (N/A)  Facility: [facility] (Tax: [id number] / NPI: [id number])  Inpatient / Outpatient: Inpatient  Level: 3    Clearance Received: No clearance ordered.  Consent Received: Yes, scanned into Epic on 2/15/24.  Medication Hold / Last Dose:  No hold on ASA or Plavix  IR Notified: Not Applicable (N/A)  Rep. Notified:  Vas tech notified  Equipment Needs: Not Applicable (N/A)  [facility] Lab Requested: Not Applicable (N/A)  Patient Contacted: 2/15/24 during patient hours    Diagnosis: I65.22  Procedure/ CPT Code(s): (CEA) Carotid Endarterectomy / Patch Angioplasty // CPT: 75623    For varicose vein related procedures:   Last LEVDR: Not Applicable (N/A)  CEAP Classification: Not Applicable (N/A)  VCSS: Not Applicable (N/A)    Post Operative Date/ Time: TBD      *Please review medication hold(s), PATs, and check H&P with patient.*  PATIENT WAS MAILED SURGERY/SHOWERING/DISCHARGE/COVID INSTRUCTIONS AFTER REVIEWING WITH THEM VIA PHONE CALL.

## 2024-02-15 NOTE — LETTER
February 15, 2024     Ubaldo Trevizo DO  [address]  [facility] [address]    Patient: Jackelin Song   YOB: 1940   Date of Visit: 2/15/2024       Dear Dr. Trevizo:    Thank you for referring Jackelin Song to me for evaluation. Below are my notes for this consultation.    If you have questions, please do not hesitate to call me. I look forward to following your patient along with you.         Sincerely,        Mango Crockett MD        CC: Jackelin Song  MD Mango Aaron MD  2/15/2024  1:13 PM  Sign when Signing Visit  Assessment/Plan:    Carotid artery stenosis without cerebral infarction, left  83-year-old female with high-grade left carotid artery stenosis, asymptomatic.  Smoking cessation discussed.  Medical compliance with aspirin statin and Plavix discussed.  Plan for left carotid endarterectomy, timing to be determined as the patient is assisting with her daughters metastatic cancer care.    Subjective:      Patient ID: Jackelin Song is a 83 y.o. female.    Patient presents to review CTA h/n s/p R CEA 9/28/22.    MARCELLO Benítez is a pleasant 83-year-old female who presents for evaluation of her severe high-grade left carotid artery stenosis which is asymptomatic.  She denies any focal weakness or numbness in either extremity or her face.  She denies temporary blindness.  She is unfortunately still smoking.  She blames the stress upon the ongoing difficulty with her daughters metastatic ovarian cancer.  The daughter had bilateral pleural effusions tapped yesterday and has a appointment with the medical oncologist in the coming week.  Unfortunately appears that her daughter's disease is worsening.  She would like to delay her surgery until after her daughter's birthday so that she can share that time with her.  We discussed surgical timing and I told her that though I am sympathetic to which she has been going through, she does have risk  "of stroke.    Review of Systems   Constitutional: Negative.    HENT: Negative.     Eyes: Negative.    Respiratory: Negative.     Cardiovascular: Negative.    Gastrointestinal: Negative.    Endocrine: Negative.    Genitourinary: Negative.    Musculoskeletal: Negative.    Skin: Negative.    Allergic/Immunologic: Negative.    Neurological: Negative.    Hematological: Negative.    Psychiatric/Behavioral: Negative.         Objective:  /84 (BP Location: Left arm, Patient Position: Sitting, Cuff Size: Standard)   Pulse 79   Ht 5' 1\" (1.549 m)   Wt 68.9 kg (152 lb)   BMI 28.72 kg/m²      Physical Exam  Constitutional:       Appearance: Normal appearance.   HENT:      Head: Normocephalic and atraumatic.      Nose: No congestion or rhinorrhea.   Eyes:      Extraocular Movements: Extraocular movements intact.      Pupils: Pupils are equal, round, and reactive to light.   Cardiovascular:      Rate and Rhythm: Normal rate and regular rhythm.   Pulmonary:      Effort: Pulmonary effort is normal.      Breath sounds: No stridor.   Abdominal:      General: There is no distension.      Tenderness: There is no abdominal tenderness.   Musculoskeletal:         General: No swelling. Normal range of motion.      Cervical back: Normal range of motion and neck supple.   Skin:     General: Skin is warm.      Coloration: Skin is not jaundiced.   Neurological:      General: No focal deficit present.      Mental Status: She is alert and oriented to person, place, and time.   Psychiatric:         Mood and Affect: Mood normal.         Behavior: Behavior normal.       "

## 2024-02-24 ENCOUNTER — APPOINTMENT (OUTPATIENT)
Dept: LAB | Facility: IMAGING CENTER | Age: 84
End: 2024-02-24
Payer: COMMERCIAL

## 2024-02-24 DIAGNOSIS — I65.22 CAROTID ARTERY STENOSIS WITHOUT CEREBRAL INFARCTION, LEFT: ICD-10-CM

## 2024-02-24 DIAGNOSIS — Z01.812 PRE-PROCEDURAL LABORATORY EXAMINATION: ICD-10-CM

## 2024-02-24 LAB
ANION GAP SERPL CALCULATED.3IONS-SCNC: 7 MMOL/L
BUN SERPL-MCNC: 15 MG/DL (ref 5–25)
CALCIUM SERPL-MCNC: 9.7 MG/DL (ref 8.4–10.2)
CHLORIDE SERPL-SCNC: 106 MMOL/L (ref 96–108)
CO2 SERPL-SCNC: 29 MMOL/L (ref 21–32)
CREAT SERPL-MCNC: 1.1 MG/DL (ref 0.6–1.3)
ERYTHROCYTE [DISTWIDTH] IN BLOOD BY AUTOMATED COUNT: 15.1 % (ref 11.6–15.1)
GFR SERPL CREATININE-BSD FRML MDRD: 46 ML/MIN/1.73SQ M
GLUCOSE SERPL-MCNC: 97 MG/DL (ref 65–140)
HCT VFR BLD AUTO: 40.1 % (ref 34.8–46.1)
HGB BLD-MCNC: 13 G/DL (ref 11.5–15.4)
INR PPP: 1.01 (ref 0.84–1.19)
MCH RBC QN AUTO: 34.3 PG (ref 26.8–34.3)
MCHC RBC AUTO-ENTMCNC: 32.4 G/DL (ref 31.4–37.4)
MCV RBC AUTO: 106 FL (ref 82–98)
PLATELET # BLD AUTO: 214 THOUSANDS/UL (ref 149–390)
PMV BLD AUTO: 10.6 FL (ref 8.9–12.7)
POTASSIUM SERPL-SCNC: 4.7 MMOL/L (ref 3.5–5.3)
PROTHROMBIN TIME: 13.2 SECONDS (ref 11.6–14.5)
RBC # BLD AUTO: 3.79 MILLION/UL (ref 3.81–5.12)
SODIUM SERPL-SCNC: 142 MMOL/L (ref 135–147)
WBC # BLD AUTO: 8.01 THOUSAND/UL (ref 4.31–10.16)

## 2024-02-24 PROCEDURE — 86850 RBC ANTIBODY SCREEN: CPT | Performed by: SURGERY

## 2024-02-24 PROCEDURE — 80048 BASIC METABOLIC PNL TOTAL CA: CPT

## 2024-02-24 PROCEDURE — 86900 BLOOD TYPING SEROLOGIC ABO: CPT | Performed by: SURGERY

## 2024-02-24 PROCEDURE — 36415 COLL VENOUS BLD VENIPUNCTURE: CPT

## 2024-02-24 PROCEDURE — 85610 PROTHROMBIN TIME: CPT

## 2024-02-24 PROCEDURE — 86901 BLOOD TYPING SEROLOGIC RH(D): CPT | Performed by: SURGERY

## 2024-02-24 PROCEDURE — 85027 COMPLETE CBC AUTOMATED: CPT

## 2024-02-25 ENCOUNTER — LAB REQUISITION (OUTPATIENT)
Dept: LAB | Facility: HOSPITAL | Age: 84
End: 2024-02-25
Payer: COMMERCIAL

## 2024-02-25 DIAGNOSIS — Z01.812 ENCOUNTER FOR PREPROCEDURAL LABORATORY EXAMINATION: ICD-10-CM

## 2024-02-25 LAB
ABO GROUP BLD: NORMAL
BLD GP AB SCN SERPL QL: NEGATIVE
RH BLD: POSITIVE
SPECIMEN EXPIRATION DATE: NORMAL

## 2024-02-27 ENCOUNTER — ANESTHESIA EVENT (OUTPATIENT)
Dept: PERIOP | Facility: HOSPITAL | Age: 84
DRG: 039 | End: 2024-02-27
Payer: COMMERCIAL

## 2024-03-04 DIAGNOSIS — E78.5 HYPERLIPIDEMIA, UNSPECIFIED HYPERLIPIDEMIA TYPE: ICD-10-CM

## 2024-03-04 RX ORDER — SIMVASTATIN 40 MG
40 TABLET ORAL
Qty: 90 TABLET | Refills: 1 | Status: SHIPPED | OUTPATIENT
Start: 2024-03-04

## 2024-03-19 RX ORDER — VITAMIN B COMPLEX
1000 TABLET ORAL DAILY
COMMUNITY

## 2024-03-19 RX ORDER — CALCIUM CARBONATE 1000 MG/1
1000 TABLET, CHEWABLE ORAL 3 TIMES DAILY
COMMUNITY

## 2024-03-19 NOTE — PRE-PROCEDURE INSTRUCTIONS
Pre-Surgery Instructions:   Medication Instructions    Ascorbic Acid (vitamin C) 1000 MG tablet Instructed to hold    calcium carbonate (Calcium Antacid Ultra Max St) 1000 MG chewable tablet Instructed to hold    cholecalciferol (VITAMIN D3) 25 mcg (1,000 units) tablet Instructed to hold    clopidogrel (PLAVIX) 75 mg tablet Take day of surgery.    simvastatin (ZOCOR) 40 mg tablet Take day of surgery.    See Geriatric Assessment below...  Cognitive Assessment:   CAM:   TUG <15 sec:  Falls (last 6 months):   Hand  score:  -Attempt 1:  -Attempt 2:  -Attempt 3:  Lam Total Score: 20  PHQ- 9 Depression Scale:2  Nutrition Assessment Score:12  METS:5.7  Incentive Spirometry Level:   Health goals:  -What are your overall health goals? (quit smoking, wt. loss, rest, decrease stress)trying to quit smoking, eat healthy    -What brings you strength? (family, friends, [employer], health)family and the baby - when I can do things with my daughter    -What activities are important to you? (exercise, reading, travel, work)  Shopping, going to the [employer], going for walks    Medication instructions for day surgery reviewed. Please use only a sip of water to take your instructed medications. Avoid all over the counter vitamins, supplements and NSAIDS for one week prior to surgery per anesthesia guidelines. Tylenol is ok to take as needed.     You will receive a call one business day prior to surgery with an arrival time and hospital directions. If your surgery is scheduled on a Monday, the hospital will be calling you on the Friday prior to your surgery. If you have not heard from anyone by 8pm, please call the hospital supervisor through the hospital  at [phone]. ([place] [phone] or [place] [phone]).    Do not eat or drink anything after midnight the night before your surgery, including candy, mints, lifesavers, or chewing gum. Do not drink alcohol 24hrs before your surgery. Try not to smoke at least  24hrs before your surgery.       Follow the pre surgery showering instructions as listed in the “My Surgical Experience Booklet” or otherwise provided by your surgeon's office. Do not use a blade to shave the surgical area 1 week before surgery. It is okay to use a clean electric clippers up to 24 hours before surgery. Do not apply any lotions, creams, including makeup, cologne, deodorant, or perfumes after showering on the day of your surgery. Do not use dry shampoo, hair spray, hair gel, or any type of hair products.     No contact lenses, eye make-up, or artificial eyelashes. Remove nail polish, including gel polish, and any artificial, gel, or acrylic nails if possible. Remove all jewelry including rings and body piercing jewelry.     Wear causal clothing that is easy to take on and off. Consider your type of surgery.    Keep any valuables, jewelry, piercings at home. Please bring any specially ordered equipment (sling, braces) if indicated.    Arrange for a responsible person to drive you to and from the hospital on the day of your surgery. Please confirm the visitor policy for the day of your procedure when you receive your phone call with an arrival time.     Call the surgeon's office with any new illnesses, exposures, or additional questions prior to surgery.    Please reference your “My Surgical Experience Booklet” for additional information to prepare for your upcoming surgery.    Pt has surgical soap.

## 2024-03-26 ENCOUNTER — ANESTHESIA (OUTPATIENT)
Dept: PERIOP | Facility: HOSPITAL | Age: 84
DRG: 039 | End: 2024-03-26
Payer: COMMERCIAL

## 2024-03-26 ENCOUNTER — APPOINTMENT (OUTPATIENT)
Dept: NON INVASIVE DIAGNOSTICS | Facility: HOSPITAL | Age: 84
DRG: 039 | End: 2024-03-26
Payer: COMMERCIAL

## 2024-03-26 DIAGNOSIS — I70.211 ATHEROSCLEROSIS OF NATIVE ARTERY OF RIGHT LOWER EXTREMITY WITH INTERMITTENT CLAUDICATION (HCC): ICD-10-CM

## 2024-03-26 RX ORDER — CLOPIDOGREL BISULFATE 75 MG/1
75 TABLET ORAL DAILY
Qty: 90 TABLET | Refills: 1 | Status: SHIPPED | OUTPATIENT
Start: 2024-03-26

## 2024-03-28 ENCOUNTER — OFFICE VISIT (OUTPATIENT)
Dept: CARDIOLOGY CLINIC | Facility: CLINIC | Age: 84
End: 2024-03-28
Payer: COMMERCIAL

## 2024-03-28 VITALS
WEIGHT: 148 LBS | HEART RATE: 68 BPM | DIASTOLIC BLOOD PRESSURE: 72 MMHG | HEIGHT: 61 IN | BODY MASS INDEX: 27.94 KG/M2 | SYSTOLIC BLOOD PRESSURE: 148 MMHG

## 2024-03-28 DIAGNOSIS — N18.30 STAGE 3 CHRONIC KIDNEY DISEASE, UNSPECIFIED WHETHER STAGE 3A OR 3B CKD (HCC): ICD-10-CM

## 2024-03-28 DIAGNOSIS — Z01.810 PREOPERATIVE CARDIOVASCULAR EXAMINATION: Primary | ICD-10-CM

## 2024-03-28 DIAGNOSIS — I65.22 CAROTID ARTERY STENOSIS WITHOUT CEREBRAL INFARCTION, LEFT: ICD-10-CM

## 2024-03-28 PROCEDURE — 93000 ELECTROCARDIOGRAM COMPLETE: CPT | Performed by: INTERNAL MEDICINE

## 2024-03-28 PROCEDURE — 99204 OFFICE O/P NEW MOD 45 MIN: CPT | Performed by: INTERNAL MEDICINE

## 2024-03-28 NOTE — PROGRESS NOTES
Cardiology Preoperative Visit    Jackelin Song  1940  [id number]  [facility]  [address] [place] [id number] [phone]    1. Preoperative cardiovascular examination  POCT ECG      2. Carotid artery stenosis without cerebral infarction, left  POCT ECG      3. Stage 3 chronic kidney disease, unspecified whether stage 3a or 3b CKD (HCC)            Discussion/Summary:    Although she has significant peripheral arterial disease, she has no cardiac symptoms currently. She is on appropriate medical therapy with Plavix and her lipid panel is well suppressed with simvastatin.    She has whitecoat hypertension. She checks her blood pressure regularly at home and tells me is controlled.    She is reasonably active, though somewhat limited by claudication. When she goes up a flight of stairs or does her walking, she denies any chest pain or other new cardiac symptoms.    ECG without any significant abnormalities.    Overall, no contraindication to proceeding with a carotid endarterectomy. I have recommended following up with me in the future and we can determine if any cardiac testing would be necessary at that time. No cardiac testing is indicated prior to her upcoming vascular surgery.      History Of Present Illness:   Pleasant 84-year-old female who presents to the office today for the purpose of preoperative evaluation prior to her carotid endarterectomy.    She has significant peripheral arterial disease. She has a right CEA in 2022. She has had iliac artery stenosis. She describes symptoms of claudication.    She has had evaluation with a cardiologist prior to her surgery in 2022. At that time, she was asymptomatic and no testing was performed. She has not followed up with cardiologist since that time and was advised to follow-up as needed.    She is now referred because she needs left carotid endarterectomy. This  was found on imaging. She is asymptomatic never had a CVA before. This is going to be performed next week.    She is on Plavix and simvastatin. Blood work is followed by primary care physician. She had a lipid panel done earlier this year that showed an LDL less than 70.    Although she is limited by symptoms of claudication, she tells me that she walks about a block at a time needs to stop when she gets the symptoms in her legs but then continues on and has no cardiac symptoms at all. She says she is the primary caretaker for her daughter, who unfortunately, his cancer. She does the cooking, cleaning, housework, etc. She goes up and down stairs and denies any symptoms with this.    Specifically, she denies any chest pain, shortness of breath, dizziness, lightheadedness, presyncope, or syncope.    Medical Problems       Problem List       Smoker    Peripheral arterial occlusive disease (HCC)    Proximal humerus fracture    Overview Signed 12/9/2016  8:17 AM by Henri Buckley PA-C     right poximal         Atherosclerosis of native arteries of extremities with intermittent claudication, unspecified extremity (HCC)    Overview Addendum 5/14/2021  9:45 AM by Felicia Venegas     Transitioned From: Small vessel disease  Per ICD-10 Coding Guidelines and per Physician         COPD (chronic obstructive pulmonary disease) (HCC)    Overview Signed 5/14/2021  9:45 AM by Felicia Venegas     Per ICD-10 Coding Guidelines and per Physician         Degeneration of intervertebral disc    Hyperlipidemia    Iliac artery stenosis, bilateral (HCC)    Carotid artery stenosis without cerebral infarction, left    Osteoarthritis    Osteopenia    Varicose veins of right lower extremity with pain    Vitamin D deficiency    Age-related nuclear cataract of both eyes    Overview Signed 6/29/2020 10:37 AM by Ubaldo Trevizo DO     Last Assessment & Plan:   Monitor  Consider cataract surgery advised.         Open angle with borderline findings,  low risk, bilateral    Overview Signed 6/29/2020 10:37 AM by Ubaldo Trevizo, DO     No fh     Last Assessment & Plan:   Suspicious visual field  Defect left eye more than right eye   Repeat visual field    IOP good         Disorder of arteries and arterioles, unspecified (HCC)    Overview Signed 5/14/2021  9:42 AM by Felicia Venegas     Per ICD-10 Coding Guidelines and per Physician         Stricture of artery ([facility])    Overview Signed 5/14/2021  9:43 AM by Felicia Venegas     Per ICD-10 Coding Guidelines and per Physician         CKD (chronic kidney disease) stage 3, GFR 30-59 ml/min (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)    Preoperative cardiovascular examination    Severe dizziness    Vertigo        Past Medical History:   Diagnosis Date    Back pain     Carotid artery disease ([facility])     Colon polyp     DVT (deep venous thrombosis) ([facility])     Food allergy     chocholate    Hyperlipidemia     Neck pain     Peripheral arterial occlusive disease ([facility])     Right humeral fracture     right poximal    Smoking     Wears dentures     full upper, partial lower    Wears glasses      Social History     Tobacco Use    Smoking status: Every Day     Current packs/day: 0.50     Average packs/day: 0.5 packs/day for 64.0 years (32.0 ttl pk-yrs)     Types: Cigarettes    Smokeless tobacco: Never    Tobacco comments:     Trying to quit   Vaping Use    Vaping status: Former   Substance Use Topics    Alcohol use: Not Currently     Alcohol/week: 2.0 standard drinks of alcohol     Types: 2 Cans of beer per week     Comment: glass of beer twice a week    Drug use: No      Family History   Problem Relation Age of Onset    No Known Problems Mother     No Known Problems Father      Past Surgical History:  "  Procedure Laterality Date    APPENDECTOMY      BREAST SURGERY      gilberto    COLONOSCOPY      HEMORRHOID SURGERY      ILIAC ARTERY STENT      pt states she has many stents    IR AORTAGRAM WITH RUN-OFF  08/26/2019    NC ARTHROPLASTY GLENOHUMERAL JOINT TOTAL SHOULDER Right 12/08/2016    Procedure: ARTHROPLASTY SHOULDER REVERSE;  Surgeon: Harry Curry MD;  Location: [facility];  Service: Orthopedics    NC TEAEC W/PATCH GRF CAROTID VERTB SUBCLAV NECK INC Right 09/28/2022    Procedure: ENDARTERECTOMY ARTERY CAROTID w/bovine patch completion duplex imaging;  Surgeon: Hussein Smalls MD;  Location: [facility];  Service: Vascular    THROMBOENDARTERECTOMY      TONSILLECTOMY         Current Outpatient Medications:     clopidogrel (PLAVIX) 75 mg tablet, TAKE 1 TABLET BY MOUTH EVERY DAY, Disp: 90 tablet, Rfl: 1    simvastatin (ZOCOR) 40 mg tablet, TAKE 1 TABLET BY MOUTH EVERYDAY AT BEDTIME, Disp: 90 tablet, Rfl: 1    Ascorbic Acid (vitamin C) 1000 MG tablet, Take 1,000 mg by mouth daily (Patient not taking: Reported on 3/28/2024), Disp: , Rfl:     calcium carbonate (Calcium Antacid Ultra Max St) 1000 MG chewable tablet, Chew 1,000 mg 3 (three) times a day (Patient not taking: Reported on 3/28/2024), Disp: , Rfl:     cholecalciferol (VITAMIN D3) 25 mcg (1,000 units) tablet, Take 1,000 Units by mouth daily (Patient not taking: Reported on 3/28/2024), Disp: , Rfl:   Allergies   Allergen Reactions    Celecoxib GI Intolerance    Other Rash     Adhesive tape-skin tear    Cilostazol Edema     rash    Sutures  [Suture] Other (See Comments)     Doesn't tolerate dissolving sutures    Wound Dressing Adhesive      Other reaction(s): Itching       Vitals:    03/28/24 1253   BP: 148/72   BP Location: Right arm   Patient Position: Sitting   Cuff Size: Standard   Pulse: 68   Weight: 67.1 kg (148 lb)   Height: 5' 1\" (1.549 m)     Vitals:    03/28/24 1253   Weight: 67.1 kg (148 lb)      Height: 5' 1\" (154.9 cm)   Body mass index is 27.96 " kg/m².    Physical Exam:  GENERAL: Alert, well appearing, and in no distress  HEENT:  PERRL, EOMI, no scleral icterus, no conjunctival pallor  NECK:  Supple, No elevated JVP, no thyromegaly, no carotid bruits  HEART:  Regular rate and rhythm, normal S1/S2, no S3/S4, no murmur or rub  LUNGS:  Clear to auscultation bilaterally  ABDOMEN:  Soft, non-tender, positive bowel sounds, no rebound or guarding  EXTREMITIES:  No edema  VASCULAR:  Normal pedal pulses   NEURO: No focal deficits,  SKIN: Normal without suspicious lesions on exposed skin      ROS:  Positive for claudication.  Except as noted in HPI, is otherwise reviewed in detail and a 12 point review of systems is negative.    Labs:  Lab Results   Component Value Date    SODIUM 142 02/24/2024    K 4.7 02/24/2024     02/24/2024    CREATININE 1.10 02/24/2024    BUN 15 02/24/2024    CO2 29 02/24/2024    ALT 15 01/06/2024    AST 20 01/06/2024    INR 1.01 02/24/2024    GLUF 99 01/06/2024    WBC 8.01 02/24/2024    HGB 13.0 02/24/2024    HCT 40.1 02/24/2024     02/24/2024        Lab Results   Component Value Date    CHOL 173 09/24/2015    CHOL 168 09/04/2014    CHOL 170 02/25/2014     Lab Results   Component Value Date    HDL 68 01/06/2024    HDL 51 12/13/2022    HDL 84 12/03/2021     Lab Results   Component Value Date    LDLCALC 66 01/06/2024    LDLCALC 67 12/13/2022    LDLCALC 81 12/03/2021     Lab Results   Component Value Date    TRIG 110 01/06/2024    TRIG 121 12/13/2022    TRIG 52 12/03/2021       EKG:  Sinus rhythm, 68 beats per minute.

## 2024-04-03 ENCOUNTER — HOSPITAL ENCOUNTER (INPATIENT)
Facility: HOSPITAL | Age: 84
LOS: 1 days | Discharge: HOME | DRG: 039 | End: 2024-04-04
Attending: SURGERY | Admitting: SURGERY
Payer: COMMERCIAL

## 2024-04-03 ENCOUNTER — HOSPITAL ENCOUNTER (OUTPATIENT)
Dept: NON INVASIVE DIAGNOSTICS | Facility: HOSPITAL | Age: 84
Discharge: HOME | DRG: 039 | End: 2024-04-03
Payer: COMMERCIAL

## 2024-04-03 DIAGNOSIS — I65.22 STENOSIS OF LEFT INTERNAL CAROTID ARTERY: ICD-10-CM

## 2024-04-03 DIAGNOSIS — N18.30 STAGE 3 CHRONIC KIDNEY DISEASE, UNSPECIFIED WHETHER STAGE 3A OR 3B CKD (HCC): Primary | ICD-10-CM

## 2024-04-03 DIAGNOSIS — I65.22 CAROTID ARTERY STENOSIS WITHOUT CEREBRAL INFARCTION, LEFT: ICD-10-CM

## 2024-04-03 LAB
ABO GROUP BLD: NORMAL
ANION GAP SERPL CALCULATED.3IONS-SCNC: 6 MMOL/L (ref 4–13)
ATRIAL RATE: 44 BPM
ATRIAL RATE: 80 BPM
BASOPHILS # BLD AUTO: 0.03 THOUSANDS/ÂΜL (ref 0–0.1)
BASOPHILS NFR BLD AUTO: 0 % (ref 0–1)
BLD GP AB SCN SERPL QL: NEGATIVE
BUN SERPL-MCNC: 11 MG/DL (ref 5–25)
CA-I BLD-SCNC: 1.13 MMOL/L (ref 1.12–1.32)
CALCIUM SERPL-MCNC: 8.3 MG/DL (ref 8.4–10.2)
CHLORIDE SERPL-SCNC: 111 MMOL/L (ref 96–108)
CO2 SERPL-SCNC: 23 MMOL/L (ref 21–32)
CREAT SERPL-MCNC: 0.94 MG/DL (ref 0.6–1.3)
EOSINOPHIL # BLD AUTO: 0.02 THOUSAND/ÂΜL (ref 0–0.61)
EOSINOPHIL NFR BLD AUTO: 0 % (ref 0–6)
ERYTHROCYTE [DISTWIDTH] IN BLOOD BY AUTOMATED COUNT: 14.1 % (ref 11.6–15.1)
GFR SERPL CREATININE-BSD FRML MDRD: 55 ML/MIN/1.73SQ M
GLUCOSE SERPL-MCNC: 121 MG/DL (ref 65–140)
HCT VFR BLD AUTO: 35.5 % (ref 34.8–46.1)
HGB BLD-MCNC: 11.6 G/DL (ref 11.5–15.4)
IMM GRANULOCYTES # BLD AUTO: 0.03 THOUSAND/UL (ref 0–0.2)
IMM GRANULOCYTES NFR BLD AUTO: 0 % (ref 0–2)
KCT BLD-ACNC: 217 SEC (ref 89–137)
KCT BLD-ACNC: 258 SEC (ref 89–137)
LYMPHOCYTES # BLD AUTO: 1.19 THOUSANDS/ÂΜL (ref 0.6–4.47)
LYMPHOCYTES NFR BLD AUTO: 13 % (ref 14–44)
MAGNESIUM SERPL-MCNC: 1.8 MG/DL (ref 1.9–2.7)
MCH RBC QN AUTO: 33 PG (ref 26.8–34.3)
MCHC RBC AUTO-ENTMCNC: 32.7 G/DL (ref 31.4–37.4)
MCV RBC AUTO: 101 FL (ref 82–98)
MONOCYTES # BLD AUTO: 0.27 THOUSAND/ÂΜL (ref 0.17–1.22)
MONOCYTES NFR BLD AUTO: 3 % (ref 4–12)
NEUTROPHILS # BLD AUTO: 7.55 THOUSANDS/ÂΜL (ref 1.85–7.62)
NEUTS SEG NFR BLD AUTO: 84 % (ref 43–75)
NRBC BLD AUTO-RTO: 0 /100 WBCS
P AXIS: 57 DEGREES
PHOSPHATE SERPL-MCNC: 3.5 MG/DL (ref 2.3–4.1)
PLATELET # BLD AUTO: 215 THOUSANDS/UL (ref 149–390)
PMV BLD AUTO: 9.8 FL (ref 8.9–12.7)
POTASSIUM SERPL-SCNC: 3.5 MMOL/L (ref 3.5–5.3)
PR INTERVAL: 158 MS
PR INTERVAL: 161 MS
QRS AXIS: 16 DEGREES
QRS AXIS: 20 DEGREES
QRSD INTERVAL: 167 MS
QRSD INTERVAL: 75 MS
QT INTERVAL: 321 MS
QT INTERVAL: 483 MS
QTC INTERVAL: 371 MS
QTC INTERVAL: 414 MS
RBC # BLD AUTO: 3.52 MILLION/UL (ref 3.81–5.12)
RH BLD: POSITIVE
SODIUM SERPL-SCNC: 140 MMOL/L (ref 135–147)
SPECIMEN EXPIRATION DATE: NORMAL
SPECIMEN SOURCE: ABNORMAL
SPECIMEN SOURCE: ABNORMAL
T WAVE AXIS: 110 DEGREES
T WAVE AXIS: 54 DEGREES
VENTRICULAR RATE: 44 BPM
VENTRICULAR RATE: 80 BPM
WBC # BLD AUTO: 9.09 THOUSAND/UL (ref 4.31–10.16)

## 2024-04-03 PROCEDURE — NC001 PR NO CHARGE: Performed by: SURGERY

## 2024-04-03 PROCEDURE — 86901 BLOOD TYPING SEROLOGIC RH(D): CPT | Performed by: SURGERY

## 2024-04-03 PROCEDURE — 80048 BASIC METABOLIC PNL TOTAL CA: CPT

## 2024-04-03 PROCEDURE — 93882 EXTRACRANIAL UNI/LTD STUDY: CPT

## 2024-04-03 PROCEDURE — 85025 COMPLETE CBC W/AUTO DIFF WBC: CPT

## 2024-04-03 PROCEDURE — 82330 ASSAY OF CALCIUM: CPT

## 2024-04-03 PROCEDURE — 85347 COAGULATION TIME ACTIVATED: CPT

## 2024-04-03 PROCEDURE — 86850 RBC ANTIBODY SCREEN: CPT | Performed by: SURGERY

## 2024-04-03 PROCEDURE — 99222 1ST HOSP IP/OBS MODERATE 55: CPT | Performed by: INTERNAL MEDICINE

## 2024-04-03 PROCEDURE — 35301 RECHANNELING OF ARTERY: CPT | Performed by: SURGERY

## 2024-04-03 PROCEDURE — 93010 ELECTROCARDIOGRAM REPORT: CPT | Performed by: INTERNAL MEDICINE

## 2024-04-03 PROCEDURE — C1781 MESH (IMPLANTABLE): HCPCS | Performed by: SURGERY

## 2024-04-03 PROCEDURE — 86900 BLOOD TYPING SEROLOGIC ABO: CPT | Performed by: SURGERY

## 2024-04-03 PROCEDURE — 83735 ASSAY OF MAGNESIUM: CPT

## 2024-04-03 PROCEDURE — 03CJ0ZZ EXTIRPATION OF MATTER FROM LEFT COMMON CAROTID ARTERY, OPEN APPROACH: ICD-10-PCS | Performed by: SURGERY

## 2024-04-03 PROCEDURE — 03UN0KZ SUPPLEMENT LEFT EXTERNAL CAROTID ARTERY WITH NONAUTOLOGOUS TISSUE SUBSTITUTE, OPEN APPROACH: ICD-10-PCS | Performed by: SURGERY

## 2024-04-03 PROCEDURE — 93005 ELECTROCARDIOGRAM TRACING: CPT

## 2024-04-03 PROCEDURE — 03CN0ZZ EXTIRPATION OF MATTER FROM LEFT EXTERNAL CAROTID ARTERY, OPEN APPROACH: ICD-10-PCS | Performed by: SURGERY

## 2024-04-03 PROCEDURE — 84100 ASSAY OF PHOSPHORUS: CPT

## 2024-04-03 PROCEDURE — 03UJ0KZ SUPPLEMENT LEFT COMMON CAROTID ARTERY WITH NONAUTOLOGOUS TISSUE SUBSTITUTE, OPEN APPROACH: ICD-10-PCS | Performed by: SURGERY

## 2024-04-03 DEVICE — XENOSURE BIOLOGIC PATCH, 0.8CM X 8CM, EIFU
Type: IMPLANTABLE DEVICE | Site: CAROTID | Status: FUNCTIONAL
Brand: XENOSURE BIOLOGIC PATCH

## 2024-04-03 RX ORDER — CLOPIDOGREL BISULFATE 75 MG/1
75 TABLET ORAL DAILY
Status: DISCONTINUED | OUTPATIENT
Start: 2024-04-04 | End: 2024-04-04 | Stop reason: HOSPADM

## 2024-04-03 RX ORDER — PRAVASTATIN SODIUM 40 MG
40 TABLET ORAL
Status: DISCONTINUED | OUTPATIENT
Start: 2024-04-03 | End: 2024-04-04 | Stop reason: HOSPADM

## 2024-04-03 RX ORDER — SODIUM CHLORIDE 9 MG/ML
INJECTION, SOLUTION INTRAVENOUS CONTINUOUS PRN
Status: DISCONTINUED | OUTPATIENT
Start: 2024-04-03 | End: 2024-04-03

## 2024-04-03 RX ORDER — FENTANYL CITRATE/PF 50 MCG/ML
25 SYRINGE (ML) INJECTION
Status: DISCONTINUED | OUTPATIENT
Start: 2024-04-03 | End: 2024-04-03 | Stop reason: HOSPADM

## 2024-04-03 RX ORDER — PROTAMINE SULFATE 10 MG/ML
INJECTION, SOLUTION INTRAVENOUS AS NEEDED
Status: DISCONTINUED | OUTPATIENT
Start: 2024-04-03 | End: 2024-04-03

## 2024-04-03 RX ORDER — CEFAZOLIN SODIUM 1 G/50ML
1000 SOLUTION INTRAVENOUS ONCE
Status: COMPLETED | OUTPATIENT
Start: 2024-04-03 | End: 2024-04-03

## 2024-04-03 RX ORDER — LABETALOL HYDROCHLORIDE 5 MG/ML
5 INJECTION, SOLUTION INTRAVENOUS
Status: DISCONTINUED | OUTPATIENT
Start: 2024-04-03 | End: 2024-04-04 | Stop reason: HOSPADM

## 2024-04-03 RX ORDER — CHLORHEXIDINE GLUCONATE ORAL RINSE 1.2 MG/ML
15 SOLUTION DENTAL ONCE
Status: COMPLETED | OUTPATIENT
Start: 2024-04-03 | End: 2024-04-03

## 2024-04-03 RX ORDER — SODIUM CHLORIDE 9 MG/ML
75 INJECTION, SOLUTION INTRAVENOUS CONTINUOUS
Status: DISPENSED | OUTPATIENT
Start: 2024-04-03 | End: 2024-04-04

## 2024-04-03 RX ORDER — MAGNESIUM HYDROXIDE 1200 MG/15ML
LIQUID ORAL AS NEEDED
Status: DISCONTINUED | OUTPATIENT
Start: 2024-04-03 | End: 2024-04-03 | Stop reason: HOSPADM

## 2024-04-03 RX ORDER — LABETALOL HYDROCHLORIDE 5 MG/ML
INJECTION, SOLUTION INTRAVENOUS AS NEEDED
Status: DISCONTINUED | OUTPATIENT
Start: 2024-04-03 | End: 2024-04-03

## 2024-04-03 RX ORDER — LANOLIN ALCOHOL/MO/W.PET/CERES
6 CREAM (GRAM) TOPICAL
Status: DISCONTINUED | OUTPATIENT
Start: 2024-04-03 | End: 2024-04-04 | Stop reason: HOSPADM

## 2024-04-03 RX ORDER — HYDRALAZINE HYDROCHLORIDE 20 MG/ML
15 INJECTION INTRAMUSCULAR; INTRAVENOUS
Status: DISCONTINUED | OUTPATIENT
Start: 2024-04-03 | End: 2024-04-04 | Stop reason: HOSPADM

## 2024-04-03 RX ORDER — MAGNESIUM SULFATE HEPTAHYDRATE 40 MG/ML
2 INJECTION, SOLUTION INTRAVENOUS ONCE
Status: COMPLETED | OUTPATIENT
Start: 2024-04-03 | End: 2024-04-03

## 2024-04-03 RX ORDER — POTASSIUM CHLORIDE 14.9 MG/ML
20 INJECTION INTRAVENOUS ONCE
Status: COMPLETED | OUTPATIENT
Start: 2024-04-03 | End: 2024-04-04

## 2024-04-03 RX ORDER — CALCIUM CARBONATE 500 MG/1
500 TABLET, CHEWABLE ORAL DAILY PRN
Status: DISCONTINUED | OUTPATIENT
Start: 2024-04-03 | End: 2024-04-04 | Stop reason: HOSPADM

## 2024-04-03 RX ORDER — ONDANSETRON 2 MG/ML
4 INJECTION INTRAMUSCULAR; INTRAVENOUS ONCE AS NEEDED
Status: DISCONTINUED | OUTPATIENT
Start: 2024-04-03 | End: 2024-04-03 | Stop reason: HOSPADM

## 2024-04-03 RX ORDER — LIDOCAINE HYDROCHLORIDE 10 MG/ML
INJECTION, SOLUTION EPIDURAL; INFILTRATION; INTRACAUDAL; PERINEURAL AS NEEDED
Status: DISCONTINUED | OUTPATIENT
Start: 2024-04-03 | End: 2024-04-03 | Stop reason: HOSPADM

## 2024-04-03 RX ORDER — SODIUM CHLORIDE, SODIUM LACTATE, POTASSIUM CHLORIDE, CALCIUM CHLORIDE 600; 310; 30; 20 MG/100ML; MG/100ML; MG/100ML; MG/100ML
125 INJECTION, SOLUTION INTRAVENOUS CONTINUOUS
Status: DISCONTINUED | OUTPATIENT
Start: 2024-04-03 | End: 2024-04-03

## 2024-04-03 RX ORDER — DEXAMETHASONE SODIUM PHOSPHATE 10 MG/ML
INJECTION, SOLUTION INTRAMUSCULAR; INTRAVENOUS AS NEEDED
Status: DISCONTINUED | OUTPATIENT
Start: 2024-04-03 | End: 2024-04-03

## 2024-04-03 RX ORDER — EPHEDRINE SULFATE 50 MG/ML
INJECTION INTRAVENOUS AS NEEDED
Status: DISCONTINUED | OUTPATIENT
Start: 2024-04-03 | End: 2024-04-03

## 2024-04-03 RX ORDER — CHLORHEXIDINE GLUCONATE ORAL RINSE 1.2 MG/ML
15 SOLUTION DENTAL EVERY 12 HOURS SCHEDULED
Status: DISCONTINUED | OUTPATIENT
Start: 2024-04-03 | End: 2024-04-04 | Stop reason: HOSPADM

## 2024-04-03 RX ORDER — ALBUTEROL SULFATE 2.5 MG/3ML
2.5 SOLUTION RESPIRATORY (INHALATION) ONCE AS NEEDED
Status: DISCONTINUED | OUTPATIENT
Start: 2024-04-03 | End: 2024-04-03 | Stop reason: HOSPADM

## 2024-04-03 RX ORDER — ONDANSETRON 2 MG/ML
INJECTION INTRAMUSCULAR; INTRAVENOUS AS NEEDED
Status: DISCONTINUED | OUTPATIENT
Start: 2024-04-03 | End: 2024-04-03

## 2024-04-03 RX ORDER — OXYCODONE HYDROCHLORIDE 5 MG/1
5 TABLET ORAL EVERY 4 HOURS PRN
Status: DISCONTINUED | OUTPATIENT
Start: 2024-04-03 | End: 2024-04-04 | Stop reason: HOSPADM

## 2024-04-03 RX ORDER — HEPARIN SODIUM 5000 [USP'U]/ML
5000 INJECTION, SOLUTION INTRAVENOUS; SUBCUTANEOUS EVERY 8 HOURS SCHEDULED
Status: DISCONTINUED | OUTPATIENT
Start: 2024-04-03 | End: 2024-04-04 | Stop reason: HOSPADM

## 2024-04-03 RX ORDER — POTASSIUM CHLORIDE 20 MEQ/1
40 TABLET, EXTENDED RELEASE ORAL ONCE
Status: COMPLETED | OUTPATIENT
Start: 2024-04-03 | End: 2024-04-03

## 2024-04-03 RX ORDER — LIDOCAINE HYDROCHLORIDE 20 MG/ML
INJECTION, SOLUTION EPIDURAL; INFILTRATION; INTRACAUDAL; PERINEURAL AS NEEDED
Status: DISCONTINUED | OUTPATIENT
Start: 2024-04-03 | End: 2024-04-03

## 2024-04-03 RX ORDER — PROPOFOL 10 MG/ML
INJECTION, EMULSION INTRAVENOUS CONTINUOUS PRN
Status: DISCONTINUED | OUTPATIENT
Start: 2024-04-03 | End: 2024-04-03

## 2024-04-03 RX ORDER — PROPOFOL 10 MG/ML
INJECTION, EMULSION INTRAVENOUS AS NEEDED
Status: DISCONTINUED | OUTPATIENT
Start: 2024-04-03 | End: 2024-04-03

## 2024-04-03 RX ORDER — FENTANYL CITRATE 50 UG/ML
INJECTION, SOLUTION INTRAMUSCULAR; INTRAVENOUS AS NEEDED
Status: DISCONTINUED | OUTPATIENT
Start: 2024-04-03 | End: 2024-04-03

## 2024-04-03 RX ORDER — ROCURONIUM BROMIDE 10 MG/ML
INJECTION, SOLUTION INTRAVENOUS AS NEEDED
Status: DISCONTINUED | OUTPATIENT
Start: 2024-04-03 | End: 2024-04-03

## 2024-04-03 RX ORDER — HEPARIN SODIUM 1000 [USP'U]/ML
INJECTION, SOLUTION INTRAVENOUS; SUBCUTANEOUS AS NEEDED
Status: DISCONTINUED | OUTPATIENT
Start: 2024-04-03 | End: 2024-04-03

## 2024-04-03 RX ORDER — ACETAMINOPHEN 325 MG/1
975 TABLET ORAL EVERY 6 HOURS SCHEDULED
Status: DISCONTINUED | OUTPATIENT
Start: 2024-04-03 | End: 2024-04-04 | Stop reason: HOSPADM

## 2024-04-03 RX ORDER — CEFAZOLIN SODIUM 1 G/50ML
1000 SOLUTION INTRAVENOUS EVERY 8 HOURS
Qty: 100 ML | Refills: 0 | Status: COMPLETED | OUTPATIENT
Start: 2024-04-03 | End: 2024-04-04

## 2024-04-03 RX ADMIN — SODIUM CHLORIDE: 0.9 INJECTION, SOLUTION INTRAVENOUS at 12:33

## 2024-04-03 RX ADMIN — PROPOFOL 60 MG: 10 INJECTION, EMULSION INTRAVENOUS at 13:11

## 2024-04-03 RX ADMIN — LABETALOL HYDROCHLORIDE 5 MG: 5 INJECTION, SOLUTION INTRAVENOUS at 14:13

## 2024-04-03 RX ADMIN — FENTANYL CITRATE 25 MCG: 50 INJECTION INTRAMUSCULAR; INTRAVENOUS at 15:40

## 2024-04-03 RX ADMIN — ACETAMINOPHEN 325MG 975 MG: 325 TABLET ORAL at 23:02

## 2024-04-03 RX ADMIN — HEPARIN SODIUM 6000 UNITS: 1000 INJECTION INTRAVENOUS; SUBCUTANEOUS at 13:18

## 2024-04-03 RX ADMIN — HEPARIN SODIUM 3000 UNITS: 1000 INJECTION INTRAVENOUS; SUBCUTANEOUS at 13:55

## 2024-04-03 RX ADMIN — ROCURONIUM BROMIDE 50 MG: 10 INJECTION, SOLUTION INTRAVENOUS at 12:18

## 2024-04-03 RX ADMIN — FENTANYL CITRATE 50 MCG: 50 INJECTION INTRAMUSCULAR; INTRAVENOUS at 12:18

## 2024-04-03 RX ADMIN — FENTANYL CITRATE 25 MCG: 50 INJECTION INTRAMUSCULAR; INTRAVENOUS at 14:12

## 2024-04-03 RX ADMIN — SODIUM CHLORIDE, SODIUM LACTATE, POTASSIUM CHLORIDE, AND CALCIUM CHLORIDE: .6; .31; .03; .02 INJECTION, SOLUTION INTRAVENOUS at 13:57

## 2024-04-03 RX ADMIN — DEXAMETHASONE SODIUM PHOSPHATE 5 MG: 10 INJECTION INTRAMUSCULAR; INTRAVENOUS at 13:09

## 2024-04-03 RX ADMIN — CALCIUM CARBONATE (ANTACID) CHEW TAB 500 MG 500 MG: 500 CHEW TAB at 21:36

## 2024-04-03 RX ADMIN — SODIUM CHLORIDE 75 ML/HR: 0.9 INJECTION, SOLUTION INTRAVENOUS at 17:05

## 2024-04-03 RX ADMIN — POTASSIUM CHLORIDE 40 MEQ: 1500 TABLET, EXTENDED RELEASE ORAL at 19:40

## 2024-04-03 RX ADMIN — FENTANYL CITRATE 25 MCG: 50 INJECTION INTRAMUSCULAR; INTRAVENOUS at 13:43

## 2024-04-03 RX ADMIN — PROPOFOL 40 MCG/KG/MIN: 10 INJECTION, EMULSION INTRAVENOUS at 12:23

## 2024-04-03 RX ADMIN — CEFAZOLIN SODIUM 1000 MG: 1 SOLUTION INTRAVENOUS at 21:12

## 2024-04-03 RX ADMIN — HEPARIN SODIUM 5000 UNITS: 5000 INJECTION INTRAVENOUS; SUBCUTANEOUS at 17:24

## 2024-04-03 RX ADMIN — ONDANSETRON 4 MG: 2 INJECTION INTRAMUSCULAR; INTRAVENOUS at 14:34

## 2024-04-03 RX ADMIN — SODIUM CHLORIDE, SODIUM LACTATE, POTASSIUM CHLORIDE, AND CALCIUM CHLORIDE 125 ML/HR: .6; .31; .03; .02 INJECTION, SOLUTION INTRAVENOUS at 09:53

## 2024-04-03 RX ADMIN — PHENYLEPHRINE HYDROCHLORIDE 20 MCG/MIN: 10 INJECTION INTRAVENOUS at 13:55

## 2024-04-03 RX ADMIN — PROPOFOL 140 MG: 10 INJECTION, EMULSION INTRAVENOUS at 12:18

## 2024-04-03 RX ADMIN — SODIUM CHLORIDE 500 ML: 0.9 INJECTION, SOLUTION INTRAVENOUS at 18:30

## 2024-04-03 RX ADMIN — ACETAMINOPHEN 325MG 975 MG: 325 TABLET ORAL at 17:24

## 2024-04-03 RX ADMIN — FENTANYL CITRATE 50 MCG: 50 INJECTION INTRAMUSCULAR; INTRAVENOUS at 13:31

## 2024-04-03 RX ADMIN — Medication 6 MG: at 21:12

## 2024-04-03 RX ADMIN — ROCURONIUM BROMIDE 20 MG: 10 INJECTION, SOLUTION INTRAVENOUS at 13:30

## 2024-04-03 RX ADMIN — CHLORHEXIDINE GLUCONATE 15 ML: 1.2 RINSE ORAL at 09:53

## 2024-04-03 RX ADMIN — LIDOCAINE HYDROCHLORIDE 100 MG: 20 INJECTION, SOLUTION EPIDURAL; INFILTRATION; INTRACAUDAL at 12:18

## 2024-04-03 RX ADMIN — EPHEDRINE SULFATE 5 MG: 50 INJECTION, SOLUTION INTRAVENOUS at 12:23

## 2024-04-03 RX ADMIN — PROTAMINE SULFATE 50 MG: 10 INJECTION, SOLUTION INTRAVENOUS at 14:17

## 2024-04-03 RX ADMIN — MAGNESIUM SULFATE HEPTAHYDRATE 2 G: 40 INJECTION, SOLUTION INTRAVENOUS at 19:40

## 2024-04-03 RX ADMIN — POTASSIUM CHLORIDE 20 MEQ: 14.9 INJECTION, SOLUTION INTRAVENOUS at 19:40

## 2024-04-03 RX ADMIN — PRAVASTATIN SODIUM 40 MG: 40 TABLET ORAL at 17:24

## 2024-04-03 RX ADMIN — CEFAZOLIN SODIUM 1000 MG: 1 SOLUTION INTRAVENOUS at 12:05

## 2024-04-03 RX ADMIN — FENTANYL CITRATE 50 MCG: 50 INJECTION INTRAMUSCULAR; INTRAVENOUS at 13:08

## 2024-04-03 RX ADMIN — HEPARIN SODIUM 5000 UNITS: 5000 INJECTION INTRAVENOUS; SUBCUTANEOUS at 21:12

## 2024-04-03 RX ADMIN — SODIUM CHLORIDE, SODIUM LACTATE, POTASSIUM CHLORIDE, AND CALCIUM CHLORIDE 500 ML: .6; .31; .03; .02 INJECTION, SOLUTION INTRAVENOUS at 19:54

## 2024-04-03 RX ADMIN — PHENYLEPHRINE HYDROCHLORIDE 5 MCG/MIN: 10 INJECTION INTRAVENOUS at 22:18

## 2024-04-03 RX ADMIN — CHLORHEXIDINE GLUCONATE 15 ML: 1.2 RINSE ORAL at 21:13

## 2024-04-03 RX ADMIN — SUGAMMADEX 200 MG: 100 INJECTION, SOLUTION INTRAVENOUS at 14:48

## 2024-04-03 NOTE — ASSESSMENT & PLAN NOTE
Clinically stable, not on any inhalers or O2 outpatient.  Tobacco cessation encouraged.  Currently on room air    Plan:  O2 supplementation as needed.

## 2024-04-03 NOTE — CONSULTS
On license of [facility]  Consult  Name: Jackelin Song 84 y.o. female I MRN: [id number]  Unit/Bed#: ICU 07 I Date of Admission: 4/3/2024   Date of Service: 4/3/2024 I Hospital Day: 0    Inpatient consult to Medical Critical Care  Consult performed by: Sukumar Guzman MD  Consult ordered by: Sae Marie, DO        Assessment/Plan   Carotid artery stenosis without cerebral infarction, left  Assessment & Plan  -High grade left carotid artery stenosis (70-99%). No TIA or CVA history.  -Has been on medical management with Aspirin, Plavix and simvastatin.  - S/P Carotid enterectomy with bovine patch angioplasty 4/3/24.  -Estimated blood loss documented was 150 cc's    Plan:  Resume Plavix on post op day 1  Continue statin  Hemodynamic monitoring with BP goal systolic of 100-160.  Monitor for any progression of swelling around the surgical site.      CKD (chronic kidney disease) stage 3, GFR 30-59 ml/min ([facility])  Assessment & Plan  Lab Results   Component Value Date    EGFR 46 02/24/2024    EGFR 50 01/06/2024    EGFR 57 11/27/2023    CREATININE 1.10 02/24/2024    CREATININE 1.02 01/06/2024    CREATININE 0.93 11/27/2023     Baseline creatinine 0.9-1    Plan:  Monitor renal indices   Avoid hypotension and nephrotoxic agents.      COPD (chronic obstructive pulmonary disease) ([facility])  Assessment & Plan  Clinically stable, not on any inhalers or O2 outpatient.  Tobacco cessation encouraged.  Currently on room air    Plan:  O2 supplementation as needed.      Smoker  Assessment & Plan  Is cutting down on smoking, is smoking 2-3 cigarettes a day currently.    Plan:  Nicotine declined.  Tobacco cessation counseling provided.    Hyperlipidemia  Assessment & Plan  On Simvastatin outpatient    Plan:  Pravastatin while inpatient as simvastatin is not in formulary.           History of Present Illness     HPI: Jackelin Song is a 84 y.o. female, current smoker 2-3 cigarettes a day with PMH of HTN, HLD, COPD, PAD,  aortoiliac occlusive disease, bilateral carotid artery stenosis s/P right carotis enterectomy in 9/22. Patient presented to the hospital for left carotid enterectomy with bovine patch angioplasty for asymptomatic carotid stenosis. Patient is brought to the ICU for hemodynamic and airway monitoring.     History obtained from chart review and the patient.  Review of Systems   Constitutional:  Negative for activity change, appetite change, chills and fever.   HENT:  Positive for sore throat (post extubation).    Respiratory:  Negative for chest tightness and shortness of breath.    Cardiovascular:  Negative for chest pain and leg swelling.   Gastrointestinal:  Negative for abdominal pain, nausea and vomiting.   Genitourinary:  Negative for dysuria.   Neurological:  Negative for tremors and headaches.   Psychiatric/Behavioral:  Negative for confusion.        Disposition: Critical care   Historical Information   Past Medical History:  No date: Back pain  No date: Carotid artery disease ([facility])  No date: Colon polyp  No date: DVT (deep venous thrombosis) ([facility])  No date: Food allergy      Comment:  chocholate  No date: Hyperlipidemia  No date: Neck pain  No date: Peripheral arterial occlusive disease (HCC)  No date: Right humeral fracture      Comment:  right poximal  No date: Smoking  No date: Wears dentures      Comment:  full upper, partial lower  No date: Wears glasses Past Surgical History:  No date: APPENDECTOMY  No date: BREAST SURGERY      Comment:  gilberto  No date: COLONOSCOPY  No date: HEMORRHOID SURGERY  No date: ILIAC ARTERY STENT      Comment:  pt states she has many stents  08/26/2019: IR AORTAGRAM WITH RUN-OFF  12/08/2016: UT ARTHROPLASTY GLENOHUMERAL JOINT TOTAL SHOULDER; Right      Comment:  Procedure: ARTHROPLASTY SHOULDER REVERSE;  Surgeon:                Harry Curry MD;  Location: [facility];  Service:                Orthopedics  09/28/2022: UT TEAEC W/PATCH GRF CAROTID VERTB SUBCLAV NECK INC; Right       Comment:  Procedure: ENDARTERECTOMY ARTERY CAROTID w/bovine patch                completion duplex imaging;  Surgeon: Hussein Smalls MD;  Location: [facility];  Service: Vascular  No date: THROMBOENDARTERECTOMY  No date: TONSILLECTOMY   Current Outpatient Medications   Medication Instructions    Calcium Antacid Ultra Max St 1,000 mg, 3 times daily    cholecalciferol (VITAMIN D3) 1,000 Units, Daily    clopidogrel (PLAVIX) 75 mg, Oral, Daily    simvastatin (ZOCOR) 40 mg, Oral, Daily at bedtime, <BR>    vitamin C 1,000 mg, Daily    Allergies   Allergen Reactions    Celecoxib GI Intolerance    Other Rash     Adhesive tape-skin tear    Cilostazol Edema     rash    Sutures  [Suture] Other (See Comments)     Doesn't tolerate dissolving sutures    Wound Dressing Adhesive      Other reaction(s): Itching      Social History     Tobacco Use    Smoking status: Every Day     Current packs/day: 0.50     Average packs/day: 0.5 packs/day for 64.0 years (32.0 ttl pk-yrs)     Types: Cigarettes    Smokeless tobacco: Never    Tobacco comments:     Trying to quit  last smoked 4.3.24 0700   Vaping Use    Vaping status: Former   Substance Use Topics    Alcohol use: Not Currently     Comment: occassionly    Drug use: No    Family History   Problem Relation Age of Onset    No Known Problems Mother     No Known Problems Father         Objective                            Vitals I/O      Most Recent Min/Max in 24hrs   Temp 97.7 °F (36.5 °C) Temp  Min: 97 °F (36.1 °C)  Max: 97.8 °F (36.6 °C)   Pulse 63 Pulse  Min: 52  Max: 64   Resp 17 Resp  Min: 12  Max: 27   BP (!) 115/38 BP  Min: 97/55  Max: 172/80   O2 Sat 93 % SpO2  Min: 93 %  Max: 98 %      Intake/Output Summary (Last 24 hours) at 4/3/2024 1653  Last data filed at 4/3/2024 1442  Gross per 24 hour   Intake 1700 ml   Output 150 ml   Net 1550 ml       Diet Regular; Regular House    Invasive Monitoring   Arterial Line  Brenda /40  Arterial Line BP  Min: 122/40  Max:  133/51   MAP (!) 62 mmHg  Arterial Line MAP (mmHg)  Min: 62 mmHg  Max: 74 mmHg           Physical Exam   Physical Exam  Eyes:      General: No scleral icterus.     Extraocular Movements: Extraocular movements intact.      Conjunctiva/sclera: Conjunctivae normal.   Skin:     General: Skin is warm.   HENT:      Head: Normocephalic and atraumatic.      Mouth/Throat:      Mouth: Mucous membranes are dry.   Neck:      Comments: Left carotid surgical site intact, bruising and mild swelling noted. Patient is able to speak in full sentences.  Cardiovascular:      Rate and Rhythm: Bradycardia present. Rhythm irregular.   Abdominal: General: Bowel sounds are normal. There is no distension.      Palpations: Abdomen is soft.      Tenderness: There is no abdominal tenderness.   Constitutional:       General: She is not in acute distress.     Appearance: She is well-developed. She is not ill-appearing or toxic-appearing.   Pulmonary:      Effort: Pulmonary effort is normal. No respiratory distress.      Breath sounds: Normal breath sounds. No stridor. No wheezing.   Neurological:      Mental Status: She is alert and oriented to person, place and time. Mental status is at baseline.            Diagnostic Studies      EKG: No current EKG on file for this admission.  Imaging: Outpatient US and CTA head and neck I have personally reviewed pertinent reports.       Medications:  Scheduled PRN   acetaminophen, 975 mg, Q6H THOMAS  cefazolin, 1,000 mg, Q8H  chlorhexidine, 15 mL, Q12H THOMAS  [START ON 4/4/2024] clopidogrel, 75 mg, Daily  heparin (porcine), 5,000 Units, Q8H THOMAS  pravastatin, 40 mg, Daily With Dinner      labetalol, 5 mg, Q15 Min PRN   And  hydrALAZINE, 15 mg, Q15 Min PRN   And  niCARdipine, 1-15 mg/hr, Continuous PRN  oxyCODONE, 5 mg, Q4H PRN  sodium chloride, 500 mL, Once PRN   Followed by  [START ON 4/6/2024] phenylephine,  mcg/min, Continuous PRN       Continuous    lactated ringers, 125 mL/hr, Last Rate: 125 mL/hr  (04/03/24 1530)  niCARdipine, 1-15 mg/hr  [START ON 4/6/2024] phenylephine,  mcg/min  sodium chloride, 75 mL/hr         Labs:    CBC    No recent results  BMP    No recent results    Coags    No recent results     Additional Electrolytes  No recent results       Blood Gas    No recent results  No recent results LFTs  No recent results    Infectious  No recent results  Glucose  No recent results            Sukumar Guzman MD

## 2024-04-03 NOTE — PLAN OF CARE
Problem: Prexisting or High Potential for Compromised Skin Integrity  Goal: Skin integrity is maintained or improved  Description: INTERVENTIONS:  - Identify patients at risk for skin breakdown  - Assess and monitor skin integrity  - Assess and monitor nutrition and hydration status  - Monitor labs   - Assess for incontinence   - Turn and reposition patient  - Assist with mobility/ambulation  - Relieve pressure over bony prominences  - Avoid friction and shearing  - Provide appropriate hygiene as needed including keeping skin clean and dry  - Evaluate need for skin moisturizer/barrier cream  - Collaborate with interdisciplinary team   - Patient/family teaching  - Consider wound care consult   Outcome: Progressing     Problem: PAIN - ADULT  Goal: Verbalizes/displays adequate comfort level or baseline comfort level  Description: Interventions:  - Encourage patient to monitor pain and request assistance  - Assess pain using appropriate pain scale  - Administer analgesics based on type and severity of pain and evaluate response  - Implement non-pharmacological measures as appropriate and evaluate response  - Consider cultural and social influences on pain and pain management  - Notify physician/advanced practitioner if interventions unsuccessful or patient reports new pain  Outcome: Progressing     Problem: INFECTION - ADULT  Goal: Absence or prevention of progression during hospitalization  Description: INTERVENTIONS:  - Assess and monitor for signs and symptoms of infection  - Monitor lab/diagnostic results  - Monitor all insertion sites, i.e. indwelling lines, tubes, and drains  - Monitor endotracheal if appropriate and nasal secretions for changes in amount and color  - [place] appropriate cooling/warming therapies per order  - Administer medications as ordered  - Instruct and encourage patient and family to use good hand hygiene technique  - Identify and instruct in appropriate isolation precautions for  identified infection/condition  Outcome: Progressing  Goal: Absence of fever/infection during neutropenic period  Description: INTERVENTIONS:  - Monitor WBC    Outcome: Progressing     Problem: SAFETY ADULT  Goal: Patient will remain free of falls  Description: INTERVENTIONS:  - Educate patient/family on patient safety including physical limitations  - Instruct patient to call for assistance with activity   - Consult OT/PT to assist with strengthening/mobility   - Keep Call bell within reach  - Keep bed low and locked with side rails adjusted as appropriate  - Keep care items and personal belongings within reach  - Initiate and maintain comfort rounds  - Make Fall Risk Sign visible to staff  - Offer Toileting every 2 Hours, in advance of need  - Initiate/Maintain Bed alarm  - Obtain necessary fall risk management equipment  - Apply yellow socks and bracelet for high fall risk patients  - Consider moving patient to room near nurses station  Outcome: Progressing  Goal: Maintain or return to baseline ADL function  Description: INTERVENTIONS:  -  Assess patient's ability to carry out ADLs; assess patient's baseline for ADL function and identify physical deficits which impact ability to perform ADLs (bathing, care of mouth/teeth, toileting, grooming, dressing, etc.)  - Assess/evaluate cause of self-care deficits   - Assess range of motion  - Assess patient's mobility; develop plan if impaired  - Assess patient's need for assistive devices and provide as appropriate  - Encourage maximum independence but intervene and supervise when necessary  - Involve family in performance of ADLs  - Assess for home care needs following discharge   - Consider OT consult to assist with ADL evaluation and planning for discharge  - Provide patient education as appropriate  Outcome: Progressing  Goal: Maintains/Returns to pre admission functional level  Description: INTERVENTIONS:  - Perform AM-PAC 6 Click Basic Mobility/ Daily Activity  assessment daily.  - Set and communicate daily mobility goal to care team and patient/family/caregiver.   - Collaborate with rehabilitation services on mobility goals if consulted  - Perform Range of Motion 3 times a day.  - Reposition patient every 2 hours.  - Dangle patient 3 times a day  - Stand patient 3 times a day  - Ambulate patient 3 times a day  - Out of bed to chair 3 times a day   - Out of bed for meals 3 times a day  - Out of bed for toileting  - Record patient progress and toleration of activity level   Outcome: Progressing     Problem: DISCHARGE PLANNING  Goal: Discharge to home or other facility with appropriate resources  Description: INTERVENTIONS:  - Identify barriers to discharge w/patient and caregiver  - Arrange for needed discharge resources and transportation as appropriate  - Identify discharge learning needs (meds, wound care, etc.)  - Arrange for interpretive services to assist at discharge as needed  - Refer to Case Management Department for coordinating discharge planning if the patient needs post-hospital services based on physician/advanced practitioner order or complex needs related to functional status, cognitive ability, or social support system  Outcome: Progressing     Problem: Knowledge Deficit  Goal: Patient/family/caregiver demonstrates understanding of disease process, treatment plan, medications, and discharge instructions  Description: Complete learning assessment and assess knowledge base.  Interventions:  - Provide teaching at level of understanding  - Provide teaching via preferred learning methods  Outcome: Progressing

## 2024-04-03 NOTE — ASSESSMENT & PLAN NOTE
Is cutting down on smoking, is smoking 2-3 cigarettes a day currently.    Plan:  Nicotine declined.  Tobacco cessation counseling provided.

## 2024-04-03 NOTE — ANESTHESIA PREPROCEDURE EVALUATION
Procedure:  ENDARTERECTOMY ARTERY CAROTID (Left: Neck)    Relevant Problems   CARDIO   (+) Disorder of arteries and arterioles, unspecified (HCC)   (+) Hyperlipidemia   (+) Peripheral arterial occlusive disease (HCC)   (+) Stricture of artery (HCC)      /RENAL   (+) CKD (chronic kidney disease) stage 3, GFR 30-59 ml/min (HCC)      MUSCULOSKELETAL   (+) Degeneration of intervertebral disc   (+) Osteoarthritis      NEURO/PSYCH   (+) Atherosclerosis of native arteries of extremities with intermittent claudication, unspecified extremity ([facility])      PULMONARY   (+) COPD (chronic obstructive pulmonary disease) ([facility])   (+) Smoker        Physical Exam    Airway    Mallampati score: II  TM Distance: >3 FB  Neck ROM: limited     Dental   No notable dental hx     Cardiovascular  Rhythm: regular, Rate: normal, Cardiovascular exam normal    Pulmonary  Pulmonary exam normal Breath sounds clear to auscultation    Other Findings  post-pubertal.      Anesthesia Plan  ASA Score- 3     Anesthesia Type- general with ASA Monitors.         Additional Monitors: arterial line.    Airway Plan: ETT.           Plan Factors-Exercise tolerance (METS): <4 METS.    Chart reviewed. EKG reviewed.  Existing labs reviewed. Patient summary reviewed.    Patient is a current smoker.  Patient instructed to abstain from smoking on day of procedure. Patient did not smoke on day of surgery.    There is medical exclusion for perioperative obstructive sleep apnea risk education.        Induction- intravenous.    Postoperative Plan- . Planned trial extubation    Informed Consent- Anesthetic plan and risks discussed with patient.

## 2024-04-03 NOTE — ASSESSMENT & PLAN NOTE
Lab Results   Component Value Date    EGFR 46 02/24/2024    EGFR 50 01/06/2024    EGFR 57 11/27/2023    CREATININE 1.10 02/24/2024    CREATININE 1.02 01/06/2024    CREATININE 0.93 11/27/2023     Baseline creatinine 0.9-1    Plan:  Monitor renal indices   Avoid hypotension and nephrotoxic agents.

## 2024-04-03 NOTE — ASSESSMENT & PLAN NOTE
On Simvastatin outpatient    Plan:  Pravastatin while inpatient as simvastatin is not in formulary.

## 2024-04-03 NOTE — ASSESSMENT & PLAN NOTE
-High grade left carotid artery stenosis (70-99%). No TIA or CVA history.  -Has been on medical management with Aspirin, Plavix and simvastatin.  - S/P Carotid enterectomy with bovine patch angioplasty 4/3/24.  -Estimated blood loss documented was 150 cc's    Plan:  Resume Plavix on post op day 1  Continue statin  Hemodynamic monitoring with BP goal systolic of 100-160.  Monitor for any progression of swelling around the surgical site.

## 2024-04-03 NOTE — OP NOTE
DATE OF PROCEDURE: 12/19/23    PERFORMING SERVICE: Vascular and Endovascular Surgery    ATTENDING SURGEON: Mango Crockett MD    PREOPERATIVE DIAGNOSIS: Asymptomatic high-grade left carotid artery stenosis    POSTOPERATIVE DIAGNOSIS: Same    PROCEDURE NAME:   1.  Left carotid endarterectomy with bovine patch angioplasty.    ANESTHESIA: general    EBL: 150 cc    UOP:  0 cc    IVF:  1800 cc     SPECIMENS: None    COMPLICATIONS: None    DRAINS: None    INDICATION: 84-year-old female with a history of right carotid endarterectomy with patch angioplasty presented to the outpatient setting with surveillance duplex demonstrating a peak systolic velocity of 337 in the proximal left ICA with an EDV of 105 and a ratio of 7.41.  This is consistent with high-grade stenosis.  We had a long discussion about her stroke risks.  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  We obtained cardiac evaluation for optimization assessment and obtained informed consent for left carotid endarterectomy with patch angioplasty.  The patient agrees to proceed on the day of surgery as well.    INTRAOPERATIVE FINDINGS: Appropriate postoperative duplex with no abnormalities in waveform patterns and no identified intraluminal defects.    ASSISTING SURGEON: Mango Crockett MD    DESCRIPTION OF PROCEDURE:   The patient was identified in the holding room.  The patient was then brought to the operating room and placed supine position. A prep verification and time out were performed identifying patient's name, mrn, side and site of the procedure. Preoperative antibiotics were administered within one hour of incision. General anesthesia was induced. The left neck was prepped and draped in the usual sterile fashion. An incision was created along the anterior border of the sternomastoid muscle. Dissection was carried down to and through the platysma with the  cautery. The sternomastoid was mobilized and retracted the internal jugular vein as identified and retracted laterally. The facial vein was identified and ligated with ligatures of 3-0 silk then divided. The carotid sheath was opened and the internal, external and common carotid arteries as well as the superior thyroid artery were looped with silastic loops. Heparin was administered to reach ACT's of 250. The arteries were controlled with traction on the vessel loops.     An arteriotomy was created with an 11 blade and Wright scissors extending beyond the visible disease in the common and internal carotid arteries. The carotid plaque was then elevated and transected in the common carotid with fine scissors. An eversion endarterectomy of the external carotid was performed. The plaque was then elevated and rotated free of the distal internal carotid leaving an acceptable endpoint.  2 tacking sutures were placed at the distal endpoint with 7-0 Prolene suture.    A bovine pericardial patch was then prepared and sewn in place using a running suture of 6-0 prolene from either side of the arteriotomy. Once the suture line was complete, the arteries were each flushed. The suture line was secured. Control was released to the common and external carotid initially. After several cardiac cycles to flush any debris or air into the external, the internal control was also released.     A portable ultrasound was brought into the room and a high frequency footplate probe prepped into a sterile sleeve. This was used to take images from the entire operated segment. No visible defects or doppler velocity elevations indicative of residual stenosis were appreciated in the common or internal carotid artery.  We interpreted this as a satisfactory completion duplex study and a satisfactory endarterectomy procedure.     Protamine sulfate was administered. The wound was irrigated with sterile saline.  The platysma was closed in a watertight  fashion using a running suture of 3-0 Monocryl.  The skin was re-approximated with a running intracuticular suture of 4-0 monocryl. Dermabond was placed in the skin. The patient was neurologically when they were awakened from anesthesia and had no new neurologic deficits, the patient moved all four extremities and followed commands. The patient was then transferred to recovery room in good condition.      Mango Crockett MD  12/19/23  3:54 PM    Vascular Quality Initiative - Carotid Endarterectomy     Urgency:  Urgent    Anesthesia: General Type: Conventional    Side: left    Patch Type: Bovine Pericardial     If Prosthetic, Patch : Not applicable    Shunt: No    Skin Prep: Chlorhexidine    Drain: no  Heparin: yes    Protamine: yes      Dextran: no     Re-explore artery after closure: no    Total Procedure time: 98 min    Monitoring:   EEG: no   Stump Pressure: no   Other: no    Completion Study:   Doppler: no   Duplex: yes   Arteriogram: no    Concomitant Procedure:   Proximal Endovascular: no   Distal Endovascular: no   CABG: no   Other Arterial Op: no

## 2024-04-03 NOTE — ANESTHESIA POSTPROCEDURE EVALUATION
Post-Op Assessment Note    CV Status:  Stable  Pain Score: 1    Pain management: adequate       Mental Status:  Alert and awake   Hydration Status:  Euvolemic   PONV Controlled:  Controlled   Airway Patency:  Patent     Post Op Vitals Reviewed: Yes    No anethesia notable event occurred.    Staff: Anesthesiologist               BP      Temp      Pulse 60 (04/03/24 1700)   Resp 20 (04/03/24 1700)    SpO2 93 % (04/03/24 1700)       yes

## 2024-04-03 NOTE — H&P
Assessment/Plan:     Carotid artery stenosis without cerebral infarction, left  83-year-old female with high-grade left carotid artery stenosis, asymptomatic.  Medical compliance with aspirin statin and Plavix discussed.  Left carotid endarterectomy today.  Patient marked.     Subjective:       Patient ID: Jackelin Song is a 83 y.o. female.     Patient presents to review CTA h/n s/p R CEA 9/28/22.     MARCELLO Benítez is a pleasant 83-year-old female who presented on February 15 for evaluation of her severe high-grade left carotid artery stenosis which is asymptomatic.  She currently also denies any focal weakness or numbness in either extremity or her face.  She denies temporary blindness.  She is still dealing with the stress from ongoing difficulty with her daughters metastatic ovarian cancer.  She presents today with no further questions regarding her care.  She describes her outpatient cardiology evaluation as helpful.     Review of Systems   Constitutional: Negative.    HENT: Negative.     Eyes: Negative.    Respiratory: Negative.     Cardiovascular: Negative.    Gastrointestinal: Negative.    Endocrine: Negative.    Genitourinary: Negative.    Musculoskeletal: Negative.    Skin: Negative.    Allergic/Immunologic: Negative.    Neurological: Negative.    Hematological: Negative.    Psychiatric/Behavioral: Negative.         Objective:  Vitals:    04/03/24 0943   BP: (!) 172/80   Pulse: 64   Resp: 18   Temp: (!) 97.3 °F (36.3 °C)   SpO2: 97%       Physical Exam  Constitutional:       Appearance: Normal appearance.   HENT:      Head: Normocephalic and atraumatic.      Nose: No congestion or rhinorrhea.   Eyes:      Extraocular Movements: Extraocular movements intact.      Pupils: Pupils are equal, round, and reactive to light.   Cardiovascular:      Rate and Rhythm: Normal rate and regular rhythm.   Pulmonary:      Effort: Pulmonary effort is normal.      Breath sounds: No stridor.   Abdominal:      General:  There is no distension.      Tenderness: There is no abdominal tenderness.   Musculoskeletal:         General: No swelling. Normal range of motion.      Cervical back: Normal range of motion and neck supple.   Skin:     General: Skin is warm.      Coloration: Skin is not jaundiced.   Neurological:      General: No focal deficit present.      Mental Status: She is alert and oriented to person, place, and time.   Psychiatric:         Mood and Affect: Mood normal.         Behavior: Behavior normal.

## 2024-04-03 NOTE — DISCHARGE INSTR - AVS FIRST PAGE
DISCHARGE INSTRUCTIONS  CAROTID ENDARTERECTOMY    ACTIVITY:  Limit your physical activity to walking for the first week and then increase your activity as tolerated.  Walking up steps and normal activities may be resumed as you feel ready.  Avoid strenuous activity such as vigorous exercise.  Avoid heavy lifting (do not lift more than 15 pounds) for the first four weeks after surgery.  You should not drive a car for at least one week following discharge from the hospital and you are off all narcotic pain medication.  You may ride in a car.  If you have had a stroke or mini-stroke, please consult with your neurologist prior to driving.    DO NOT START OR RESUME ANY PREVIOUSLY PLANNED THERAPY (PHYSICAL, CARDIAC, REHAB, ETC…) UNTIL YOU DISCUSS WITH YOUR SURGEON AND THEY FEEL IT IS SAFE TO ENGAGE IN THERAPY.    DIET:  Resume your normal diet.  Good nutrition is important for healing of your incision.  You can expect to have a sore throat and trouble swallowing after surgery which should improve quickly.  If you feel like you are choking, please call your doctor.    RECURRENT SYMPTOMS: If you develop any new numbness, weakness, vision changes, drooping of the face, or difficulty with speech after discharge, CALL 911 or go to the nearest Emergency department immediately.    INCISION SITE:  You have surgical glue at your incision site.  There are stitches present under the skin which will absorb on their own.  The glue is used to cover the incision, assist in closure, and prevent contamination. This adhesive will darken and peel away on its own within one to two weeks. Do not pick at it.    You should shower daily.  Wash incision daily with soap and water, but do not rub or scrub the incision; rinse thoroughly and pat dry.  Do not bathe in a tub or swim for the first 4 weeks following surgery or if you have any open wounds.  It is normal to have some bruising, swelling or discoloration around the incision.  IF increasing  redness, pain, bleeding or a bulge develops, call our office immediately.    If you notice any active bleeding at the site, apply pressure to the site and call our office ([phone]) or [phone].    FOLLOW UP STUDIES: Doppler ultrasound studies are very important to your post-operative care. Your surgeon will arrange them at your first postoperative visit. The first study will be 3 months after surgery.    FOLLOW UP APPOINTMENTS:  Making and keeping follow up appointments and ultrasound tests are important to your recovery.  If you have difficulty making it to or keeping your follow up appointments, call the office.    If you have increased pain, fever >101.5, increased drainage, redness or a bad smell at your surgery site, new coldness/numbness of your arm or leg, please call us immediately and GO directly to the ER.    PLEASE CALL THE OFFICE IF YOU HAVE ANY QUESTIONS  [phone]  [phone] [address], [address], [place] [address]  [address] [facility], [address], [place] [address]  [address] [place] [address]  [address] [place] [address], [address], [place] [address]  [address] [facility], [place] [address]  [address] [facility], 2nd Floor, [address], [place] [address]  [address] [facility], [address], [place] [address]  [address] [facility], 1st Floor, [address], [place] [address]  [address], [place] [address]  [address] [address]

## 2024-04-04 ENCOUNTER — TELEPHONE (OUTPATIENT)
Dept: FAMILY MEDICINE CLINIC | Facility: CLINIC | Age: 84
End: 2024-04-04

## 2024-04-04 ENCOUNTER — TRANSITIONAL CARE MANAGEMENT (OUTPATIENT)
Dept: FAMILY MEDICINE CLINIC | Facility: CLINIC | Age: 84
End: 2024-04-04

## 2024-04-04 VITALS
SYSTOLIC BLOOD PRESSURE: 135 MMHG | RESPIRATION RATE: 21 BRPM | DIASTOLIC BLOOD PRESSURE: 62 MMHG | HEART RATE: 76 BPM | HEIGHT: 61 IN | TEMPERATURE: 97.6 F | WEIGHT: 160.94 LBS | OXYGEN SATURATION: 96 % | BODY MASS INDEX: 30.39 KG/M2

## 2024-04-04 LAB
ANION GAP SERPL CALCULATED.3IONS-SCNC: 5 MMOL/L (ref 4–13)
APTT PPP: 34 SECONDS (ref 23–37)
BUN SERPL-MCNC: 11 MG/DL (ref 5–25)
CALCIUM SERPL-MCNC: 8.3 MG/DL (ref 8.4–10.2)
CHLORIDE SERPL-SCNC: 112 MMOL/L (ref 96–108)
CO2 SERPL-SCNC: 21 MMOL/L (ref 21–32)
CREAT SERPL-MCNC: 0.86 MG/DL (ref 0.6–1.3)
ERYTHROCYTE [DISTWIDTH] IN BLOOD BY AUTOMATED COUNT: 14.1 % (ref 11.6–15.1)
GFR SERPL CREATININE-BSD FRML MDRD: 62 ML/MIN/1.73SQ M
GLUCOSE SERPL-MCNC: 107 MG/DL (ref 65–140)
HCT VFR BLD AUTO: 33.4 % (ref 34.8–46.1)
HGB BLD-MCNC: 11.1 G/DL (ref 11.5–15.4)
INR PPP: 1 (ref 0.84–1.19)
MAGNESIUM SERPL-MCNC: 2.4 MG/DL (ref 1.9–2.7)
MCH RBC QN AUTO: 34 PG (ref 26.8–34.3)
MCHC RBC AUTO-ENTMCNC: 33.2 G/DL (ref 31.4–37.4)
MCV RBC AUTO: 103 FL (ref 82–98)
PHOSPHATE SERPL-MCNC: 3.2 MG/DL (ref 2.3–4.1)
PLATELET # BLD AUTO: 230 THOUSANDS/UL (ref 149–390)
PMV BLD AUTO: 10.3 FL (ref 8.9–12.7)
POTASSIUM SERPL-SCNC: 5 MMOL/L (ref 3.5–5.3)
PROTHROMBIN TIME: 13.4 SECONDS (ref 11.6–14.5)
RBC # BLD AUTO: 3.26 MILLION/UL (ref 3.81–5.12)
SODIUM SERPL-SCNC: 138 MMOL/L (ref 135–147)
WBC # BLD AUTO: 10.85 THOUSAND/UL (ref 4.31–10.16)

## 2024-04-04 PROCEDURE — 99024 POSTOP FOLLOW-UP VISIT: CPT | Performed by: SURGERY

## 2024-04-04 PROCEDURE — 80048 BASIC METABOLIC PNL TOTAL CA: CPT | Performed by: STUDENT IN AN ORGANIZED HEALTH CARE EDUCATION/TRAINING PROGRAM

## 2024-04-04 PROCEDURE — 85610 PROTHROMBIN TIME: CPT | Performed by: STUDENT IN AN ORGANIZED HEALTH CARE EDUCATION/TRAINING PROGRAM

## 2024-04-04 PROCEDURE — 84100 ASSAY OF PHOSPHORUS: CPT

## 2024-04-04 PROCEDURE — 85027 COMPLETE CBC AUTOMATED: CPT | Performed by: STUDENT IN AN ORGANIZED HEALTH CARE EDUCATION/TRAINING PROGRAM

## 2024-04-04 PROCEDURE — 99232 SBSQ HOSP IP/OBS MODERATE 35: CPT | Performed by: INTERNAL MEDICINE

## 2024-04-04 PROCEDURE — 85730 THROMBOPLASTIN TIME PARTIAL: CPT | Performed by: STUDENT IN AN ORGANIZED HEALTH CARE EDUCATION/TRAINING PROGRAM

## 2024-04-04 PROCEDURE — 83735 ASSAY OF MAGNESIUM: CPT

## 2024-04-04 PROCEDURE — NC001 PR NO CHARGE: Performed by: SURGERY

## 2024-04-04 PROCEDURE — 99222 1ST HOSP IP/OBS MODERATE 55: CPT | Performed by: INTERNAL MEDICINE

## 2024-04-04 RX ORDER — OXYCODONE HYDROCHLORIDE 5 MG/1
5 TABLET ORAL EVERY 6 HOURS PRN
Qty: 10 TABLET | Refills: 0 | Status: SHIPPED | OUTPATIENT
Start: 2024-04-04 | End: 2024-04-09

## 2024-04-04 RX ORDER — ACETAMINOPHEN 325 MG/1
650 TABLET ORAL EVERY 4 HOURS PRN
COMMUNITY
Start: 2024-04-04

## 2024-04-04 RX ADMIN — CLOPIDOGREL BISULFATE 75 MG: 75 TABLET ORAL at 08:46

## 2024-04-04 RX ADMIN — HEPARIN SODIUM 5000 UNITS: 5000 INJECTION INTRAVENOUS; SUBCUTANEOUS at 05:07

## 2024-04-04 RX ADMIN — CHLORHEXIDINE GLUCONATE 15 ML: 1.2 RINSE ORAL at 08:46

## 2024-04-04 RX ADMIN — ACETAMINOPHEN 325MG 975 MG: 325 TABLET ORAL at 05:07

## 2024-04-04 RX ADMIN — CEFAZOLIN SODIUM 1000 MG: 1 SOLUTION INTRAVENOUS at 05:07

## 2024-04-04 NOTE — ASSESSMENT & PLAN NOTE
Lab Results   Component Value Date    EGFR 62 04/04/2024    EGFR 55 04/03/2024    EGFR 46 02/24/2024    CREATININE 0.86 04/04/2024    CREATININE 0.94 04/03/2024    CREATININE 1.10 02/24/2024     Baseline creatinine 0.9-1  Cr is stable.    Plan:  Monitor renal indices   Avoid hypotension and nephrotoxic agents.

## 2024-04-04 NOTE — PLAN OF CARE
Problem: Prexisting or High Potential for Compromised Skin Integrity  Goal: Skin integrity is maintained or improved  Description: INTERVENTIONS:  - Identify patients at risk for skin breakdown  - Assess and monitor skin integrity  - Assess and monitor nutrition and hydration status  - Monitor labs   - Assess for incontinence   - Turn and reposition patient  - Assist with mobility/ambulation  - Relieve pressure over bony prominences  - Avoid friction and shearing  - Provide appropriate hygiene as needed including keeping skin clean and dry  - Evaluate need for skin moisturizer/barrier cream  - Collaborate with interdisciplinary team   - Patient/family teaching  - Consider wound care consult   Outcome: Progressing     Problem: PAIN - ADULT  Goal: Verbalizes/displays adequate comfort level or baseline comfort level  Description: Interventions:  - Encourage patient to monitor pain and request assistance  - Assess pain using appropriate pain scale  - Administer analgesics based on type and severity of pain and evaluate response  - Implement non-pharmacological measures as appropriate and evaluate response  - Consider cultural and social influences on pain and pain management  - Notify physician/advanced practitioner if interventions unsuccessful or patient reports new pain  Outcome: Progressing     Problem: INFECTION - ADULT  Goal: Absence or prevention of progression during hospitalization  Description: INTERVENTIONS:  - Assess and monitor for signs and symptoms of infection  - Monitor lab/diagnostic results  - Monitor all insertion sites, i.e. indwelling lines, tubes, and drains  - Monitor endotracheal if appropriate and nasal secretions for changes in amount and color  - [place] appropriate cooling/warming therapies per order  - Administer medications as ordered  - Instruct and encourage patient and family to use good hand hygiene technique  - Identify and instruct in appropriate isolation precautions for  identified infection/condition  Outcome: Progressing  Goal: Absence of fever/infection during neutropenic period  Description: INTERVENTIONS:  - Monitor WBC    Outcome: Progressing     Problem: SAFETY ADULT  Goal: Patient will remain free of falls  Description: INTERVENTIONS:  - Educate patient/family on patient safety including physical limitations  - Instruct patient to call for assistance with activity   - Consult OT/PT to assist with strengthening/mobility   - Keep Call bell within reach  - Keep bed low and locked with side rails adjusted as appropriate  - Keep care items and personal belongings within reach  - Initiate and maintain comfort rounds  - Make Fall Risk Sign visible to staff  - Offer Toileting every 2 Hours, in advance of need  - Initiate/Maintain Bed alarm  - Obtain necessary fall risk management equipment  - Apply yellow socks and bracelet for high fall risk patients  - Consider moving patient to room near nurses station  Outcome: Progressing  Goal: Maintain or return to baseline ADL function  Description: INTERVENTIONS:  -  Assess patient's ability to carry out ADLs; assess patient's baseline for ADL function and identify physical deficits which impact ability to perform ADLs (bathing, care of mouth/teeth, toileting, grooming, dressing, etc.)  - Assess/evaluate cause of self-care deficits   - Assess range of motion  - Assess patient's mobility; develop plan if impaired  - Assess patient's need for assistive devices and provide as appropriate  - Encourage maximum independence but intervene and supervise when necessary  - Involve family in performance of ADLs  - Assess for home care needs following discharge   - Consider OT consult to assist with ADL evaluation and planning for discharge  - Provide patient education as appropriate  Outcome: Progressing  Goal: Maintains/Returns to pre admission functional level  Description: INTERVENTIONS:  - Perform AM-PAC 6 Click Basic Mobility/ Daily Activity  assessment daily.  - Set and communicate daily mobility goal to care team and patient/family/caregiver.   - Collaborate with rehabilitation services on mobility goals if consulted  - Perform Range of Motion 3 times a day.  - Reposition patient every 2 hours.  - Dangle patient 3 times a day  - Stand patient 3 times a day  - Ambulate patient 3 times a day  - Out of bed to chair 3 times a day   - Out of bed for meals 3 times a day  - Out of bed for toileting  - Record patient progress and toleration of activity level   Outcome: Progressing     Problem: DISCHARGE PLANNING  Goal: Discharge to home or other facility with appropriate resources  Description: INTERVENTIONS:  - Identify barriers to discharge w/patient and caregiver  - Arrange for needed discharge resources and transportation as appropriate  - Identify discharge learning needs (meds, wound care, etc.)  - Arrange for interpretive services to assist at discharge as needed  - Refer to Case Management Department for coordinating discharge planning if the patient needs post-hospital services based on physician/advanced practitioner order or complex needs related to functional status, cognitive ability, or social support system  Outcome: Progressing     Problem: Knowledge Deficit  Goal: Patient/family/caregiver demonstrates understanding of disease process, treatment plan, medications, and discharge instructions  Description: Complete learning assessment and assess knowledge base.  Interventions:  - Provide teaching at level of understanding  - Provide teaching via preferred learning methods  Outcome: Progressing

## 2024-04-04 NOTE — ASSESSMENT & PLAN NOTE
Clinically stable, not on any inhalers or O2 outpatient.  Tobacco cessation encouraged.  Saturating well on room air.    Plan:  Continue hemodynamic monitoring.

## 2024-04-04 NOTE — UTILIZATION REVIEW
"Initial Clinical Review    Elective IP surgical procedure  Age/Sex: 84 y.o. female  Surgery Date: 4/3  Procedure: Left carotid endarterectomy with bovine patch angioplasty.   Anesthesia: General  Operative Findings: Appropriate postoperative duplex with no abnormalities in waveform patterns and no identified intraluminal defects.     POD#1 Progress Note: Pt transiently hypotensive w/ SBPs in 90s overnight; 1 L IVF bolus and phenylephrine support for 4 hours maintained SBP > 100. Now SBP ranging from 116-128. On exam, swelling/bruising on L carotid surgical site, wheezing. Plan: resume Plavix, continue statin, SBP goal 100-160; Trend labs, replete electrolytes as needed; analgesics, OOB as able. Telemetry, continuous pulse ox. DW, I&O, neuro checks, incentive spirometry.    Admission Orders: Date/Time/Statement:   Admission Orders (From admission, onward)       Ordered        04/03/24 1509  Inpatient Admission  Once                          Orders Placed This Encounter   Procedures    Inpatient Admission     Standing Status:   Standing     Number of Occurrences:   1     Order Specific Question:   Level of Care     Answer:   Critical Care [15]     Order Specific Question:   Estimated length of stay     Answer:   Inpatient Only Surgery     Vital Signs:  Ht 5' 1\" (1.549 m)   Wt 73 kg (160 lb 15 oz)   BMI 30.41 kg/m²     Date/Time Temp Pulse Resp BP MAP (mmHg) Arterial Line BP MAP SpO2 FIO2 (%) - Nasal Cannula Nasal Cannula O2 Flow Rate (L/min) O2 Device   04/04/24 1000 -- 76 21 -- -- -- -- 96 % -- -- --   04/04/24 0900 -- 82 32 Abnormal  -- -- -- -- 95 % -- -- --   04/04/24 0837 -- 76 23 Abnormal  135/62 96 -- -- 96 % -- -- --   04/04/24 0821 -- 78 30 Abnormal  -- -- 138/56 88 mmHg 94 % -- -- --   04/04/24 0800 -- 68 23 Abnormal  -- -- 144/52 86 mmHg 94 % -- -- --   04/04/24 0730 -- 62 29 Abnormal  114/59 88 148/52 86 mmHg 95 % -- -- --   04/04/24 0715 97.6 °F (36.4 °C) 64 22 -- -- 150/52 86 mmHg 95 % -- -- None (Room " air)   04/04/24 0700 -- 70 20 -- -- 114/44 74 mmHg 93 % -- -- --   04/04/24 0600 -- 56 24 Abnormal  -- -- 116/38 66 mmHg 90 % -- -- --   04/04/24 0500 -- 54 Abnormal  18 -- -- 128/42 70 mmHg 94 % -- -- --   04/04/24 0400 -- 60 21 -- -- 122/42 70 mmHg 91 % -- -- --   04/04/24 0319 97.6 °F (36.4 °C) 56 17 122/53 86 128/42 70 mmHg 94 % -- -- --   04/04/24 0300 -- 64 19 -- -- 110/40 64 mmHg Abnormal  95 % -- -- --   04/04/24 0200 -- 52 Abnormal  17 -- -- 116/40 66 mmHg 92 % -- -- --   04/04/24 0100 -- 52 Abnormal  14 -- -- 102/36 58 mmHg Abnormal  89 % Abnormal  -- -- None (Room air)   04/04/24 0000 -- 50 Abnormal  18 -- -- 94/34 54 mmHg Abnormal  90 % -- -- --   04/03/24 2343 97.6 °F (36.4 °C) 50 Abnormal  19 91/49 Abnormal  -- 96/34 54 mmHg Abnormal  90 % -- -- --   04/03/24 2302 -- -- 22 -- -- -- -- -- -- -- --   04/03/24 2300 -- 62 23 Abnormal  -- -- 102/36 58 mmHg Abnormal  90 % -- -- --   04/03/24 2200 -- 54 Abnormal  16 -- -- 102/36 58 mmHg Abnormal  88 % Abnormal  -- -- --   04/03/24 2100 -- 58 18 -- -- 98/34 56 mmHg Abnormal  89 % Abnormal  -- -- --   04/03/24 2000 -- 56 20 -- -- 92/32 50 mmHg Abnormal  93 % -- -- --   04/03/24 1900 -- 60 22 -- -- 96/32 50 mmHg Abnormal  94 % -- -- --   04/03/24 1830 -- 66 16 86/38 Abnormal  49 Abnormal  106/30 50 mmHg Abnormal  95 % -- -- --   04/03/24 1815 -- 74 16 -- -- 98/34 52 mmHg Abnormal  94 % -- -- --   04/03/24 1800 -- 74 18 -- -- 112/38 58 mmHg Abnormal  90 % -- -- --   04/03/24 1745 -- 68 18 -- -- 102/38 58 mmHg Abnormal  92 % -- -- --   04/03/24 1730 -- 62 23 Abnormal  86/41 Abnormal  58 Abnormal  126/42 64 mmHg Abnormal  95 % -- -- --   04/03/24 1715 -- 60 12 -- -- 104/36 56 mmHg Abnormal  94 % -- -- --   04/03/24 1700 -- 60 20 -- -- 112/38 58 mmHg Abnormal  93 % -- -- --   04/03/24 1651 97.7 °F (36.5 °C) 63 17 115/38 Abnormal  -- -- -- -- -- -- --   04/03/24 1645 -- 56 18 81/41 Abnormal  53 Abnormal  110/38 58 mmHg Abnormal  94 % -- -- --   04/03/24 1630 -- 58 18  -- -- 122/40 62 mmHg Abnormal  93 % -- -- None (Room air)   04/03/24 1600 -- 62 27 Abnormal  97/55 73 132/50 74 mmHg 97 % -- -- --   04/03/24 1558 97 °F (36.1 °C) Abnormal  62 16 97/55 72 133/51 71 mmHg 94 % 28 2 L/min Nasal cannula   04/03/24 1538 -- 56 17 142/60 87 130/46 74 mmHg 97 % 28 2 L/min Nasal cannula   04/03/24 1523 -- 52 Abnormal  12 114/56 80 126/48 74 mmHg 97 % 28 2 L/min Nasal cannula   04/03/24 1508 97.8 °F (36.6 °C) 52 Abnormal  16 118/55 -- 124/85 70 mmHg 98 % 28 2 L/min Nasal cannula   04/03/24 0943 97.3 °F (36.3 °C) Abnormal  64 18 172/80 Abnormal  -- -- -- 97 % -- -- None (Room air)       Pertinent Labs/Diagnostic Test Results:     Results from last 7 days   Lab Units 04/04/24  0505 04/03/24  1723   WBC Thousand/uL 10.85* 9.09   HEMOGLOBIN g/dL 11.1* 11.6   HEMATOCRIT % 33.4* 35.5   PLATELETS Thousands/uL 230 215   NEUTROS ABS Thousands/µL  --  7.55         Results from last 7 days   Lab Units 04/04/24  0505 04/03/24  1723   SODIUM mmol/L 138 140   POTASSIUM mmol/L 5.0 3.5   CHLORIDE mmol/L 112* 111*   CO2 mmol/L 21 23   ANION GAP mmol/L 5 6   BUN mg/dL 11 11   CREATININE mg/dL 0.86 0.94   EGFR ml/min/1.73sq m 62 55   CALCIUM mg/dL 8.3* 8.3*   CALCIUM, IONIZED mmol/L  --  1.13   MAGNESIUM mg/dL 2.4 1.8*   PHOSPHORUS mg/dL 3.2 3.5             Results from last 7 days   Lab Units 04/04/24  0505 04/03/24  1723   GLUCOSE RANDOM mg/dL 107 121     Results from last 7 days   Lab Units 04/04/24  0505   PROTIME seconds 13.4   INR  1.00   PTT seconds 34         Diet: Regular  Mobility: bed rest 6 hours post-op, then up & OOB as tolerated, OOB to chair TID  DVT Prophylaxis: SCDs, heparin SQ    Medications/Pain Control:   Scheduled Medications:  acetaminophen, 975 mg, Oral, Q6H THOMAS  chlorhexidine, 15 mL, Mouth/Throat, Q12H THOMAS  clopidogrel, 75 mg, Oral, Daily  heparin (porcine), 5,000 Units, Subcutaneous, Q8H THOMAS  melatonin, 6 mg, Oral, HS  pravastatin, 40 mg, Oral, Daily With Dinner    Continuous IV  Infusions:  phenylephine,  mcg/min, Intravenous, Titrated (Start: 04/03/24 2200; Stopped: 04/04/24 0319)    PRN Meds:  calcium carbonate, 500 mg, Oral, Daily PRN; 4/3 x1  lactated ringers, 500 mL bolus, Intravenous; 4/3 x1  magnesium sulfate, 2 g, Intravenous; 4/3 x1  oxyCODONE, 5 mg, Oral, Q4H PRN  potassium chloride, 40 mEq, Oral; 4/3 x1  potassium chloride, 20 mEq, Intravenous; 4/3 x1  sodium chloride 0.9 %, 500 mL bolus, Intravenous; 4/3 x1    Discontinued Meds:  ceFAZolin (ANCEF) IVPB (premix in dextrose) 1,000 mg 50 mL  Dose: 1,000 mg  Freq: Every 8 hours Route: IV  Last Dose: Stopped (04/04/24 0601)  Start: 04/03/24 0930 End: 04/04/24 0601   lactated ringers infusion  Rate: 125 mL/hr Dose: 125 mL/hr  Freq: Continuous Route: IV  Indications of Use: IV Hydration  Last Dose: Stopped (04/03/24 1707)  Start: 04/03/24 0930 End: 04/03/24 1706   sodium chloride 0.9 % infusion  Rate: 75 mL/hr Dose: 75 mL/hr  Freq: Continuous Route: IV  Indications of Use: IV Hydration  Last Dose: Stopped (04/04/24 0701)  Start: 04/03/24 1515 End: 04/04/24 0504       Network Utilization Review Department  ATTENTION: Please call with any questions or concerns to [phone] and carefully listen to the prompts so that you are directed to the right person. All voicemails are confidential.   For Discharge needs, contact Care Management DC Support Team at [phone] opt. 2  Send all requests for admission clinical reviews, approved or denied determinations and any other requests to dedicated fax number below belonging to the campus where the patient is receiving treatment. List of dedicated fax numbers for the Facilities:  FACILITY NAME UR FAX NUMBER   ADMISSION DENIALS (Administrative/Medical Necessity) [phone]   DISCHARGE SUPPORT TEAM (NETWORK) [phone]   PARENT CHILD HEALTH (Maternity/NICU/Pediatrics) [phone]   [facility] [phone]   [facility] [phone]    [facility] [phone]   [facility] [phone]   [facility] [phone]   [facility] [phone]   [facility] [phone]   [facility] [phone]   [facility] [phone]   [facility] [phone]   [facility] [phone]   [facility] [phone]

## 2024-04-04 NOTE — ASSESSMENT & PLAN NOTE
-High grade left carotid artery stenosis (70-99%). No TIA or CVA history.  -Has been on medical management with Aspirin, Plavix and simvastatin.  - S/P Carotid enterectomy with bovine patch angioplasty 4/3/24.  -Estimated blood loss documented was 150 cc's.      Plan:  Resume Plavix   Continue statin  Hemodynamic monitoring with BP goal systolic of 100-160.  Medically stable, discharge planning as per primary team.

## 2024-04-04 NOTE — CASE MANAGEMENT
Case Management Assessment & Discharge Planning Note    Patient name Jackelin Song  Location ICU 07/ICU 07 MRN [id number]  : 1940 Date 2024       Current Admission Date: 4/3/2024  Current Admission Diagnosis:Carotid artery stenosis without cerebral infarction, left   Patient Active Problem List    Diagnosis Date Noted    Vertigo 2023    Severe dizziness 2023    Preoperative cardiovascular examination 2022    Embolism and thrombosis of arteries of the lower extremities (HCC) 2022    Fall 2022    CKD (chronic kidney disease) stage 3, GFR 30-59 ml/min ([facility]) 2021    Disorder of arteries and arterioles, unspecified ([facility]) 2021    Stricture of artery ([facility]) 2021    Age-related nuclear cataract of both eyes 2020    Open angle with borderline findings, low risk, bilateral 2020    Proximal humerus fracture 2016    Smoker     Peripheral arterial occlusive disease ([facility])     Varicose veins of right lower extremity with pain 2016    Atherosclerosis of native arteries of extremities with intermittent claudication, unspecified extremity ([facility]) 2015    Iliac artery stenosis, bilateral (HCC) 2014    COPD (chronic obstructive pulmonary disease) ([facility]) 2012    Degeneration of intervertebral disc 2012    Hyperlipidemia 2012    Carotid artery stenosis without cerebral infarction, left 2012    Osteoarthritis 2012    Osteopenia 2012    Vitamin D deficiency 2012      LOS (days): 1  Geometric Mean LOS (GMLOS) (days): 1.2  Days to GMLOS:0.4     OBJECTIVE:    Risk of Unplanned Readmission Score: 12.45     Current admission status: Inpatient    Preferred Pharmacy:   [facility]/pharmacy #[id number] [address] [address]  Phone: [phone] Fax: [phone]    Primary Care Provider: Ubaldo Trevizo DO    Primary Insurance: [employer] REP  Secondary Insurance:      ASSESSMENT:  Active Health Care Proxies       Kimberlee Lutz [facility] Representative - Daughter   Primary Phone: [phone] (Mobile)                 DISCHARGE DETAILS:    Treatment Team Recommendation: Home  Discharge Destination Plan:: Home  Transport at Discharge : Family     Additional Comments: RNCM consult for discharge planning. Patient is noted to be independent and discharging home with family.

## 2024-04-04 NOTE — DISCHARGE SUMMARY
[facility]  Discharge- Jackelin IWLSON Sine 1940, 84 y.o. female MRN: [id number]  Unit/Bed#: ICU 07 Encounter: [id number]  Primary Care Provider: Ubaldo Trevizo DO   Date and time admitted to hospital: 4/3/2024  9:17 AM    * Carotid artery stenosis without cerebral infarction, left  Assessment & Plan  83 yo female smoker w/ a hx of COPD, HLD, HTN, B/L ANDRÉS S/P R CEA (9/28/22), CKD 3, AIOD and PAD presented to [facility] on 4/3 for a scheduled L CEA. Pt has a hx of asymptomatic high-grade L ANDRÉS. Pt underwent a L CEA on 4/3. Post-op course was uneventful.    Plan:  -Asymptomatic L ANDRÉS  -S/P L CEA on 4/3 (POD #1)  -L neck incision well-approx, no swelling, erythema or drainage, surgical glue closure  -Continue frequent neurovascular checks  -Continue plavix and zocor  -Diet as tolerated  -OOB as tolerated  -Plan for discharge to home today  -Discussed w/ Dr. Bacon      Admission Date: 4/3/2024     Discharge Date:04/04/24    Attending:Mango Gonzalez*     Consultants: Critical Care    Admitting Diagnosis: Carotid artery stenosis without cerebral infarction, left [I65.22]    Principle/ Secondary Diagnosis:  Past Medical History:   Diagnosis Date    Back pain     Carotid artery disease (HCC)     Colon polyp     DVT (deep venous thrombosis) (HCC)     Food allergy     chocholate    Hyperlipidemia     Neck pain     Peripheral arterial occlusive disease (HCC)     Right humeral fracture     right poximal    Smoking     Wears dentures     full upper, partial lower    Wears glasses      Past Surgical History:   Procedure Laterality Date    APPENDECTOMY      BREAST SURGERY      gilberto    COLONOSCOPY      HEMORRHOID SURGERY      ILIAC ARTERY STENT      pt states she has many stents    IR AORTAGRAM WITH RUN-OFF  08/26/2019    FL ARTHROPLASTY GLENOHUMERAL JOINT TOTAL SHOULDER Right 12/08/2016    Procedure: ARTHROPLASTY SHOULDER REVERSE;  Surgeon: Harry Curry MD;  Location: [facility];  Service:  Orthopedics    TX TEAEC W/PATCH GRF CAROTID VERTB SUBCLAV NECK INC Right 09/28/2022    Procedure: ENDARTERECTOMY ARTERY CAROTID w/bovine patch completion duplex imaging;  Surgeon: Hussein Smalls MD;  Location: [facility];  Service: Vascular    TX TEAEC W/PATCH GRF CAROTID VERTB SUBCLAV NECK INC Left 4/3/2024    Procedure: ENDARTERECTOMY ARTERY CAROTID WITH BOVINE PATCH ANGIOPLASTY;  Surgeon: Mnago Crockett MD;  Location: [facility];  Service: Vascular    THROMBOENDARTERECTOMY      TONSILLECTOMY         Procedures Performed:     TX TEAEC W/PATCH GRF CAROTID VERTB SUBCLAV NECK INC [05270] (ENDARTERECTOMY ARTERY CAROTID)  ENDARTERECTOMY ARTERY CAROTID WITH BOVINE PATCH ANGIOPLASTY (Left: Neck)  Procedure(s):  ENDARTERECTOMY ARTERY CAROTID WITH BOVINE PATCH ANGIOPLASTY    Laboratory data at discharge:   Results from last 7 days   Lab Units 04/04/24  0505 04/03/24  1723   WBC Thousand/uL 10.85* 9.09   HEMOGLOBIN g/dL 11.1* 11.6   HEMATOCRIT % 33.4* 35.5   PLATELETS Thousands/uL 230 215     Results from last 7 days   Lab Units 04/04/24  0505 04/03/24  1723   POTASSIUM mmol/L 5.0 3.5   CHLORIDE mmol/L 112* 111*   CO2 mmol/L 21 23   BUN mg/dL 11 11   CREATININE mg/dL 0.86 0.94   CALCIUM mg/dL 8.3* 8.3*     Results from last 7 days   Lab Units 04/04/24  0505   INR  1.00   PTT seconds 34           Discharge instructions/Information to patient and family:   See after visit summary for information provided to patient and family.     Discharge Medications:  See after visit summary for reconciled discharge medications provided to patient and family.      Hospital Course:   83 yo female smoker w/ a hx of COPD, HLD, HTN, B/L ANDRÉS S/P R CEA (9/28/22), CKD 3, AIOD and PAD presented to [facility] on 4/3 for a scheduled L CEA. Pt has a hx of asymptomatic high-grade L ANDRÉS. Pt underwent a L CEA on 4/3. Post-op course uneventful.    POD #1: Pt experienced mild hypotension post-op w/ SBP 80-90s requiring hortencia gtt from ~22:00-03:00  at which time, it was weaned off. Since then, pt remains hemodynamically stable w/ Hgb 11.1 and stable VS w/ -140. Pt is eating, swallowing, voiding and ambulating at baseline. L neck incision well-approx and slightly ecchymotic over the incision site, no swelling, erythema or drainage w/ surgical glue closure. Pt remains neurologically intact w/ no unilateral deficits. Pt denies any headaches, visual changes or unilateral weakness. She reports L neck soreness and is pleasantly surprised that her throat is not sore. Otherwise, pt denies any further complaints at this time, and is insistent on returning home this am as she is caring for her daughter who has metastatic cancer. Continue tylenol prn and oxycodone prn for pain control. E-script for oxycodone sent to pharmacy of pt's choice. Continue plavix and zocor for medical management of ANDRÉS. Pt will follow up in the vascular surgery office; appt scheduled for 4/11/24.      Hospital course was complicated by the following: None    Tobacco use is a significant patient-modifiable risk factor for this patient’s vascular disease with multiple vascular comorbidities, and a significant risk factor for failure of and complications from any endovascular or surgical interventions.    I explained to the patient the effects of smoking including peripheral artery disease, coronary artery disease, cerebrovascular disease as well as cancer and chronic obstructive pulmonary disease. I offered to the patient nicotine replacement therapy as well as referral to the smoking cessation program and access to the quit line 2-248-GONJTNE or ambulatory referral to our network smoking cessation program. Per pt, she has cut down but is still smoking a few cigarettes per day. Pt refused nicotine replacement at this time.     Based on our conversation, this patient does not appear ready to quit  and declined my offer of nicotine replacement or tobacco cessation medications. The patient  did not set a quit date.    I spent approximately 4 minutes on tobacco cessation counseling with this patient.       Prior to discharge, the patient was given instructions for outpatient care and follow-up.  The patient has been instructed to call w/ any questions, changes, or concerns for the medical condition.    For further details of the hospitalization, please refer to the medical record.    Condition at Discharge: good     Provisions for Follow-Up Care:  See after visit summary for information related to follow-up care and any pertinent home health orders.      Disposition: Home    Planned Readmission: No    Discharge Statement   I spent 24 minutes discharging the patient. This time was spent on the day of discharge. I had direct contact with the patient on the day of discharge. Additional documentation is required if more than 30 minutes were spent on discharge.     GABBY Gilbert  4/4/2024

## 2024-04-04 NOTE — ASSESSMENT & PLAN NOTE
85 yo female smoker w/ a hx of COPD, HLD, HTN, B/L ANDRÉS S/P R CEA (9/28/22), CKD 3, AIOD and PAD presented to [facility] on 4/3 for a scheduled L CEA. Pt has a hx of asymptomatic high-grade L ANDRÉS. Pt underwent a L CEA on 4/3. Post-op course was uneventful.    Plan:  -Asymptomatic L ANDRÉS  -S/P L CEA on 4/3 (POD #1)  -L neck incision well-approx, no swelling, erythema or drainage, surgical glue closure  -Continue frequent neurovascular checks  -Continue plavix and zocor  -Diet as tolerated  -OOB as tolerated  -Plan for discharge to home today  -Discussed w/ Dr. Bacon

## 2024-04-04 NOTE — PROGRESS NOTES
[facility]  Progress Note  Name: Jackelin Song I  MRN: [id number]  Unit/Bed#: ICU 07 I Date of Admission: 4/3/2024   Date of Service: 4/4/2024 I Hospital Day: 1    Assessment/Plan   * Carotid artery stenosis without cerebral infarction, left  Assessment & Plan  -High grade left carotid artery stenosis (70-99%). No TIA or CVA history.  -Has been on medical management with Aspirin, Plavix and simvastatin.  - S/P Carotid enterectomy with bovine patch angioplasty 4/3/24.  -Estimated blood loss documented was 150 cc's.      Plan:  Resume Plavix   Continue statin  Hemodynamic monitoring with BP goal systolic of 100-160.  Medically stable, discharge planning as per primary team.    CKD (chronic kidney disease) stage 3, GFR 30-59 ml/min ([facility])  Assessment & Plan  Lab Results   Component Value Date    EGFR 62 04/04/2024    EGFR 55 04/03/2024    EGFR 46 02/24/2024    CREATININE 0.86 04/04/2024    CREATININE 0.94 04/03/2024    CREATININE 1.10 02/24/2024     Baseline creatinine 0.9-1  Cr is stable.    Plan:  Monitor renal indices   Avoid hypotension and nephrotoxic agents.      COPD (chronic obstructive pulmonary disease) ([facility])  Assessment & Plan  Clinically stable, not on any inhalers or O2 outpatient.  Tobacco cessation encouraged.  Saturating well on room air.    Plan:  Continue hemodynamic monitoring.    Smoker  Assessment & Plan  Is cutting down on smoking, is smoking 2-3 cigarettes a day currently.    Plan:  Nicotine declined.  Tobacco cessation counseling provided.    Hyperlipidemia  Assessment & Plan  On Simvastatin outpatient    Plan:  Pravastatin while inpatient as simvastatin is not in formulary.         Disposition: Med Surg    ICU Core Measures     A: Assess, Prevent, and Manage Pain Has pain been assessed? Yes  Need for changes to pain regimen? No   B: Both SAT/SAT  N/A   C: Choice of Sedation RASS Goal: 0 Alert and Calm  Need for changes to sedation or analgesia regimen? No   D:  Delirium CAM-ICU: Negative   E: Early Mobility  Plan for early mobility? Yes   F: Family Engagement Plan for family engagement today? Yes       Review of Invasive Devices:        Brenda Plan: Discontinue arterial line    Prophylaxis:  VTE VTE covered by:  heparin (porcine), Subcutaneous, 5,000 Units at 04/04/24 0507       Stress Ulcer  not ordered         Significant 24hr Events     24hr events: Patient had a BP goal of systolic 100-160 overnight, she transiently became hypotensive with systolic's of 90's needing.  One liter of LR and transient phenylephrine support for 4 hours to maintain SBP above 100. Currently SBP is ranging from 116-128.     Subjective   Review of Systems   Constitutional:  Negative for activity change, chills and fever.   HENT:  Negative for sore throat.    Respiratory:  Positive for cough. Negative for choking and chest tightness.    Cardiovascular:  Negative for chest pain and leg swelling.   Gastrointestinal:  Negative for abdominal pain, nausea and vomiting.   Genitourinary:  Negative for difficulty urinating and dysuria.   Neurological:  Positive for headaches (overnight, resolved with tylenol).   Psychiatric/Behavioral:  Negative for confusion.       Objective                            Vitals I/O      Most Recent Min/Max in 24hrs   Temp 97.6 °F (36.4 °C) Temp  Min: 97 °F (36.1 °C)  Max: 97.8 °F (36.6 °C)   Pulse 78 Pulse  Min: 50  Max: 78   Resp (!) 30 Resp  Min: 12  Max: 30   /62 BP  Min: 81/41  Max: 172/80   O2 Sat 94 % SpO2  Min: 88 %  Max: 98 %      Intake/Output Summary (Last 24 hours) at 4/4/2024 0840  Last data filed at 4/4/2024 0701  Gross per 24 hour   Intake 4842.23 ml   Output 750 ml   Net 4092.23 ml       Diet Regular; Regular House    Invasive Monitoring   Arterial Line  [place] /56  Arterial Line BP  Min: 92/32  Max: 150/52   MAP 88 mmHg  Arterial Line MAP (mmHg)  Min: 50 mmHg  Max: 88 mmHg           Physical Exam   Physical Exam  Vitals reviewed.   Eyes:       General: No scleral icterus.     Extraocular Movements: Extraocular movements intact.      Conjunctiva/sclera: Conjunctivae normal.      Pupils: Pupils are equal, round, and reactive to light.   Skin:     General: Skin is warm.   HENT:      Head: Normocephalic and atraumatic.      Mouth/Throat:      Mouth: Mucous membranes are moist.      Pharynx: No oropharyngeal exudate.   Neck:      Comments: Mild swelling and bruising on around left carotid surgical site. Skin is intact, no bleeding or discharge.   Cardiovascular:      Rate and Rhythm: Normal rate and regular rhythm.   Abdominal: General: Bowel sounds are normal. There is no distension.      Palpations: Abdomen is soft.      Tenderness: There is no abdominal tenderness.   Constitutional:       General: She is not in acute distress.     Appearance: She is well-developed. She is not ill-appearing.   Pulmonary:      Effort: Pulmonary effort is normal. No respiratory distress.      Breath sounds: No stridor. Wheezing present.   Neurological:      Mental Status: She is alert and oriented to person, place and time. Mental status is at baseline.      Motor: gross motor function is at baseline for patient.        Cough reflex.            Diagnostic Studies      EKG: Sinus bradycardia with Sinus arrhythmia HR 44  Imaging:  No imaging was obtained this visit     Medications:  Scheduled PRN   acetaminophen, 975 mg, Q6H THOMAS  chlorhexidine, 15 mL, Q12H THOMAS  clopidogrel, 75 mg, Daily  heparin (porcine), 5,000 Units, Q8H THOMAS  melatonin, 6 mg, HS  pravastatin, 40 mg, Daily With Dinner      calcium carbonate, 500 mg, Daily PRN  labetalol, 5 mg, Q15 Min PRN   And  hydrALAZINE, 15 mg, Q15 Min PRN   And  niCARdipine, 1-15 mg/hr, Continuous PRN  oxyCODONE, 5 mg, Q4H PRN  [START ON 4/6/2024] phenylephine,  mcg/min, Continuous PRN       Continuous    niCARdipine, 1-15 mg/hr  [START ON 4/6/2024] phenylephine,  mcg/min, Last Rate: Stopped (04/04/24 0319)          Labs:    CBC    Recent Labs     04/03/24  1723 04/04/24  0505   WBC 9.09 10.85*   HGB 11.6 11.1*   HCT 35.5 33.4*    230     BMP    Recent Labs     04/03/24  1723 04/04/24  0505   SODIUM 140 138   K 3.5 5.0   * 112*   CO2 23 21   AGAP 6 5   BUN 11 11   CREATININE 0.94 0.86   CALCIUM 8.3* 8.3*       Coags    Recent Labs     04/04/24  0505   INR 1.00   PTT 34        Additional Electrolytes  Recent Labs     04/03/24  1723 04/04/24  0505   MG 1.8* 2.4   PHOS 3.5 3.2   CAIONIZED 1.13  --           Blood Gas    No recent results  No recent results LFTs  No recent results    Infectious  No recent results  Glucose  Recent Labs     04/03/24  1723 04/04/24  0505   GLUC 121 107               Sukumar Guzman MD

## 2024-04-04 NOTE — QUICK NOTE
Post Op Check: Left CEA    Patients pain is well controlled. They denied any nausea, chest pain, or shortness of breath. Denied numbness, tingling, loss of sensation. Currently hypotensive w/ MAPs in the 60s - however she is asymptomatic     General: NAD  HENT: NCAT MMM  Neck: supple, no JVD; incision c/d/I and well approximated with minimal swelling  CV: nl rate  Lungs: nl wob. No resp distress  ABD: Soft, nontender, nondistended  Extrem: No CCE  Neuro: AAOx3     Plan:  Diet Regular; Regular House  Resume Plavix in AM  BP goals: systolic 100-160  Neuro exams per ICU  Continue to monitor  Pain and nausea control PRN  OOB as able  Appreciate ICU level care     Dany Saini, DO  Surgery, PGY-2

## 2024-04-04 NOTE — PLAN OF CARE
Problem: Prexisting or High Potential for Compromised Skin Integrity  Goal: Skin integrity is maintained or improved  Description: INTERVENTIONS:  - Identify patients at risk for skin breakdown  - Assess and monitor skin integrity  - Assess and monitor nutrition and hydration status  - Monitor labs   - Assess for incontinence   - Turn and reposition patient  - Assist with mobility/ambulation  - Relieve pressure over bony prominences  - Avoid friction and shearing  - Provide appropriate hygiene as needed including keeping skin clean and dry  - Evaluate need for skin moisturizer/barrier cream  - Collaborate with interdisciplinary team   - Patient/family teaching  - Consider wound care consult   Outcome: Progressing     Problem: PAIN - ADULT  Goal: Verbalizes/displays adequate comfort level or baseline comfort level  Description: Interventions:  - Encourage patient to monitor pain and request assistance  - Assess pain using appropriate pain scale  - Administer analgesics based on type and severity of pain and evaluate response  - Implement non-pharmacological measures as appropriate and evaluate response  - Consider cultural and social influences on pain and pain management  - Notify physician/advanced practitioner if interventions unsuccessful or patient reports new pain  Outcome: Progressing     Problem: INFECTION - ADULT  Goal: Absence or prevention of progression during hospitalization  Description: INTERVENTIONS:  - Assess and monitor for signs and symptoms of infection  - Monitor lab/diagnostic results  - Monitor all insertion sites, i.e. indwelling lines, tubes, and drains  - Monitor endotracheal if appropriate and nasal secretions for changes in amount and color  - [place] appropriate cooling/warming therapies per order  - Administer medications as ordered  - Instruct and encourage patient and family to use good hand hygiene technique  - Identify and instruct in appropriate isolation precautions for  identified infection/condition  Outcome: Progressing  Goal: Absence of fever/infection during neutropenic period  Description: INTERVENTIONS:  - Monitor WBC    Outcome: Progressing     Problem: SAFETY ADULT  Goal: Patient will remain free of falls  Description: INTERVENTIONS:  - Educate patient/family on patient safety including physical limitations  - Instruct patient to call for assistance with activity   - Consult OT/PT to assist with strengthening/mobility   - Keep Call bell within reach  - Keep bed low and locked with side rails adjusted as appropriate  - Keep care items and personal belongings within reach  - Initiate and maintain comfort rounds  - Make Fall Risk Sign visible to staff  - Apply yellow socks and bracelet for high fall risk patients  - Consider moving patient to room near nurses station  Outcome: Progressing  Goal: Maintain or return to baseline ADL function  Description: INTERVENTIONS:  -  Assess patient's ability to carry out ADLs; assess patient's baseline for ADL function and identify physical deficits which impact ability to perform ADLs (bathing, care of mouth/teeth, toileting, grooming, dressing, etc.)  - Assess/evaluate cause of self-care deficits   - Assess range of motion  - Assess patient's mobility; develop plan if impaired  - Assess patient's need for assistive devices and provide as appropriate  - Encourage maximum independence but intervene and supervise when necessary  - Involve family in performance of ADLs  - Assess for home care needs following discharge   - Consider OT consult to assist with ADL evaluation and planning for discharge  - Provide patient education as appropriate  Outcome: Progressing     Problem: DISCHARGE PLANNING  Goal: Discharge to home or other facility with appropriate resources  Description: INTERVENTIONS:  - Identify barriers to discharge w/patient and caregiver  - Arrange for needed discharge resources and transportation as appropriate  - Identify  discharge learning needs (meds, wound care, etc.)  - Arrange for interpretive services to assist at discharge as needed  - Refer to Case Management Department for coordinating discharge planning if the patient needs post-hospital services based on physician/advanced practitioner order or complex needs related to functional status, cognitive ability, or social support system  Outcome: Progressing

## 2024-04-04 NOTE — NURSING NOTE
RN discussed AVS with pt. Education provided regarding quitting smoking. IV x2 removed. Pt has no further questions/comments/complaints at this time.

## 2024-04-04 NOTE — CONSULTS
Consultation    Nephrology   Jackelinmadan Song 84 y.o. female MRN: [id number]  Unit/Bed#: ICU 07 Encounter: [id number]    History of Present Illness   Physician Requesting Consult: Mango Gonzalez*  Reason for Consult : -Perioperative optimization to reduce incidence for acute kidney injury    ASSESSMENT:   84 y.o.  female with medical history of hypertension, hyperlipidemia, COPD, bilateral carotid artery stenosis status post right CEA in September 2022, CKD stage II/III AA and peripheral arterial disease who was admitted for elective left CEA on 4/3/2024. Nephrology has been consulted for perioperative optimization to reduce incidence for acute kidney injury.    Perioperative optimization to reduce incidence for acute kidney injury/CKD stage II/III AA:   At risk for TAMMY most likely secondary to ischemic injury secondary to hemodynamic the patient intraoperatively in light of underlying comorbidities  After review of records it appears that the patient has a baseline Creatinine of 0.8-1.1 mg/dL.  Admission creatinine of 0.94 mg/dL on 4/3/24 postoperatively.  Creatinine today is at 0.86 mg/dL remains stable.  check BMP, magnesium, phosphorus in a.m. if still in the hospital  Hold further IV fluids  Place on a renal diet when allowed diet order.   Strict I/O.  Daily weights  Urinary retention protocol  Avoid nephrotoxins, adjust meds to appropriate GFR.  Likely has underlying CKD secondary to age-related nephron loss plus some component of ischemic renovascular disease underlying mild  Outpatient for nephrology follows up with no nephrologist  Stable for discharge from renal standpoint medically cleared    H&H/anemia:  most recent hemoglobin at 11.1 g/dL  Maintain hemoglobin greater than 8 grams/deciliter    Acid-base electrolytes:  Borderline elevated potassium levels 5.0 mEq advised patient low potassium diet.  Hypomagnesemia magnesium 1.8 supplemented recheck at 2.4 stable resolved      Blood  pressure/normotensive:   Optimize hemodynamic status to avoid delay in renal recovery.  Maintain MAP > 65mmHg  Avoid BP fluctuations.  Home medications: none  Current medications: None    Other medical problems:  PAD:  Management per primary team.   Carotid artery stenosis:  Management per primary team.  Follow-up with vascular status post left CEA on 4/3/2024.      Plan below is what was discussed with the primary team that they agree with:  check BMP, magnesium, phosphorus in a.m. if still in the hospital  Hold further IV fluids  Stable for discharge from renal standpoint medically cleared  Follow low potassium diet    Thanks for the consult  Will continue to follow  Please call with questions/ concerns.      Tricia Albarado MD, FASN, 4/4/2024, 10:36 AM          HISTORY OF PRESENT ILLNESS:   Jackelin Song is a 84 y.o.  female with medical history of hypertension, hyperlipidemia, COPD, bilateral carotid artery stenosis status post right CEA in September 2022, CKD stage II/III AA and peripheral arterial disease who was admitted for elective left CEA on 4/3/2024. Nephrology has been consulted for perioperative optimization to reduce incidence for acute kidney injury.  Patient seen and examined in the ICU earlier this a.m. hemodynamically stable remains afebrile review of record shows baseline creatinine 0.8 to 1.1 mg/dL admitted with creatinine postprocedure 0.94 mg/dL creatinine today 0.86 mg/dL.  Patient feels well has no complaints.  No history of TAMMY needing dialysis no history of kidney stones never seen a nephrologist before.  History obtained from chart review and the patient    Inpatient consult to Nephrology  Consult performed by: Tricia Albarado MD  Consult ordered by: Harry Zamudio MD          Review of Systems   Constitutional:  Negative for chills and fever.   HENT:  Negative for congestion.    Respiratory:  Negative for cough, shortness of breath and wheezing.    Cardiovascular:  Negative for leg  swelling.   Gastrointestinal:  Negative for abdominal pain.   Genitourinary:  Negative for dysuria.   Musculoskeletal:  Negative for back pain.   Neurological:  Negative for headaches.   Psychiatric/Behavioral:  Negative for agitation and confusion.    All other systems reviewed and are negative.       Historical Information   Patient Active Problem List   Diagnosis    Smoker    Peripheral arterial occlusive disease (HCC)    Proximal humerus fracture    Atherosclerosis of native arteries of extremities with intermittent claudication, unspecified extremity (HCC)    COPD (chronic obstructive pulmonary disease) ([facility])    Degeneration of intervertebral disc    Hyperlipidemia    Iliac artery stenosis, bilateral (HCC)    Carotid artery stenosis without cerebral infarction, left    Osteoarthritis    Osteopenia    Varicose veins of right lower extremity with pain    Vitamin D deficiency    Age-related nuclear cataract of both eyes    Open angle with borderline findings, low risk, bilateral    Disorder of arteries and arterioles, unspecified ([facility])    Stricture of artery ([facility])    CKD (chronic kidney disease) stage 3, GFR 30-59 ml/min ([facility])    Fall    Embolism and thrombosis of arteries of the lower extremities ([facility])    Preoperative cardiovascular examination    Severe dizziness    Vertigo     Past Medical History:   Diagnosis Date    Back pain     Carotid artery disease (HCC)     Colon polyp     DVT (deep venous thrombosis) ([facility])     Food allergy     chocholate    Hyperlipidemia     Neck pain     Peripheral arterial occlusive disease (HCC)     Right humeral fracture     right poximal    Smoking     Wears dentures     full upper, partial lower    Wears glasses      Past Surgical History:   Procedure Laterality Date    APPENDECTOMY      BREAST SURGERY      gilberto    COLONOSCOPY      HEMORRHOID SURGERY      ILIAC ARTERY STENT      pt states she has many stents    IR AORTAGRAM WITH RUN-OFF  08/26/2019    HI ARTHROPLASTY GLENOHUMERAL  JOINT TOTAL SHOULDER Right 12/08/2016    Procedure: ARTHROPLASTY SHOULDER REVERSE;  Surgeon: Harry Curry MD;  Location: [facility];  Service: Orthopedics    MI TEAEC W/PATCH GRF CAROTID VERTB SUBCLAV NECK INC Right 09/28/2022    Procedure: ENDARTERECTOMY ARTERY CAROTID w/bovine patch completion duplex imaging;  Surgeon: Hussein Smalls MD;  Location: [facility];  Service: Vascular    MI TEAEC W/PATCH GRF CAROTID VERTB SUBCLAV NECK INC Left 4/3/2024    Procedure: ENDARTERECTOMY ARTERY CAROTID WITH BOVINE PATCH ANGIOPLASTY;  Surgeon: Mango Crockett MD;  Location: [facility];  Service: Vascular    THROMBOENDARTERECTOMY      TONSILLECTOMY       Social History   Social History     Substance and Sexual Activity   Alcohol Use Not Currently    Comment: occassionly     Social History     Substance and Sexual Activity   Drug Use No     Social History     Tobacco Use   Smoking Status Every Day    Current packs/day: 0.50    Average packs/day: 0.5 packs/day for 64.0 years (32.0 ttl pk-yrs)    Types: Cigarettes   Smokeless Tobacco Never   Tobacco Comments    Trying to quit  last smoked 4.3.24 0700     Family History   Problem Relation Age of Onset    No Known Problems Mother     No Known Problems Father        Meds/Allergies   current meds:   Current Facility-Administered Medications   Medication Dose Route Frequency    acetaminophen (TYLENOL) tablet 975 mg  975 mg Oral Q6H Replaced by [facility]    calcium carbonate (TUMS) chewable tablet 500 mg  500 mg Oral Daily PRN    chlorhexidine (PERIDEX) 0.12 % oral rinse 15 mL  15 mL Mouth/Throat Q12H Replaced by [facility]    clopidogrel (PLAVIX) tablet 75 mg  75 mg Oral Daily    heparin (porcine) subcutaneous injection 5,000 Units  5,000 Units Subcutaneous Q8H Replaced by [facility]    labetalol (NORMODYNE) injection 5 mg  5 mg Intravenous Q15 Min PRN    And    hydrALAZINE (APRESOLINE) injection 15 mg  15 mg Intravenous Q15 Min PRN    And    niCARdipine (CARDENE) 25 mg (STANDARD CONCENTRATION) in sodium chloride 0.9% 250  mL  1-15 mg/hr Intravenous Continuous PRN    melatonin tablet 6 mg  6 mg Oral HS    oxyCODONE (ROXICODONE) IR tablet 5 mg  5 mg Oral Q4H PRN    [START ON 4/6/2024] phenylephrine (CHRISTOPHER-SYNEPHRINE) 50 mg (STANDARD CONCENTRATION) in sodium chloride 0.9% 250 mL   mcg/min Intravenous Continuous PRN    pravastatin (PRAVACHOL) tablet 40 mg  40 mg Oral Daily With Dinner    and PTA meds:   Prior to Admission Medications   Prescriptions Last Dose Informant Patient Reported? Taking?   Ascorbic Acid (vitamin C) 1000 MG tablet  Self Yes Yes   Sig: Take 1,000 mg by mouth daily   calcium carbonate (Calcium Antacid Ultra Max St) 1000 MG chewable tablet   Yes Yes   Sig: Chew 1,000 mg 3 (three) times a day   Patient not taking: Reported on 3/28/2024   cholecalciferol (VITAMIN D3) 25 mcg (1,000 units) tablet   Yes Yes   Sig: Take 1,000 Units by mouth daily   clopidogrel (PLAVIX) 75 mg tablet 4/2/2024 at 2100  No Yes   Sig: TAKE 1 TABLET BY MOUTH EVERY DAY   simvastatin (ZOCOR) 40 mg tablet 4/2/2024 at 2100  No Yes   Sig: TAKE 1 TABLET BY MOUTH EVERYDAY AT BEDTIME      Facility-Administered Medications: None       Scheduled Meds:  Current Facility-Administered Medications   Medication Dose Route Frequency Provider Last Rate    acetaminophen  975 mg Oral Q6H [facility] Sae Marie DO      calcium carbonate  500 mg Oral Daily PRN Deiys Ling PA-C      chlorhexidine  15 mL Mouth/Throat Q12H [facility] Sukumar Guzman MD      clopidogrel  75 mg Oral Daily Sae Marie DO      heparin (porcine)  5,000 Units Subcutaneous Q8H [facility] Sae Marie DO      labetalol  5 mg Intravenous Q15 Min PRN Sae Marie DO      And    hydrALAZINE  15 mg Intravenous Q15 Min PRN Sae Marie DO      And    niCARdipine  1-15 mg/hr Intravenous Continuous PRN Sae Marie DO      melatonin  6 mg Oral HS Deisy Ling PA-C      oxyCODONE  5 mg Oral Q4H PRN Sae Marie DO      [START ON 4/6/2024] phenylephine   " mcg/min Intravenous Continuous PRN Sae Marie DO Stopped (24)    pravastatin  40 mg Oral Daily With Dinner Sae Marie DO         PRN Meds:.  calcium carbonate    labetalol **AND** hydrALAZINE **AND** niCARdipine    oxyCODONE    [COMPLETED] sodium chloride **FOLLOWED BY** [START ON 2024] phenylephine    Continuous Infusions:niCARdipine, 1-15 mg/hr  [START ON 2024] phenylephine,  mcg/min, Last Rate: Stopped (24)        Allergies   Allergen Reactions    Celecoxib GI Intolerance    Other Rash     Adhesive tape-skin tear    Cilostazol Edema     rash    Sutures  [Suture] Other (See Comments)     Doesn't tolerate dissolving sutures    Wound Dressing Adhesive      Other reaction(s): Itching         Invasive Devices:     Invasive Devices       Peripheral Intravenous Line  Duration             Peripheral IV 24 Dorsal (posterior);Right Hand 1 day    Peripheral IV 24 Right Wrist <1 day                      PHYSICAL EXAM  /62   Pulse 76   Temp 97.6 °F (36.4 °C) (Oral)   Resp 21   Ht 5' 1\" (1.549 m)   Wt 73 kg (160 lb 15 oz)   SpO2 96%   BMI 30.41 kg/m²   Temp (24hrs), Av.6 °F (36.4 °C), Min:97 °F (36.1 °C), Max:97.8 °F (36.6 °C)        Intake/Output Summary (Last 24 hours) at 2024 1036  Last data filed at 2024 0701  Gross per 24 hour   Intake 4842.23 ml   Output 750 ml   Net 4092.23 ml       I/O last 24 hours:  In: 4842.2 [P.O.:480; I.V.:3162.2; IV Piggyback:1200]  Out: 750 [Urine:600; Blood:150]          Current Weight: Weight - Scale: 73 kg (160 lb 15 oz)  First Weight: Weight - Scale: 65.8 kg (145 lb)  Physical Exam  Vitals and nursing note reviewed.   Constitutional:       General: She is not in acute distress.     Appearance: Normal appearance. She is obese. She is not ill-appearing, toxic-appearing or diaphoretic.   HENT:      Head: Normocephalic and atraumatic.      Mouth/Throat:      Mouth: Mucous membranes are moist.      " "Pharynx: No oropharyngeal exudate.   Eyes:      General: No scleral icterus.  Neck:      Comments: Left carotid area with ecchymosis  Cardiovascular:      Rate and Rhythm: Normal rate.   Pulmonary:      Effort: No respiratory distress.      Breath sounds: Normal breath sounds. No stridor. No wheezing.   Abdominal:      General: There is no distension.      Palpations: Abdomen is soft.      Tenderness: There is no abdominal tenderness.   Musculoskeletal:         General: No swelling.      Cervical back: Normal range of motion. No rigidity.   Skin:     Coloration: Skin is not jaundiced.   Neurological:      General: No focal deficit present.      Mental Status: She is alert and oriented to person, place, and time.   Psychiatric:         Mood and Affect: Mood normal.         Behavior: Behavior normal.           LABORATORY:    Results from last 7 days   Lab Units 04/04/24  0505 04/03/24  1723   WBC Thousand/uL 10.85* 9.09   HEMOGLOBIN g/dL 11.1* 11.6   HEMATOCRIT % 33.4* 35.5   PLATELETS Thousands/uL 230 215   POTASSIUM mmol/L 5.0 3.5   CHLORIDE mmol/L 112* 111*   CO2 mmol/L 21 23   BUN mg/dL 11 11   CREATININE mg/dL 0.86 0.94   CALCIUM mg/dL 8.3* 8.3*   MAGNESIUM mg/dL 2.4 1.8*   PHOSPHORUS mg/dL 3.2 3.5      rest all reviewed    RADIOLOGY:  No orders to display     Rest all reviewed    Portions of the record may have been created with voice recognition software. Occasional wrong word or \"sound a like\" substitutions may have occurred due to the inherent limitations of voice recognition software. Read the chart carefully and recognize, using context, where substitutions have occurred.If you have any questions, please contact the dictating provider.    "

## 2024-04-04 NOTE — PROGRESS NOTES
"P\Progress Note - Vascular Surgery   Jackelin Song 84 y.o. female MRN: [id number]  Unit/Bed#: ICU 07 Encounter: [id number]    Assessment:  84-year-old female s/p left carotid endarterectomy on 4/3    Hypotensive at times last night, now hemodynamically stable and afebrile off pressors  Plan:  Diet Regular; Regular House  Resume Plavix   BP goals: systolic 100-160  Neuro exams per ICU  Continue to monitor  Pain and nausea control PRN  OOB as able  Tentative dispo today 4/4    Subjective/Objective     Subjective: Patient seen and examined.  NAEO.  Pain well-controlled at this time.  She is tolerating diet.  Denies numbness, tingling, loss of sensation and/or change in motor control.  She is requesting to go home.    Objective:    Blood pressure 114/59, pulse 62, temperature 97.6 °F (36.4 °C), temperature source Oral, resp. rate (!) 29, height 5' 1\" (1.549 m), weight 73 kg (160 lb 15 oz), SpO2 95%, not currently breastfeeding.,Body mass index is 30.41 kg/m².      Intake/Output Summary (Last 24 hours) at 4/4/2024 0816  Last data filed at 4/4/2024 0701  Gross per 24 hour   Intake 4842.23 ml   Output 750 ml   Net 4092.23 ml       Invasive Devices       Peripheral Intravenous Line  Duration             Peripheral IV 04/03/24 Dorsal (posterior);Right Hand <1 day    Peripheral IV 04/03/24 Right Wrist <1 day              Arterial Line  Duration             Arterial Line 04/03/24 Right Radial <1 day                    Physical Exam:   General: NAD  HENT: NCAT MMM  Neck: supple, no JVD; incision c/d/I and well approximated with minimal swelling  CV: nl rate  Lungs: nl wob. No resp distress  ABD: Soft, nontender, nondistended  Extrem: No CCE  Neuro: AAOx3       "

## 2024-04-05 NOTE — UTILIZATION REVIEW
NOTIFICATION OF ADMISSION DISCHARGE   This is a Notification of Discharge from [facility]. Please be advised that this patient has been discharge from our facility. Below you will find the admission and discharge date and time including the patient’s disposition.   UTILIZATION REVIEW CONTACT:  Aylin Medina MA  Utilization   Network Utilization Review Department  Phone: [phone] x carefully listen to the prompts. All voicemails are confidential.  Email: [email or web address]     ADMISSION INFORMATION  PRESENTATION DATE: 4/3/2024  9:17 AM  OBERVATION ADMISSION DATE:   INPATIENT ADMISSION DATE: 4/3/24  3:09 PM   DISCHARGE DATE: 4/4/2024 11:24 AM   DISPOSITION:Home/Self Care    Network Utilization Review Department  ATTENTION: Please call with any questions or concerns to [phone] and carefully listen to the prompts so that you are directed to the right person. All voicemails are confidential.   For Discharge needs, contact Care Management DC Support Team at [phone] opt. 2  Send all requests for admission clinical reviews, approved or denied determinations and any other requests to dedicated fax number below belonging to the campus where the patient is receiving treatment. List of dedicated fax numbers for the Facilities:  FACILITY NAME UR FAX NUMBER   ADMISSION DENIALS (Administrative/Medical Necessity) [phone]   DISCHARGE SUPPORT TEAM ([facility]) [phone]   PARENT CHILD HEALTH (Maternity/NICU/Pediatrics) [phone]   [facility] [phone]   [facility] [phone]   [facility] [phone]   [facility] [phone]   [facility] [phone]   [facility] [phone]   [facility] [phone]   [facility] [phone]    [facility] [phone]   [facility] [phone]   [facility] [phone]   [facility] [phone]

## 2024-04-08 ENCOUNTER — TELEPHONE (OUTPATIENT)
Dept: VASCULAR SURGERY | Facility: CLINIC | Age: 84
End: 2024-04-08

## 2024-04-08 NOTE — TELEPHONE ENCOUNTER
Vascular Nurse Navigator Post Op Call    Procedure: Left carotid endarterectomy with bovine patch angioplasty.     Date of Procedure: 4/3/24    Surgeon: Mango Crockett MD     Discharge Date: 4/4/24    Discharge Disposition: Home    Change in Vision?: No    Change in Speech?: No    Weakness?: No    Uncontrolled Pain?: No    Hoarseness?: No    Trouble Swallowing?: No    Incision Concerns?: No    Anticoagulation pt was discharged on post op?: Clopidogrel (Plavix)    Statin pt was discharged on post op?: Zocor (simvastatin)    Bleeding?: No    Fever/chills?: No      Reviewed discharge instructions and incision care with patient.      NEXT OFFICE VISIT SCHEDULED:  4/11/24 at 2:30 pm with GABBY Pearce at [facility]    Transportation Available?: Yes - Lyft ridmadai scheduled      Any further questions/concerns?    Patient stated that she is doing good since discharge.  She stated that she has a sore throat, but denies any issues with eating, drinking or swallowing.  Reviewed incision care with her - wash daily with soap. Reviewed discharge medications - Plavix.  All questions answered.  No concerns expressed at this time.

## 2024-04-09 ENCOUNTER — OFFICE VISIT (OUTPATIENT)
Dept: FAMILY MEDICINE CLINIC | Facility: CLINIC | Age: 84
End: 2024-04-09
Payer: COMMERCIAL

## 2024-04-09 VITALS
DIASTOLIC BLOOD PRESSURE: 64 MMHG | WEIGHT: 148.2 LBS | TEMPERATURE: 98 F | OXYGEN SATURATION: 97 % | SYSTOLIC BLOOD PRESSURE: 140 MMHG | BODY MASS INDEX: 27.98 KG/M2 | HEIGHT: 61 IN | RESPIRATION RATE: 16 BRPM | HEART RATE: 64 BPM

## 2024-04-09 DIAGNOSIS — I77.9 PERIPHERAL ARTERIAL OCCLUSIVE DISEASE (HCC): ICD-10-CM

## 2024-04-09 DIAGNOSIS — E55.9 VITAMIN D DEFICIENCY: ICD-10-CM

## 2024-04-09 DIAGNOSIS — I65.22 CAROTID ARTERY STENOSIS WITHOUT CEREBRAL INFARCTION, LEFT: Primary | ICD-10-CM

## 2024-04-09 DIAGNOSIS — E78.00 PURE HYPERCHOLESTEROLEMIA: ICD-10-CM

## 2024-04-09 DIAGNOSIS — J44.9 CHRONIC OBSTRUCTIVE PULMONARY DISEASE, UNSPECIFIED COPD TYPE (HCC): ICD-10-CM

## 2024-04-09 PROCEDURE — 99496 TRANSJ CARE MGMT HIGH F2F 7D: CPT | Performed by: FAMILY MEDICINE

## 2024-04-09 NOTE — PROGRESS NOTES
Assessment/Plan:  Patient to continue present treatment.  Instructed to follow low-fat and a low salt diet and get regular aerobic exercise walking as tolerated.  Recommend patient discontinue smoking.  Follow-up with vascular surgery on 4/11/2024 as scheduled.  Return to the office in 6 months.  No problem-specific Assessment & Plan notes found for this encounter.    TCM Call       Date and time call was made  4/4/2024  2:05 PM    Hospital care reviewed  Records reviewed    Patient was hospitialized at  [facility]    Date of Admission  04/03/24    Date of discharge  04/04/24    Diagnosis  Carotid stenosis, bilateral    Disposition  Home    Were the patients medications reviewed and updated  No    Current Symptoms  None    Incisional pain severity  Moderate          TCM Call       Post hospital issues  None    Should patient be enrolled in anticoag monitoring?  Yes    Scheduled for follow up?  No    Patients specialists  Nephrologist; Other (comment)    Other specialists names  Vascular Surgery    Other specialists contcat #  [phone]    Did you obtain your prescribed medications  Yes    Do you need help managing your prescriptions or medications  No    Is transportation to your appointment needed  No    I have advised the patient to call PCP with any new or worsening symptoms  Olga Mckoy MA    Living Arrangements  Family members    Support System  Family    The type of support provided  Emotional; Financial; Physical    Do you have social support  Yes, as much as I need    Are you recieving any outpatient services  No    Are you recieving home care services  No    Are you using any community resources  No    Current waiver services  No    Have you fallen in the last 12 months  No    Interperter language line needed  No    Counseling  Patient               Diagnoses and all orders for this visit:    Carotid artery stenosis without cerebral infarction, left    Peripheral arterial occlusive disease  (HCC)    Chronic obstructive pulmonary disease, unspecified COPD type (HCC)    Pure hypercholesterolemia    Vitamin D deficiency          Subjective:      Patient ID: Jackelin Song is a 84 y.o. female.    Patient is being seen in follow-up from recent hospitalization at [facility] from 4/3/2024 until 4/4/2024 status post left carotid endarterectomy.  Patient admits to itching along incision.  Patient has a follow-up appoint with vascular surgery on 4/11/2024.  Patient has been feeling well overall.  She remains fairly active caring for her daughter with terminal cancer.  Patient continues to smoke less than half a pack of cigarettes daily.       Hyperlipidemia  This is a chronic problem. Recent lipid tests were reviewed and are normal. She has no history of diabetes or hypothyroidism. Associated symptoms include leg pain. Pertinent negatives include no chest pain, focal sensory loss, focal weakness, myalgias or shortness of breath. Current antihyperlipidemic treatment includes statins. The current treatment provides significant improvement of lipids. There are no compliance problems.  Risk factors for coronary artery disease include dyslipidemia, post-menopausal and family history.       The following portions of the patient's history were reviewed and updated as appropriate: allergies, current medications, past family history, past medical history, past social history, past surgical history, and problem list.    Review of Systems   Constitutional:  Negative for activity change, appetite change, chills, diaphoresis, fatigue, fever and unexpected weight change.   HENT: Negative.     Eyes: Negative.    Respiratory:  Positive for cough. Negative for chest tightness, shortness of breath and wheezing.    Cardiovascular:  Negative for chest pain, palpitations and leg swelling.   Gastrointestinal:  Negative for abdominal pain, blood in stool, constipation, diarrhea, nausea and vomiting.   Endocrine: Negative  "for cold intolerance and heat intolerance.   Genitourinary:  Negative for difficulty urinating, dysuria, frequency, hematuria and urgency.   Musculoskeletal:  Positive for back pain. Negative for arthralgias, gait problem, joint swelling, myalgias, neck pain and neck stiffness.   Skin: Negative.    Neurological:  Positive for light-headedness. Negative for dizziness, focal weakness, syncope, weakness and headaches.   Hematological:  Negative for adenopathy. Does not bruise/bleed easily.   Psychiatric/Behavioral:  Negative for confusion, decreased concentration, dysphoric mood and sleep disturbance. The patient is nervous/anxious.          Objective:      /64   Pulse 64   Temp 98 °F (36.7 °C)   Resp 16   Ht 5' 1\" (1.549 m)   Wt 67.2 kg (148 lb 3.2 oz)   SpO2 97%   BMI 28.00 kg/m²          Physical Exam  Constitutional:       General: She is not in acute distress.     Appearance: Normal appearance.   HENT:      Head: Normocephalic.      Mouth/Throat:      Mouth: Mucous membranes are moist.   Eyes:      General: No scleral icterus.     Conjunctiva/sclera: Conjunctivae normal.   Neck:      Vascular: No carotid bruit.   Cardiovascular:      Rate and Rhythm: Normal rate and regular rhythm.   Pulmonary:      Effort: Pulmonary effort is normal.      Breath sounds: Normal breath sounds.   Abdominal:      Palpations: Abdomen is soft.      Tenderness: There is no abdominal tenderness.   Musculoskeletal:      Cervical back: Neck supple.      Right lower leg: No edema.      Left lower leg: No edema.   Lymphadenopathy:      Cervical: No cervical adenopathy.   Skin:     General: Skin is warm and dry.   Neurological:      General: No focal deficit present.      Mental Status: She is alert and oriented to person, place, and time.   Psychiatric:         Mood and Affect: Mood normal.         Behavior: Behavior normal.         Thought Content: Thought content normal.         Judgment: Judgment normal.           "

## 2024-04-10 NOTE — PROGRESS NOTES
Assessment/Plan:    S/P carotid endarterectomy  84 year-old female current smoker (1/2 PPD), with PMH COPD, HLD, HTN, bilateral carotid artery stenosis s/p R CEA 9/28/22 (Smalls), CKD 3, PAD, and aortoiliac occlusive disease. Patient is presenting to the vascular surgery office for post op appointment. Pt is now s/p L CEA by .    -Denies symptoms of numbness/ tingling/ weakness on one side of the body, facial droop, slurred speech or blindness in one eye.   -Presents to the office w/o complaints of pain at the L carotid incision. Has not required use of opioid/ prescribed pain medication.   -L carotid incision site is clean, dry and well approximated with surgical glue in place.   -Pt has concerns about allergy to dissolving sutures and surgical glue.  -No drainage, no erythremia, denies fever/ chills.  -Reports rash to the upper left chest area that is itching, has been applying cortisone cream to the area, advised against this and advised benadryl. Pt reports she is reluctant to take more medications.   -Increasing physical activity daily.   -Discussed post-op incision care and post-op restrictions. Educated on post-op follow up ultrasound surveillance and monitoring. All questions answered.   -She reports taking plavix 'sometimes' and pt was educated on the importance of daily compliance with this medication. She verbalized understanding and will continue to take the medication daily.           Plan  -Complete carotid ultrasound in 3 months and return to the office for review with your vascular surgeon.  -Continue to cleanse L neck incision site daily as per discharge instructions.   -Continue Plavix daily.   -Continue Rosuvastatin daily as prescribed by PCP.  -Start taking benadryl at night as per bottle instruction for rash on chest.  -Call 911/ Go to the ER with any S/S of TIA/ CVA.  -Call the office with any surgical site swelling, discoloration, fever/ chills.    -CC        Diagnoses  and all orders for this visit:    Carotid artery stenosis without cerebral infarction, left  -     VAS carotid complete study; Future    S/P carotid endarterectomy          Subjective:      Patient ID: Jackelin Song is a 84 y.o. female.    Patient is s/p L CEA on 4/3/24 (Keira) and presents today for post-op visit. Reports swallowing difficulty, but has been improving. Denies sore throat and voice change.     84 year-old female current smoker (1/2 PPD), with PMH COPD, HLD, HTN, bilateral carotid artery stenosis s/p R CEA 9/28/22 (Slim), CKD 3, PAD, and aortoiliac occlusive disease. Patient is presenting to the vascular surgery office for post op appointment. Pt is now s/p L CEA by .  -Pt reports numbness in the left neck that extends up to the left ear area, can be painful at times. She does report gradual improvement of symptoms.   -Denies symptoms of numbness/ tingling/ weakness on one side of the body, facial droop, slurred speech or blindness in one eye.   -Pt presents to the office with multiple concerns about dissolvable sutures, states she is allergic and the sutures will not dissolve. She additionally has concerns about surgical glue on her incision site. Discussed if rash fails to improve or continues to worsen will consider steroid PO medication. Pt is reluctant to take any additional medications.  -Reports that she takes plavix 'sometimes' and 'sometimes not'. Pt was educated on the importance of being compliant with plavix especially during post-op period. She verbalized understanding.   -Pt continues to smoke.          The following portions of the patient's history were reviewed and updated as appropriate: allergies, current medications, past family history, past medical history, past social history, past surgical history, and problem list.    Review of Systems   Constitutional: Negative.    HENT: Negative.     Eyes: Negative.    Respiratory:  Positive for cough. Negative for  "shortness of breath.    Cardiovascular: Negative.  Negative for chest pain.   Gastrointestinal: Negative.    Endocrine: Negative.    Genitourinary: Negative.    Musculoskeletal: Negative.    Skin: Negative.    Allergic/Immunologic: Negative.    Neurological: Negative.    Hematological: Negative.    Psychiatric/Behavioral: Negative.           Objective:      /80 (BP Location: Left arm, Patient Position: Sitting)   Pulse 72   Ht 5' 1\" (1.549 m)   Wt 66.2 kg (146 lb)   BMI 27.59 kg/m²          Physical Exam  Vitals and nursing note reviewed.   Constitutional:       General: She is not in acute distress.     Appearance: Normal appearance. She is normal weight. She is not ill-appearing.   HENT:      Head: Normocephalic and atraumatic.   Neck:      Vascular: No carotid bruit.   Cardiovascular:      Rate and Rhythm: Normal rate and regular rhythm.      Pulses: Normal pulses.           Radial pulses are 2+ on the right side and 2+ on the left side.      Heart sounds: No murmur heard.  Pulmonary:      Effort: Pulmonary effort is normal. No respiratory distress.      Breath sounds: Normal breath sounds.   Musculoskeletal:         General: Tenderness present.      Cervical back: Normal range of motion. Tenderness present.   Skin:     General: Skin is warm and dry.      Findings: Bruising present.   Neurological:      General: No focal deficit present.      Mental Status: She is alert and oriented to person, place, and time.      Sensory: No sensory deficit.      Comments: Numbness surrounding the L neck incision site   Psychiatric:         Mood and Affect: Mood normal.         Behavior: Behavior normal.         I have reviewed and made appropriate changes to the review of systems input by the medical assistant.    Vitals:    04/11/24 1425   BP: 140/80   BP Location: Left arm   Patient Position: Sitting   Pulse: 72   Weight: 66.2 kg (146 lb)   Height: 5' 1\" (1.549 m)       Patient Active Problem List   Diagnosis    " Smoker    Peripheral arterial occlusive disease (HCC)    Proximal humerus fracture    Atherosclerosis of native arteries of extremities with intermittent claudication, unspecified extremity (HCC)    COPD (chronic obstructive pulmonary disease) (HCC)    Degeneration of intervertebral disc    Hyperlipidemia    Iliac artery stenosis, bilateral (HCC)    Carotid artery stenosis without cerebral infarction, left    Osteoarthritis    Osteopenia    Varicose veins of right lower extremity with pain    Vitamin D deficiency    Age-related nuclear cataract of both eyes    Open angle with borderline findings, low risk, bilateral    Disorder of arteries and arterioles, unspecified (HCC)    Stricture of artery (HCC)    CKD (chronic kidney disease) stage 3, GFR 30-59 ml/min (HCC)    Fall    Embolism and thrombosis of arteries of the lower extremities ([facility])    Preoperative cardiovascular examination    Severe dizziness    Vertigo    S/P carotid endarterectomy       Past Surgical History:   Procedure Laterality Date    APPENDECTOMY      BREAST SURGERY      gilberto    COLONOSCOPY      HEMORRHOID SURGERY      ILIAC ARTERY STENT      pt states she has many stents    IR AORTAGRAM WITH RUN-OFF  08/26/2019    OH ARTHROPLASTY GLENOHUMERAL JOINT TOTAL SHOULDER Right 12/08/2016    Procedure: ARTHROPLASTY SHOULDER REVERSE;  Surgeon: Harry Curry MD;  [facility];  Service: Orthopedics    OH TEAEC W/PATCH GRF CAROTID VERTB SUBCLAV NECK INC Right 09/28/2022    Procedure: ENDARTERECTOMY ARTERY CAROTID w/bovine patch completion duplex imaging;  Surgeon: Hussein Smalls MD;  [facility];  Service: Vascular    OH TEAEC W/PATCH GRF CAROTID VERTB SUBCLAV NECK INC Left 4/3/2024    Procedure: ENDARTERECTOMY ARTERY CAROTID WITH BOVINE PATCH ANGIOPLASTY;  Surgeon: Mango Crockett MD;  Location[facility];  Service: Vascular    THROMBOENDARTERECTOMY      TONSILLECTOMY         Family History   Problem Relation Age of Onset     No Known Problems Mother     No Known Problems Father        Social History     Socioeconomic History    Marital status:      Spouse name: Not on file    Number of children: Not on file    Years of education: Not on file    Highest education level: Not on file   Occupational History    Not on file   Tobacco Use    Smoking status: Every Day     Current packs/day: 0.50     Average packs/day: 0.5 packs/day for 64.0 years (32.0 ttl pk-yrs)     Types: Cigarettes    Smokeless tobacco: Never    Tobacco comments:     Trying to quit  last smoked 4.3.24 0700   Vaping Use    Vaping status: Former   Substance and Sexual Activity    Alcohol use: Not Currently     Comment: occassionly    Drug use: No    Sexual activity: Not Currently   Other Topics Concern    Not on file   Social History Narrative    Not on file     Social Determinants of Health     Financial Resource Strain: High Risk (12/6/2022)    Overall Financial Resource Strain (CARDIA)     Difficulty of Paying Living Expenses: Hard   Food Insecurity: Not on file   Transportation Needs: No Transportation Needs (12/6/2022)    PRAPARE - Transportation     Lack of Transportation (Medical): No     Lack of Transportation (Non-Medical): No   Physical Activity: Not on file   Stress: Not on file   Social Connections: Not on file   Intimate Partner Violence: Not on file   Housing Stability: Not on file       Allergies   Allergen Reactions    Celecoxib GI Intolerance    Other Rash     Adhesive tape-skin tear    Cilostazol Edema     rash    Sutures  [Suture] Other (See Comments)     Doesn't tolerate dissolving sutures    Wound Dressing Adhesive      Other reaction(s): Itching         Current Outpatient Medications:     acetaminophen (TYLENOL) 325 mg tablet, Take 2 tablets (650 mg total) by mouth every 4 (four) hours as needed for mild pain, Disp: , Rfl:     Ascorbic Acid (vitamin C) 1000 MG tablet, Take 1,000 mg by mouth daily, Disp: , Rfl:     calcium carbonate (Calcium  Antacid Ultra Max St) 1000 MG chewable tablet, Chew 1,000 mg 3 (three) times a day, Disp: , Rfl:     cholecalciferol (VITAMIN D3) 25 mcg (1,000 units) tablet, Take 1,000 Units by mouth daily, Disp: , Rfl:     clopidogrel (PLAVIX) 75 mg tablet, TAKE 1 TABLET BY MOUTH EVERY DAY, Disp: 90 tablet, Rfl: 1    simvastatin (ZOCOR) 40 mg tablet, TAKE 1 TABLET BY MOUTH EVERYDAY AT BEDTIME, Disp: 90 tablet, Rfl: 1  I have spent a total time of 30 minutes on 04/11/24 in caring for this patient including Diagnostic results, Risks and benefits of tx options, Instructions for management, Patient and family education, Importance of tx compliance, Documenting in the medical record, Reviewing / ordering tests, medicine, procedures  , and Obtaining or reviewing history  .

## 2024-04-11 ENCOUNTER — TELEPHONE (OUTPATIENT)
Age: 84
End: 2024-04-11

## 2024-04-11 ENCOUNTER — OFFICE VISIT (OUTPATIENT)
Dept: VASCULAR SURGERY | Facility: CLINIC | Age: 84
End: 2024-04-11

## 2024-04-11 VITALS
HEART RATE: 72 BPM | SYSTOLIC BLOOD PRESSURE: 140 MMHG | DIASTOLIC BLOOD PRESSURE: 80 MMHG | BODY MASS INDEX: 27.56 KG/M2 | HEIGHT: 61 IN | WEIGHT: 146 LBS

## 2024-04-11 DIAGNOSIS — Z98.890 S/P CAROTID ENDARTERECTOMY: ICD-10-CM

## 2024-04-11 DIAGNOSIS — I65.22 CAROTID ARTERY STENOSIS WITHOUT CEREBRAL INFARCTION, LEFT: Primary | ICD-10-CM

## 2024-04-11 NOTE — TELEPHONE ENCOUNTER
Caller: patient    Doctor: ÁNGEL    Reason for call: Patient called in regarding her lyft ride , she did not get a confirmation . her appt is today with Dr NEAL this afternoon , please reach out to the patient .I did try to warm transfer but could not get a hold of anyone .     Call back#: [phone]

## 2024-04-11 NOTE — ASSESSMENT & PLAN NOTE
84 year-old female current smoker (1/2 PPD), with PMH COPD, HLD, HTN, bilateral carotid artery stenosis s/p R CEA 9/28/22 (Smalls), CKD 3, PAD, and aortoiliac occlusive disease. Patient is presenting to the vascular surgery office for post op appointment. Pt is now s/p L CEA by .    -Denies symptoms of numbness/ tingling/ weakness on one side of the body, facial droop, slurred speech or blindness in one eye.   -Presents to the office w/o complaints of pain at the L carotid incision. Has not required use of opioid/ prescribed pain medication.   -L carotid incision site is clean, dry and well approximated with surgical glue in place.   -Pt has concerns about allergy to dissolving sutures and surgical glue.  -No drainage, no erythremia, denies fever/ chills.  -Reports rash to the upper left chest area that is itching, has been applying cortisone cream to the area, advised against this and advised benadryl. Pt reports she is reluctant to take more medications.   -Increasing physical activity daily.   -Discussed post-op incision care and post-op restrictions. Educated on post-op follow up ultrasound surveillance and monitoring. All questions answered.   -She reports taking plavix 'sometimes' and pt was educated on the importance of daily compliance with this medication. She verbalized understanding and will continue to take the medication daily.           Plan  -Complete carotid ultrasound in 3 months and return to the office for review with your vascular surgeon.  -Continue to cleanse L neck incision site daily as per discharge instructions.   -Continue Plavix daily.   -Continue Rosuvastatin daily as prescribed by PCP.  -Start taking benadryl at night as per bottle instruction for rash on chest.  -Call 911/ Go to the ER with any S/S of TIA/ CVA.  -Call the office with any surgical site swelling, discoloration, fever/ chills.    -CC

## 2024-04-15 ENCOUNTER — TELEPHONE (OUTPATIENT)
Age: 84
End: 2024-04-15

## 2024-04-15 NOTE — TELEPHONE ENCOUNTER
Patient is requesting a Lyft ride for a Appt on 05/16/23 at 1pm for a VAS . Please reach out to the patient at [phone] for confirmation.

## 2024-05-10 ENCOUNTER — TELEPHONE (OUTPATIENT)
Dept: VASCULAR SURGERY | Facility: CLINIC | Age: 84
End: 2024-05-10

## 2024-05-10 NOTE — TELEPHONE ENCOUNTER
Malcolm RIVERA [facility]er[facility]; P Patient Isatu DIEGO  @ 08:50          Previous Messages       ----- Message -----  From: Shreya Vicente  Sent: 2024  12:00 AM EDT  To: Patient [employer]  Subject: Phil Valerio 24                                Patients Name: Jackelin Song  : 1940  Phone Number: [phone]  Appointment Date: 24  Appointment time: 1:00 pm   Address: [address] [place] [address]  Drop off Facility/Office Name: [facility]  Drop off  Address: [address] [place] [address] [address]  Cost Center: [phone]  Special notes/directions for   Note for scheduling team

## 2024-05-14 NOTE — TELEPHONE ENCOUNTER
Pt called stating they had to r/s their testing to 6/10/24 and will need the Lyft transport adjusted for the new date

## 2024-06-20 ENCOUNTER — TELEPHONE (OUTPATIENT)
Dept: VASCULAR SURGERY | Facility: CLINIC | Age: 84
End: 2024-06-20

## 2024-06-20 NOTE — TELEPHONE ENCOUNTER
Malcolm Vicente; P [facility]; P Patient Isatu  PT Cx'ed the  appt herself - the 8th is set          Previous Messages       ----- Message -----  From: Shreya Vicente  Sent: 2024   1:00 PM EDT  To: Malcolm Moulton; [facility]; *  Subject: RE: Lyft Ride 24                            Appt was changed to 24 at 1 pm , same location.  Please adjust  ----- Message -----  From: Malcolm Moulton  Sent: 2024  12:36 PM EDT  To: [facility]; *  Subject: RE: Lyft Ride 24                            ROUNDTRIP  @ 11:15  ----- Message -----  From: Shreya Vicente  Sent: 2024  12:00 AM EDT  To: Patient Isatu  Subject: Lyft Ride 24                                Patients Name: Jackelin WILSON Nohemi  : 1940  Phone Number: [phone]  Appointment Date: 24  Appointment time: 12:00 PM   Address: [address] [place] [address]  Drop off Facility/Office Name: [facility] [place]  Drop off  Address: [address] [place] [address] [place]  Cost Center: [phone]  Special notes/directions for   Note for scheduling team

## 2024-06-20 NOTE — TELEPHONE ENCOUNTER
----- Message from Malcolm YANG sent at 2024  1:22 PM EDT -----  Regarding: RE: Lyft Ride 24  PT Cx'ed the  appt herself - the 8th is set  ----- Message -----  From: Shreya Vicente  Sent: 2024   1:00 PM EDT  To: Malcolm Moulton; [facility]; #  Subject: RE: Lyft Ride 24                             Appt was changed to 24 at 1 pm , same location.  Please adjust  ----- Message -----  From: Malcolm Moulton  Sent: 2024  12:36 PM EDT  To: [facility]; #  Subject: RE: Lyft Ride 24                             ROUNDTRIP  @ 11:15  ----- Message -----  From: Shreya Vicente  Sent: 2024  12:00 AM EDT  To: Patient Isatu  Subject: Lyft Ride 24                                 Patients Name: Jackelin Song  : 1940  Phone Number: [phone]  Appointment Date: 24  Appointment time: 12:00 PM   Address: [address] [place] [address] [place] [address]  Drop off Facility/Office Name: [facility] [place]   Drop off  Address: [address] [place] [address] [place]   Cost Center: [phone]  Special notes/directions for   Note for scheduling team

## 2024-07-08 ENCOUNTER — HOSPITAL ENCOUNTER (OUTPATIENT)
Dept: NON INVASIVE DIAGNOSTICS | Facility: CLINIC | Age: 84
Discharge: HOME | End: 2024-07-08
Payer: COMMERCIAL

## 2024-07-08 DIAGNOSIS — I77.9 PERIPHERAL ARTERIAL OCCLUSIVE DISEASE (HCC): ICD-10-CM

## 2024-07-08 DIAGNOSIS — I65.22 CAROTID ARTERY STENOSIS WITHOUT CEREBRAL INFARCTION, LEFT: ICD-10-CM

## 2024-07-08 PROCEDURE — 93923 UPR/LXTR ART STDY 3+ LVLS: CPT

## 2024-07-08 PROCEDURE — 93922 UPR/L XTREMITY ART 2 LEVELS: CPT | Performed by: SURGERY

## 2024-07-08 PROCEDURE — 93880 EXTRACRANIAL BILAT STUDY: CPT | Performed by: SURGERY

## 2024-07-08 PROCEDURE — 93925 LOWER EXTREMITY STUDY: CPT | Performed by: SURGERY

## 2024-07-08 PROCEDURE — 93925 LOWER EXTREMITY STUDY: CPT

## 2024-07-08 PROCEDURE — 93880 EXTRACRANIAL BILAT STUDY: CPT

## 2024-07-15 ENCOUNTER — TELEPHONE (OUTPATIENT)
Age: 84
End: 2024-07-15

## 2024-07-15 DIAGNOSIS — E78.5 HYPERLIPIDEMIA, UNSPECIFIED HYPERLIPIDEMIA TYPE: ICD-10-CM

## 2024-07-15 DIAGNOSIS — I70.211 ATHEROSCLEROSIS OF NATIVE ARTERY OF RIGHT LOWER EXTREMITY WITH INTERMITTENT CLAUDICATION (HCC): ICD-10-CM

## 2024-07-15 NOTE — TELEPHONE ENCOUNTER
Patient called requesting refill for clopidogrel. Patient made aware medication was refilled on 03.26.2024, for 90 refills to 1 pharmacy. Patient instructed to contact the pharmacy to obtain refills of medication. Patient verbalized understanding.

## 2024-07-16 RX ORDER — SIMVASTATIN 40 MG
40 TABLET ORAL
Qty: 100 TABLET | Refills: 1 | Status: SHIPPED | OUTPATIENT
Start: 2024-07-16

## 2024-07-16 NOTE — TELEPHONE ENCOUNTER
Patient called today about her Plavix refill and states the pharmacy is unable to fill it for her and needs a call from clinical staff - attempted to call pharmacy and was disconnect from pt and pharmacy - please check why med is not able to be filled and call pt     Last prescribed by PCP

## 2024-08-19 ENCOUNTER — TELEPHONE (OUTPATIENT)
Dept: VASCULAR SURGERY | Facility: HOSPITAL | Age: 84
End: 2024-08-19

## 2024-08-19 NOTE — TELEPHONE ENCOUNTER
Malcolm Lilly; P Patient Isatu Phipps, Aug 22 @  12:45pm          Previous Messages       ----- Message -----  From: Charissa Lilly  Sent: 2024   2:25 PM EDT  To: Patient Isatu  Subject: LYFT MARSHA                                        Patients Name: Jackelin Song  : 1940  Phone Number: [phone]  Appointment Date: 24  Appointment time: 1:30 PM   Address: [address] [place] [address]  Drop off Facility/Office Name: [facility]  Drop off  Address: [address], [place]  [address]  Cost Center: [phone]  Special notes/directions for  [facility]  Note for scheduling team

## 2024-08-20 NOTE — TELEPHONE ENCOUNTER
Caller: Jackelin Song    Doctor: Dr. Crockett    Reason for call: Pt called to confirm appt date, time and provider. Pt also confirming that a lyft was placed. Informed pt of 12:45 p/u time for her appt on Thursday at 1:30 w/ Dr. Crockett. Informed the pt that if she does not hear from the Lyft by 12:30 on Thursday to give us a call and we would confirm they're on the way.    Call back#: [phone]

## 2024-08-22 NOTE — TELEPHONE ENCOUNTER
Caller:  Jackelin    Doctor: Dr. Crockett     Reason for call:  Patient called to cancel appointment and Lyft ride. Patient lost a family member and asked if she can just cancel today and call back when she's ready to re-schedule again     Call back#: [phone]

## 2024-08-29 ENCOUNTER — TELEPHONE (OUTPATIENT)
Age: 84
End: 2024-08-29

## 2024-08-29 DIAGNOSIS — F51.05 INSOMNIA SECONDARY TO ANXIETY: Primary | ICD-10-CM

## 2024-08-29 DIAGNOSIS — F41.9 INSOMNIA SECONDARY TO ANXIETY: Primary | ICD-10-CM

## 2024-08-29 RX ORDER — LORAZEPAM 0.5 MG/1
0.5 TABLET ORAL EVERY 8 HOURS PRN
Qty: 30 TABLET | Refills: 0 | Status: SHIPPED | OUTPATIENT
Start: 2024-08-29

## 2024-08-29 NOTE — TELEPHONE ENCOUNTER
Daughter passe away 8/21/24, having trouble sleeping.  Requesting medication be [facility]

## 2024-09-05 ENCOUNTER — TELEPHONE (OUTPATIENT)
Age: 84
End: 2024-09-05

## 2024-09-05 NOTE — TELEPHONE ENCOUNTER
Caller: Jackelin Song    Doctor: Dr. Crockett    Reason for call: Pt called to r/s her 8/22/24 appt. Pt is looking to r/s for an appt later this month and after, preferably in the afternoon. No appts available to schedule. Pt will also need a lyft ride set up.Please contact pt for rescheduling of this appt and to inform of lyft  time.    Call back#: [phone]

## 2024-09-11 ENCOUNTER — TELEPHONE (OUTPATIENT)
Age: 84
End: 2024-09-11

## 2024-09-11 NOTE — TELEPHONE ENCOUNTER
Caller: Jackelin Song    Doctor: Dr. Crockett    Reason for call: Patient is calling to reschedule a canceled appointment from 8/22/24. No available appointments; calendar not open yet for the new year. Jackelin shared that once the calendar is open, she would like a call back to schedule. The appointment would have to be after 10 am and a Lyft would be needed.    Call back#: [phone]

## 2024-09-12 DIAGNOSIS — I70.211 ATHEROSCLEROSIS OF NATIVE ARTERY OF RIGHT LOWER EXTREMITY WITH INTERMITTENT CLAUDICATION (HCC): ICD-10-CM

## 2024-09-12 RX ORDER — CLOPIDOGREL BISULFATE 75 MG/1
75 TABLET ORAL DAILY
Qty: 90 TABLET | Refills: 1 | Status: SHIPPED | OUTPATIENT
Start: 2024-09-12

## 2024-10-09 ENCOUNTER — TELEPHONE (OUTPATIENT)
Age: 84
End: 2024-10-09

## 2024-10-09 NOTE — TELEPHONE ENCOUNTER
Patients Name: Jackelin Song  : 1940  Phone Number: [phone]  Appointment Date: 10/29/24  Appointment time: 1 pm   Address: [address] [place] [id number]  Drop off Facility/Office Name: [facility]

## 2024-10-25 ENCOUNTER — TELEPHONE (OUTPATIENT)
Age: 84
End: 2024-10-25

## 2024-10-25 NOTE — TELEPHONE ENCOUNTER
Caller: Jackelin Song    Doctor: Keira    Reason for call: Will need Lyft ride for 11/7/24 appointment with Keira in [place]    Call back#: [phone]

## 2024-10-29 ENCOUNTER — OFFICE VISIT (OUTPATIENT)
Dept: FAMILY MEDICINE CLINIC | Facility: CLINIC | Age: 84
End: 2024-10-29
Payer: COMMERCIAL

## 2024-10-29 VITALS
HEIGHT: 61 IN | HEART RATE: 76 BPM | OXYGEN SATURATION: 98 % | DIASTOLIC BLOOD PRESSURE: 76 MMHG | SYSTOLIC BLOOD PRESSURE: 134 MMHG | RESPIRATION RATE: 16 BRPM | TEMPERATURE: 97.4 F | WEIGHT: 137 LBS | BODY MASS INDEX: 25.86 KG/M2

## 2024-10-29 DIAGNOSIS — Z00.00 MEDICARE ANNUAL WELLNESS VISIT, SUBSEQUENT: Primary | ICD-10-CM

## 2024-10-29 DIAGNOSIS — E55.9 VITAMIN D DEFICIENCY: ICD-10-CM

## 2024-10-29 DIAGNOSIS — M15.0 PRIMARY OSTEOARTHRITIS INVOLVING MULTIPLE JOINTS: ICD-10-CM

## 2024-10-29 DIAGNOSIS — I65.22 CAROTID ARTERY STENOSIS WITHOUT CEREBRAL INFARCTION, LEFT: ICD-10-CM

## 2024-10-29 DIAGNOSIS — F43.29 GRIEF REACTION WITH PROLONGED BEREAVEMENT: ICD-10-CM

## 2024-10-29 DIAGNOSIS — Z23 ENCOUNTER FOR IMMUNIZATION: ICD-10-CM

## 2024-10-29 DIAGNOSIS — I74.09 OTHER ARTERIAL EMBOLISM AND THROMBOSIS OF ABDOMINAL AORTA (HCC): ICD-10-CM

## 2024-10-29 DIAGNOSIS — I77.9 PERIPHERAL ARTERIAL OCCLUSIVE DISEASE (HCC): ICD-10-CM

## 2024-10-29 DIAGNOSIS — F51.05 INSOMNIA SECONDARY TO ANXIETY: ICD-10-CM

## 2024-10-29 DIAGNOSIS — E78.00 PURE HYPERCHOLESTEROLEMIA: ICD-10-CM

## 2024-10-29 DIAGNOSIS — J44.9 CHRONIC OBSTRUCTIVE PULMONARY DISEASE, UNSPECIFIED COPD TYPE (HCC): ICD-10-CM

## 2024-10-29 DIAGNOSIS — F41.9 INSOMNIA SECONDARY TO ANXIETY: ICD-10-CM

## 2024-10-29 DIAGNOSIS — M85.80 OSTEOPENIA, UNSPECIFIED LOCATION: ICD-10-CM

## 2024-10-29 DIAGNOSIS — I70.211 ATHEROSCLEROSIS OF NATIVE ARTERY OF RIGHT LOWER EXTREMITY WITH INTERMITTENT CLAUDICATION (HCC): ICD-10-CM

## 2024-10-29 DIAGNOSIS — F17.200 SMOKER: ICD-10-CM

## 2024-10-29 PROCEDURE — 90662 IIV NO PRSV INCREASED AG IM: CPT

## 2024-10-29 PROCEDURE — 99214 OFFICE O/P EST MOD 30 MIN: CPT | Performed by: FAMILY MEDICINE

## 2024-10-29 PROCEDURE — G0008 ADMIN INFLUENZA VIRUS VAC: HCPCS

## 2024-10-29 PROCEDURE — G0439 PPPS, SUBSEQ VISIT: HCPCS | Performed by: FAMILY MEDICINE

## 2024-10-29 RX ORDER — LORAZEPAM 0.5 MG/1
0.5 TABLET ORAL EVERY 8 HOURS PRN
Qty: 30 TABLET | Refills: 0 | Status: SHIPPED | OUTPATIENT
Start: 2024-10-29

## 2024-10-29 NOTE — PROGRESS NOTES
Assessment/Plan:   Patient received high-dose flu vaccine today.  Patient to continue present treatment.  Instructed to follow a low-fat and a low-salt diet and get regular exercise walking as tolerated.  Patient prefers to hold off on labs until next appointment.  Return to the office in 6 months.   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)    Grief reaction with prolonged bereavement    Peripheral arterial occlusive disease (HCC)    Carotid artery stenosis without cerebral infarction, left    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 (HCC)    Chronic obstructive pulmonary disease, unspecified COPD type (HCC)    Primary osteoarthritis involving multiple joints    Osteopenia, unspecified location    Vitamin D deficiency    Smoker    Encounter for immunization  -     influenza vaccine, high-dose, PF 0.5 mL (Fluzone High Dose)          Subjective:     Patient ID: Jackelin Song is a 84 y.o. female.    Patient is here for annual Medicare wellness exam and follow-up of chronic conditions.  Patient admits to grieving over the death of her daughter 9 weeks ago.  Patient continues to smoke and is not prepared to quit.  Patient requests flu vaccine today.    Hyperlipidemia  This is a chronic problem. The problem is controlled. She has no history of diabetes or hypothyroidism. Associated symptoms include leg pain and shortness of breath. Pertinent negatives include no chest pain, focal sensory loss, focal weakness or myalgias. Current antihyperlipidemic treatment includes statins. The current treatment provides significant improvement of lipids. There are no compliance problems.  Risk  factors for coronary artery disease include dyslipidemia, family history and post-menopausal.       Review of Systems   Respiratory:  Positive for shortness of breath.    Cardiovascular:  Negative for chest pain.   Musculoskeletal:  Negative for myalgias.   Neurological:  Negative for focal weakness.         Objective:     Physical Exam  Constitutional:       General: She is not in acute distress.  HENT:      Head: Normocephalic.      Mouth/Throat:      Mouth: Mucous membranes are moist.   Eyes:      General: No scleral icterus.     Conjunctiva/sclera: Conjunctivae normal.   Neck:      Vascular: No carotid bruit.   Cardiovascular:      Rate and Rhythm: Normal rate and regular rhythm.   Pulmonary:      Effort: Pulmonary effort is normal. No respiratory distress.      Breath sounds: No wheezing.      Comments: Distant breath sounds throughout.  Abdominal:      Palpations: Abdomen is soft.      Tenderness: There is no abdominal tenderness.   Musculoskeletal:      Cervical back: Neck supple.      Right lower leg: No edema.      Left lower leg: No edema.   Lymphadenopathy:      Cervical: No cervical adenopathy.   Skin:     General: Skin is warm and dry.   Neurological:      General: No focal deficit present.      Mental Status: She is alert and oriented to person, place, and time.   Psychiatric:         Behavior: Behavior normal.         Thought Content: Thought content normal.         Judgment: Judgment normal.      Comments: Patient appears anxious and tearful.

## 2024-10-29 NOTE — PATIENT INSTRUCTIONS
Medicare Preventive Visit Patient Instructions  Thank you for completing your Welcome to Medicare Visit or Medicare Annual Wellness Visit today. Your next wellness visit will be due in one year (10/30/2025).  The screening/preventive services that you may require over the next 5-10 years are detailed below. Some tests may not apply to you based off risk factors and/or age. Screening tests ordered at today's visit but not completed yet may show as past due. Also, please note that scanned in results may not display below.  Preventive Screenings:  Service Recommendations Previous Testing/Comments   Colorectal Cancer Screening  * Colonoscopy    * Fecal Occult Blood Test (FOBT)/Fecal Immunochemical Test (FIT)  * Fecal DNA/Cologuard Test  * Flexible Sigmoidoscopy Age: 45-75 years old   Colonoscopy: every 10 years (may be performed more frequently if at higher risk)  OR  FOBT/FIT: every 1 year  OR  Cologuard: every 3 years  OR  Sigmoidoscopy: every 5 years  Screening may be recommended earlier than age 45 if at higher risk for colorectal cancer. Also, an individualized decision between you and your healthcare provider will decide whether screening between the ages of 76-85 would be appropriate. Colonoscopy: Not on file  FOBT/FIT: Not on file  Cologuard: Not on file  Sigmoidoscopy: Not on file          Breast Cancer Screening Age: 40+ years old  Frequency: every 1-2 years  Not required if history of left and right mastectomy Mammogram: 07/08/2014        Cervical Cancer Screening Between the ages of 21-29, pap smear recommended once every 3 years.   Between the ages of 30-65, can perform pap smear with HPV co-testing every 5 years.   Recommendations may differ for women with a history of total hysterectomy, cervical cancer, or abnormal pap smears in past. Pap Smear: Not on file    Screening Not Indicated   Hepatitis C Screening Once for adults born between 1945 and 1965  More frequently in patients at high risk for Hepatitis  C Hep C Antibody: Not on file        Diabetes Screening 1-2 times per year if you're at risk for diabetes or have pre-diabetes Fasting glucose: 99 mg/dL (1/6/2024)  A1C: No results in last 5 years (No results in last 5 years)  Screening Current   Cholesterol Screening Once every 5 years if you don't have a lipid disorder. May order more often based on risk factors. Lipid panel: 01/06/2024    Screening Not Indicated  History Lipid Disorder     Other Preventive Screenings Covered by Medicare:  Abdominal Aortic Aneurysm (AAA) Screening: covered once if your at risk. You're considered to be at risk if you have a family history of AAA.  Lung Cancer Screening: covers low dose CT scan once per year if you meet all of the following conditions: (1) Age 55-77; (2) No signs or symptoms of lung cancer; (3) Current smoker or have quit smoking within the last 15 years; (4) You have a tobacco smoking history of at least 20 pack years (packs per day multiplied by number of years you smoked); (5) You get a written order from a healthcare provider.  Glaucoma Screening: covered annually if you're considered high risk: (1) You have diabetes OR (2) Family history of glaucoma OR (3)  aged 50 and older OR (4)  American aged 65 and older  Osteoporosis Screening: covered every 2 years if you meet one of the following conditions: (1) You're estrogen deficient and at risk for osteoporosis based off medical history and other findings; (2) Have a vertebral abnormality; (3) On glucocorticoid therapy for more than 3 months; (4) Have primary hyperparathyroidism; (5) On osteoporosis medications and need to assess response to drug therapy.   Last bone density test (DXA Scan): 04/23/2013.  HIV Screening: covered annually if you're between the age of 15-65. Also covered annually if you are younger than 15 and older than 65 with risk factors for HIV infection. For pregnant patients, it is covered up to 3 times per  pregnancy.    Immunizations:  Immunization Recommendations   Influenza Vaccine Annual influenza vaccination during flu season is recommended for all persons aged >= 6 months who do not have contraindications   Pneumococcal Vaccine   * Pneumococcal conjugate vaccine = PCV13 (Prevnar 13), PCV15 (Vaxneuvance), PCV20 (Prevnar 20)  * Pneumococcal polysaccharide vaccine = PPSV23 (Pneumovax) Adults 19-65 yo with certain risk factors or if 65+ yo  If never received any pneumonia vaccine: recommend Prevnar 20 (PCV20)  Give PCV20 if previously received 1 dose of PCV13 or PPSV23   Hepatitis B Vaccine 3 dose series if at intermediate or high risk (ex: diabetes, end stage renal disease, liver disease)   Respiratory syncytial virus (RSV) Vaccine - COVERED BY MEDICARE PART D  * RSVPreF3 (Arexvy) CDC recommends that adults 60 years of age and older may receive a single dose of RSV vaccine using shared clinical decision-making (SCDM)   Tetanus (Td) Vaccine - COST NOT COVERED BY MEDICARE PART B Following completion of primary series, a booster dose should be given every 10 years to maintain immunity against tetanus. Td may also be given as tetanus wound prophylaxis.   Tdap Vaccine - COST NOT COVERED BY MEDICARE PART B Recommended at least once for all adults. For pregnant patients, recommended with each pregnancy.   Shingles Vaccine (Shingrix) - COST NOT COVERED BY MEDICARE PART B  2 shot series recommended in those 19 years and older who have or will have weakened immune systems or those 50 years and older     Health Maintenance Due:      Topic Date Due   • Lung Cancer Screening  Discontinued     Immunizations Due:      Topic Date Due   • Hepatitis A Vaccine (1 of 2 - Risk 2-dose series) Never done   • Influenza Vaccine (1) 09/01/2024   • COVID-19 Vaccine (4 - 2023-24 season) 09/01/2024     Advance Directives   What are advance directives?  Advance directives are legal documents that state your wishes and plans for medical care.  These plans are made ahead of time in case you lose your ability to make decisions for yourself. Advance directives can apply to any medical decision, such as the treatments you want, and if you want to donate organs.   What are the types of advance directives?  There are many types of advance directives, and each state has rules about how to use them. You may choose a combination of any of the following:  Living will:  This is a written record of the treatment you want. You can also choose which treatments you do not want, which to limit, and which to stop at a certain time. This includes surgery, medicine, IV fluid, and tube feedings.   Durable power of  for healthcare (DPAHC):  This is a written record that states who you want to make healthcare choices for you when you are unable to make them for yourself. This person, called a proxy, is usually a family member or a friend. You may choose more than 1 proxy.  Do not resuscitate (DNR) order:  A DNR order is used in case your heart stops beating or you stop breathing. It is a request not to have certain forms of treatment, such as CPR. A DNR order may be included in other types of advance directives.  Medical directive:  This covers the care that you want if you are in a coma, near death, or unable to make decisions for yourself. You can list the treatments you want for each condition. Treatment may include pain medicine, surgery, blood transfusions, dialysis, IV or tube feedings, and a ventilator (breathing machine).  Values history:  This document has questions about your views, beliefs, and how you feel and think about life. This information can help others choose the care that you would choose.  Why are advance directives important?  An advance directive helps you control your care. Although spoken wishes may be used, it is better to have your wishes written down. Spoken wishes can be misunderstood, or not followed. Treatments may be given even if you  do not want them. An advance directive may make it easier for your family to make difficult choices about your care.   Fall Prevention    Fall prevention  includes ways to make your home and other areas safer. It also includes ways you can move more carefully to prevent a fall. Health conditions that cause changes in your blood pressure, vision, or muscle strength and coordination may increase your risk for falls. Medicines may also increase your risk for falls if they make you dizzy, weak, or sleepy.   Fall prevention tips:   Stand or sit up slowly.    Use assistive devices as directed.    Wear shoes that fit well and have soles that .    Wear a personal alarm.    Stay active.    Manage your medical conditions.    Home Safety Tips:  Add items to prevent falls in the bathroom.    Keep paths clear.    Install bright lights in your home.    Keep items you use often on shelves within reach.    Paint or place reflective tape on the edges of your stairs.    Cigarette Smoking and Your Health   Risks to your health if you smoke:  Nicotine and other chemicals found in tobacco damage every cell in your body. Even if you are a light smoker, you have an increased risk for cancer, heart disease, and lung disease. If you are pregnant or have diabetes, smoking increases your risk for complications.   Benefits to your health if you stop smoking:   You decrease respiratory symptoms such as coughing, wheezing, and shortness of breath.   You reduce your risk for cancers of the lung, mouth, throat, kidney, bladder, pancreas, stomach, and cervix. If you already have cancer, you increase the benefits of chemotherapy. You also reduce your risk for cancer returning or a second cancer from developing.   You reduce your risk for heart disease, blood clots, heart attack, and stroke.   You reduce your risk for lung infections, and diseases such as pneumonia, asthma, chronic bronchitis, and emphysema.  Your circulation improves. More  oxygen can be delivered to your body. If you have diabetes, you lower your risk for complications, such as kidney, artery, and eye diseases. You also lower your risk for nerve damage. Nerve damage can lead to amputations, poor vision, and blindness.  You improve your body's ability to heal and to fight infections.  For more information and support to stop smoking:   [email or web address]  Phone: 6- 246 - 241-5794  Web Address: www.[employer]  Weight Management   Why it is important to manage your weight:  Being overweight increases your risk of health conditions such as heart disease, high blood pressure, type 2 diabetes, and certain types of cancer. It can also increase your risk for osteoarthritis, sleep apnea, and other respiratory problems. Aim for a slow, steady weight loss. Even a small amount of weight loss can lower your risk of health problems.  How to lose weight safely:  A safe and healthy way to lose weight is to eat fewer calories and get regular exercise. You can lose up about 1 pound a week by decreasing the number of calories you eat by 500 calories each day.   Healthy meal plan for weight management:  A healthy meal plan includes a variety of foods, contains fewer calories, and helps you stay healthy. A healthy meal plan includes the following:  Eat whole-grain foods more often.  A healthy meal plan should contain fiber. Fiber is the part of grains, fruits, and vegetables that is not broken down by your body. Whole-grain foods are healthy and provide extra fiber in your diet. Some examples of whole-grain foods are whole-wheat breads and pastas, oatmeal, brown rice, and bulgur.  Eat a variety of vegetables every day.  Include dark, leafy greens such as spinach, kale, malcolm greens, and mustard greens. Eat yellow and orange vegetables such as carrots, sweet potatoes, and winter squash.   Eat a variety of fruits every day.  Choose fresh or canned fruit (canned in its own juice or light syrup) instead of  juice. Fruit juice has very little or no fiber.  Eat low-fat dairy foods.  Drink fat-free (skim) milk or 1% milk. Eat fat-free yogurt and low-fat cottage cheese. Try low-fat cheeses such as mozzarella and other reduced-fat cheeses.  Choose meat and other protein foods that are low in fat.  Choose beans or other legumes such as split peas or lentils. Choose fish, skinless poultry (chicken or turkey), or lean cuts of red meat (beef or pork). Before you cook meat or poultry, cut off any visible fat.   Use less fat and oil.  Try baking foods instead of frying them. Add less fat, such as margarine, sour cream, regular salad dressing and mayonnaise to foods. Eat fewer high-fat foods. Some examples of high-fat foods include french fries, doughnuts, ice cream, and cakes.  Eat fewer sweets.  Limit foods and drinks that are high in sugar. This includes candy, cookies, regular soda, and sweetened drinks.  Exercise:  Exercise at least 30 minutes per day on most days of the week. Some examples of exercise include walking, biking, dancing, and swimming. You can also fit in more physical activity by taking the stairs instead of the elevator or parking farther away from stores. Ask your healthcare provider about the best exercise plan for you.      © Copyright [employer] 2018 Information is for End User's use only and may not be sold, redistributed or otherwise used for commercial purposes. All illustrations and images included in CareNotes® are the copyrighted property of [employer] or [employer]

## 2024-10-29 NOTE — PROGRESS NOTES
Ambulatory Visit  Name: Jackelin Song      : 1940      MRN: [id number]  Encounter Provider: Ubaldo Trevizo DO  Encounter Date: 10/29/2024   Encounter department: [facility]    Assessment & Plan      Depression Screening and Follow-up Plan: Patient was screened for depression during today's encounter. They screened negative with a PHQ-2 score of 2.    Tobacco Cessation Counseling: Tobacco cessation counseling was provided. The patient is sincerely urged to quit consumption of tobacco. She is not ready to quit tobacco.       Preventive health issues were discussed with patient, and age appropriate screening tests were ordered as noted in patient's After Visit Summary. Personalized health advice and appropriate referrals for health education or preventive services given if needed, as noted in patient's After Visit Summary.    History of Present Illness     HPI   Patient Care Team:  Ubaldo Trevizo DO as PCP - General (Family Medicine)  Ubaldo Trevizo DO as PCP - PCP-[facility]  Ubaldo Trevizo,  as PCP - PCP-[facility] ([facility])  DO Hussein Green MD Michael Gabriel, DO Daniel Scott Heckman, MD Michael Gabriel, DO    Review of Systems  Medical History Reviewed by provider this encounter:       Annual Wellness Visit Questionnaire   Jackelin is here for her Subsequent Wellness visit. Last Medicare Wellness visit information reviewed, patient interviewed and updates made to the record today.      Health Risk Assessment:   Patient rates overall health as excellent. Patient feels that their physical health rating is same. Patient is very satisfied with their life. Eyesight was rated as same. Hearing was rated as same. Patient feels that their emotional and mental health rating is slightly worse. Patients states they are never, rarely angry. Patient states they are sometimes unusually tired/fatigued. Pain experienced in the last 7  days has been some. Patient's pain rating has been 4/10. Patient states that she has experienced no weight loss or gain in last 6 months.     Depression Screening:   PHQ-2 Score: 2      Fall Risk Screening:   In the past year, patient has experienced: history of falling in past year    Number of falls: 1  Injured during fall?: No    Feels unsteady when standing or walking?: No    Worried about falling?: No      Urinary Incontinence Screening:   Patient has not leaked urine accidently in the last six months.     Home Safety:  Patient does not have trouble with stairs inside or outside of their home. Patient has working smoke alarms and has working carbon monoxide detector. Home safety hazards include: none.     Nutrition:   Current diet is Regular.     Medications:   Patient is currently taking over-the-counter supplements. OTC medications include: see medication list. Patient is able to manage medications.     Activities of Daily Living (ADLs)/Instrumental Activities of Daily Living (IADLs):   Walk and transfer into and out of bed and chair?: Yes  Dress and groom yourself?: Yes    Bathe or shower yourself?: Yes    Feed yourself? Yes  Do your laundry/housekeeping?: Yes  Manage your money, pay your bills and track your expenses?: Yes  Make your own meals?: Yes    Do your own shopping?: Yes    Previous Hospitalizations:   Any hospitalizations or ED visits within the last 12 months?: Yes    How many hospitalizations have you had in the last year?: 1-2    Hospitalization Comments: S/P CEA    Advance Care Planning:   Living will: Yes    Durable POA for healthcare: Yes    Advanced directive: Yes      Cognitive Screening:   Provider or family/friend/caregiver concerned regarding cognition?: No    PREVENTIVE SCREENINGS      Cardiovascular Screening:    General: Screening Not Indicated, History Lipid Disorder and Risks and Benefits Discussed      Diabetes Screening:     General: Screening Current and Risks and Benefits  Discussed      Colorectal Cancer Screening:     General: Screening Not Indicated      Breast Cancer Screening:     General: Risks and Benefits Discussed and Patient Declines      Cervical Cancer Screening:    General: Screening Not Indicated and Risks and Benefits Discussed      Osteoporosis Screening:    General: Risks and Benefits Discussed and Screening Current      Abdominal Aortic Aneurysm (AAA) Screening:        General: Screening Not Indicated and Risks and Benefits Discussed      Lung Cancer Screening:     General: Risks and Benefits Discussed, Patient Declines and Screening Not Indicated      Hepatitis C Screening:    General: Risks and Benefits Discussed and Screening Not Indicated    Screening, Brief Intervention, and Referral to Treatment (SBIRT)    Screening  Typical number of drinks in a day: 0  Typical number of drinks in a week: 0  Interpretation: Low risk drinking behavior.    AUDIT-C Screenin) How often did you have a drink containing alcohol in the past year? monthly or less  2) How many drinks did you have on a typical day when you were drinking in the past year? 1 to 2  3) How often did you have 6 or more drinks on one occasion in the past year? never    AUDIT-C Score: 1  Interpretation: Score 0-2 (female): Negative screen for alcohol misuse    Single Item Drug Screening:  How often have you used an illegal drug (including marijuana) or a prescription medication for non-medical reasons in the past year? never    Single Item Drug Screen Score: 0  Interpretation: Negative screen for possible drug use disorder    Brief Intervention  Alcohol & drug use screenings were reviewed. No concerns regarding substance use disorder identified. Healthy alcohol use/limits discussed.     Other Counseling Topics:   Car/seat belt/driving safety, skin self-exam, sunscreen and calcium and vitamin D intake and regular weightbearing exercise.     Social Determinants of Health     Financial Resource Strain: High  "Risk (12/6/2022)    Overall Financial Resource Strain (CARDIA)     Difficulty of Paying Living Expenses: Hard   Transportation Needs: No Transportation Needs (12/6/2022)    PRAPARE - Transportation     Lack of Transportation (Medical): No     Lack of Transportation (Non-Medical): No     No results found.    Objective     /70   Pulse 85   Temp (!) 97.4 °F (36.3 °C)   Ht 5' 1\" (1.549 m)   Wt 62.1 kg (137 lb)   SpO2 98%   BMI 25.89 kg/m²     Physical Exam    "

## 2024-10-30 NOTE — TELEPHONE ENCOUNTER
Caller: Jackelin    Doctor: david    Reason for call: Patient is confirming that her [employer] was ordered. Is there a time for  verified?I told her [employer] will usually reach out a day or 2 prior to appointment with a time.     Call back#: [phone]

## 2024-11-07 ENCOUNTER — OFFICE VISIT (OUTPATIENT)
Dept: VASCULAR SURGERY | Facility: CLINIC | Age: 84
End: 2024-11-07
Payer: COMMERCIAL

## 2024-11-07 VITALS
DIASTOLIC BLOOD PRESSURE: 70 MMHG | HEART RATE: 77 BPM | WEIGHT: 137 LBS | HEIGHT: 61 IN | SYSTOLIC BLOOD PRESSURE: 168 MMHG | BODY MASS INDEX: 25.86 KG/M2

## 2024-11-07 DIAGNOSIS — Z98.890 S/P CAROTID ENDARTERECTOMY: Primary | ICD-10-CM

## 2024-11-07 DIAGNOSIS — I67.89 OTHER CEREBROVASCULAR DISEASE: ICD-10-CM

## 2024-11-07 PROCEDURE — 99214 OFFICE O/P EST MOD 30 MIN: CPT | Performed by: SURGERY

## 2024-11-07 NOTE — PATIENT INSTRUCTIONS
1. S/P carotid endarterectomy  Assessment & Plan:  Follow-up Doppler study suggestive of normal, patent flow at bilateral ICAs.  Residual left incisional numbness persistent but improving.  No neurologic deficits.  Repeat Doppler study in 6 months with follow-up visit.  Continue medical management.    Orders:    VAS carotid complete study; Future    Orders:  -     VAS carotid complete study; Future; Expected date: 05/07/2025  2. Other cerebrovascular disease  -     VAS carotid complete study; Future; Expected date: 05/07/2025

## 2024-11-07 NOTE — ASSESSMENT & PLAN NOTE
Follow-up Doppler study suggestive of normal, patent flow at bilateral ICAs.  Residual left incisional numbness persistent but improving.  No neurologic deficits.  Repeat Doppler study in 6 months with follow-up visit.  Continue medical management.    Orders:    VAS carotid complete study; Future

## 2024-11-07 NOTE — PROGRESS NOTES
Ambulatory Visit  Name: Jackelin Song      : 1940      MRN: [id number]  Encounter Provider: Mango Crockett MD  Encounter Date: 2024   Enc[facility] department: [facility]    Assessment & Plan  S/P carotid endarterectomy  Follow-up Doppler study suggestive of normal, patent flow at bilateral ICAs.  Residual left incisional numbness persistent but improving.  No neurologic deficits.  Repeat Doppler study in 6 months with follow-up visit.  Continue medical management.    Orders:    VAS carotid complete study; Future    Other cerebrovascular disease    Orders:    VAS carotid complete study; Future      History of Present Illness     Jackelin Song is a 84 y.o. female who presents in follow-up from her carotid endarterectomy.  Her follow-up visit has been delayed due to illness and her daughter who unfortunately passed away from a malignancy.  We spent several minutes discussing her daughter and sharing condolences.  She is seeing her primary care and receiving antidepressant therapy for assistance in management of this issue.  She states she has had no new neurologic deficits to include lateralizing numbness or weakness, speech disturbances or any visual deficits.  She does have jared-incisional numbness but is continuing and was initially quite bothersome in her left ear.    Patient presents to review CV/ MIKAEL. Patient reports R>L calf claudication after 1/2 block. Denies tissue loss/ rest pain. Patient denies s/s CVA.      Review of Systems   Constitutional: Negative.    HENT: Negative.     Eyes: Negative.    Respiratory: Negative.     Cardiovascular: Negative.    Gastrointestinal: Negative.    Endocrine: Negative.    Genitourinary: Negative.    Musculoskeletal: Negative.    Skin: Negative.    Allergic/Immunologic: Negative.    Neurological: Negative.    Hematological: Negative.    Psychiatric/Behavioral: Negative.             Objective     /70 (BP Location: Left  "arm, Patient Position: Sitting, Cuff Size: Standard)   Pulse 77   Ht 5' 1\" (1.549 m)   Wt 62.1 kg (137 lb)   BMI 25.89 kg/m²     Physical Exam  Vitals and nursing note reviewed.   Constitutional:       General: She is not in acute distress.     Appearance: She is well-developed.   HENT:      Head: Normocephalic and atraumatic.   Eyes:      Conjunctiva/sclera: Conjunctivae normal.   Cardiovascular:      Rate and Rhythm: Normal rate and regular rhythm.      Heart sounds: No murmur heard.  Pulmonary:      Effort: Pulmonary effort is normal. No respiratory distress.      Breath sounds: Normal breath sounds.   Abdominal:      Palpations: Abdomen is soft.      Tenderness: There is no abdominal tenderness.   Musculoskeletal:         General: No swelling.      Cervical back: Neck supple.   Skin:     General: Skin is warm and dry.      Capillary Refill: Capillary refill takes less than 2 seconds.   Neurological:      Mental Status: She is alert.   Psychiatric:         Mood and Affect: Mood normal.         "

## 2024-11-07 NOTE — LETTER
2024     Ubaldo Trevizo DO  [address]  [facility] [address]    Patient: Jackelin Song   YOB: 1940   Date of Visit: 2024       Dear Dr. Trevizo:    Thank you for referring Jackelin Song to me for evaluation. Below are my notes for this consultation.    If you have questions, please do not hesitate to call me. I look forward to following your patient along with you.         Sincerely,        Mango Crockett MD        CC: Jackelin Song    Mango Crockett MD  2024  6:21 PM  Sign when Signing Visit  Ambulatory Visit  Name: Jackelin Song      : 1940      MRN: [id number]  Encounter Provider: Mango Crockett MD  Encounter Date: 2024   Encounter department: [facility] [place]    Assessment & Plan  S/P carotid endarterectomy  Follow-up Doppler study suggestive of normal, patent flow at bilateral ICAs.  Residual left incisional numbness persistent but improving.  No neurologic deficits.  Repeat Doppler study in 6 months with follow-up visit.  Continue medical management.    Orders:  •  VAS carotid complete study; Future    Other cerebrovascular disease    Orders:  •  VAS carotid complete study; Future      History of Present Illness    Jackelin Song is a 84 y.o. female who presents in follow-up from her carotid endarterectomy.  Her follow-up visit has been delayed due to illness and her daughter who unfortunately passed away from a malignancy.  We spent several minutes discussing her daughter and sharing condolences.  She is seeing her primary care and receiving antidepressant therapy for assistance in management of this issue.  She states she has had no new neurologic deficits to include lateralizing numbness or weakness, speech disturbances or any visual deficits.  She does have jared-incisional numbness but is continuing and was initially quite bothersome in her left ear.    Patient presents to review CV/ MIKAEL.  "Patient reports R>L calf claudication after 1/2 block. Denies tissue loss/ rest pain. Patient denies s/s CVA.      Review of Systems   Constitutional: Negative.    HENT: Negative.     Eyes: Negative.    Respiratory: Negative.     Cardiovascular: Negative.    Gastrointestinal: Negative.    Endocrine: Negative.    Genitourinary: Negative.    Musculoskeletal: Negative.    Skin: Negative.    Allergic/Immunologic: Negative.    Neurological: Negative.    Hematological: Negative.    Psychiatric/Behavioral: Negative.             Objective    /70 (BP Location: Left arm, Patient Position: Sitting, Cuff Size: Standard)   Pulse 77   Ht 5' 1\" (1.549 m)   Wt 62.1 kg (137 lb)   BMI 25.89 kg/m²     Physical Exam  Vitals and nursing note reviewed.   Constitutional:       General: She is not in acute distress.     Appearance: She is well-developed.   HENT:      Head: Normocephalic and atraumatic.   Eyes:      Conjunctiva/sclera: Conjunctivae normal.   Cardiovascular:      Rate and Rhythm: Normal rate and regular rhythm.      Heart sounds: No murmur heard.  Pulmonary:      Effort: Pulmonary effort is normal. No respiratory distress.      Breath sounds: Normal breath sounds.   Abdominal:      Palpations: Abdomen is soft.      Tenderness: There is no abdominal tenderness.   Musculoskeletal:         General: No swelling.      Cervical back: Neck supple.   Skin:     General: Skin is warm and dry.      Capillary Refill: Capillary refill takes less than 2 seconds.   Neurological:      Mental Status: She is alert.   Psychiatric:         Mood and Affect: Mood normal.         "

## 2024-11-26 ENCOUNTER — TELEPHONE (OUTPATIENT)
Dept: FAMILY MEDICINE CLINIC | Facility: CLINIC | Age: 84
End: 2024-11-26

## 2024-12-03 NOTE — TELEPHONE ENCOUNTER
Received completed paperwork. Faxed to facility - fax confirmation received. Paperwork scanned into media

## 2024-12-03 NOTE — TELEPHONE ENCOUNTER
Skylar from [employer] called and requested a medication list faxed with the form to [phone].

## 2024-12-04 NOTE — TELEPHONE ENCOUNTER
Skylar from [employer] called to advise she received paperwork but did not received list of patient's medication. Please fax to [phone].

## (undated) DEVICE — SUT PROLENE 7-0 BV 175-6 24 IN 8735H

## (undated) DEVICE — GLOVE SRG BIOGEL 7.5

## (undated) DEVICE — GAUZE SPONGES,USP TYPE VII GAUZE, 12 PLY: Brand: CURITY

## (undated) DEVICE — SUT PROLENE 6-0 BV130 30 IN 8709H

## (undated) DEVICE — 2000CC GUARDIAN II: Brand: GUARDIAN

## (undated) DEVICE — DRAPE EQUIPMENT RF WAND

## (undated) DEVICE — 3M™ IOBAN™ 2 ANTIMICROBIAL INCISE DRAPE 6640EZ: Brand: IOBAN™ 2

## (undated) DEVICE — SUT MONOCRYL 4-0 PS-2 18 IN Y496G

## (undated) DEVICE — DECANTER: Brand: UNBRANDED

## (undated) DEVICE — SPECIMEN CONTAINER STERILE PEEL PACK

## (undated) DEVICE — ADHESIVE SKIN HIGH VISCOSITY EXOFIN 1ML

## (undated) DEVICE — LIGACLIP MCA MULTIPLE CLIP APPLIERS, 20 MEDIUM CLIPS: Brand: LIGACLIP

## (undated) DEVICE — DRAPE ISO IRRIGATION POUCH 1016

## (undated) DEVICE — SURGIFOAM 8.5 X 12.5

## (undated) DEVICE — 3M™ IOBAN™ 2 ANTIMICROBIAL INCISE DRAPE 6650EZ: Brand: IOBAN™ 2

## (undated) DEVICE — SUT PROLENE 7-0 BV175-6 24 IN M8737

## (undated) DEVICE — BAG DECANTER

## (undated) DEVICE — BETHL CAROTID ENDARTERECTOMY: Brand: CARDINAL HEALTH

## (undated) DEVICE — SYRINGE CATH TIP 50ML

## (undated) DEVICE — 3M™ TEGADERM™ TRANSPARENT FILM DRESSING FRAME STYLE, 1626W, 4 IN X 4-3/4 IN (10 CM X 12 CM), 50/CT 4CT/CASE: Brand: 3M™ TEGADERM™

## (undated) DEVICE — INSTRUMENT POUCH: Brand: CONVERTORS

## (undated) DEVICE — SUT ETHILON 4-0 PS-2 18 IN 1667H

## (undated) DEVICE — SUT MONOCRYL 3-0 SH 27 IN Y416H

## (undated) DEVICE — SUT SILK 2-0 30 IN A305H

## (undated) DEVICE — IV CATH INTROCAN 18G X 1 1/4 SAFETY

## (undated) DEVICE — PROVE COVER: Brand: UNBRANDED

## (undated) DEVICE — SUT SILK 4-0 30 IN A303H

## (undated) DEVICE — NEEDLE 25G X 1 1/2

## (undated) DEVICE — PETRI DISH STERILE

## (undated) DEVICE — DRAPE SURGIKIT SADDLE BAG

## (undated) DEVICE — THYROID SHEET: Brand: CONVERTORS

## (undated) DEVICE — SUT SILK 3-0 30 IN A304H

## (undated) DEVICE — SUT MONOCRYL 4-0 PS-2 27 IN Y426H

## (undated) DEVICE — 3M™ STERI-STRIP™ COMPOUND BENZOIN TINCTURE 40 BAGS/CARTON 4 CARTONS/CASE C1544: Brand: 3M™ STERI-STRIP™

## (undated) DEVICE — SURGICEL FIBRILLAR 1 X 2

## (undated) DEVICE — 40529 DERMAPROX PAD 11'' X 15'' X 1'': Brand: 40529 DERMAPROX PAD 11'' X 15'' X 1''

## (undated) DEVICE — SCD SEQUENTIAL COMPRESSION COMFORT SLEEVE MEDIUM KNEE LENGTH: Brand: KENDALL SCD

## (undated) DEVICE — DRAPE PROBE NEO-PROBE/ULTRASOUND

## (undated) DEVICE — CAROTID ARTERY SHUNT KIT,RADIOPAQUE LINE, STRAIGHT: Brand: ARGYLE

## (undated) DEVICE — SUT SILK 3-0 18 IN A184H